# Patient Record
Sex: FEMALE | Race: WHITE | NOT HISPANIC OR LATINO | Employment: OTHER | URBAN - METROPOLITAN AREA
[De-identification: names, ages, dates, MRNs, and addresses within clinical notes are randomized per-mention and may not be internally consistent; named-entity substitution may affect disease eponyms.]

---

## 2021-01-01 ENCOUNTER — HOSPITAL ENCOUNTER (INPATIENT)
Facility: CLINIC | Age: 81
LOS: 4 days | Discharge: NURSING, REHAB OR LONG-TERM CARE | DRG: 309 | End: 2021-09-07
Attending: EMERGENCY MEDICINE | Admitting: HOSPITALIST
Payer: MEDICARE

## 2021-01-01 ENCOUNTER — APPOINTMENT (OUTPATIENT)
Dept: CT IMAGING | Facility: CLINIC | Age: 81
DRG: 309 | End: 2021-01-01
Attending: PHYSICIAN ASSISTANT
Payer: MEDICARE

## 2021-01-01 ENCOUNTER — APPOINTMENT (OUTPATIENT)
Dept: CARDIOLOGY | Facility: CLINIC | Age: 81
DRG: 309 | End: 2021-01-01
Attending: PHYSICIAN ASSISTANT
Payer: MEDICARE

## 2021-01-01 ENCOUNTER — APPOINTMENT (OUTPATIENT)
Dept: CARDIOLOGY | Facility: CLINIC | Age: 81
DRG: 309 | End: 2021-01-01
Attending: INTERNAL MEDICINE
Payer: MEDICARE

## 2021-01-01 ENCOUNTER — APPOINTMENT (OUTPATIENT)
Dept: ULTRASOUND IMAGING | Facility: CLINIC | Age: 81
DRG: 309 | End: 2021-01-01
Attending: EMERGENCY MEDICINE
Payer: MEDICARE

## 2021-01-01 ENCOUNTER — APPOINTMENT (OUTPATIENT)
Dept: PHYSICAL THERAPY | Facility: CLINIC | Age: 81
DRG: 309 | End: 2021-01-01
Payer: MEDICARE

## 2021-01-01 ENCOUNTER — APPOINTMENT (OUTPATIENT)
Dept: GENERAL RADIOLOGY | Facility: CLINIC | Age: 81
DRG: 309 | End: 2021-01-01
Attending: EMERGENCY MEDICINE
Payer: MEDICARE

## 2021-01-01 ENCOUNTER — APPOINTMENT (OUTPATIENT)
Dept: PHYSICAL THERAPY | Facility: CLINIC | Age: 81
DRG: 309 | End: 2021-01-01
Attending: HOSPITALIST
Payer: MEDICARE

## 2021-01-01 VITALS
HEIGHT: 66 IN | TEMPERATURE: 97.6 F | DIASTOLIC BLOOD PRESSURE: 69 MMHG | WEIGHT: 189.6 LBS | HEART RATE: 75 BPM | BODY MASS INDEX: 30.47 KG/M2 | OXYGEN SATURATION: 91 % | SYSTOLIC BLOOD PRESSURE: 149 MMHG | RESPIRATION RATE: 16 BRPM

## 2021-01-01 DIAGNOSIS — M71.21 BAKER'S CYST OF KNEE, RIGHT: ICD-10-CM

## 2021-01-01 DIAGNOSIS — M17.11 PRIMARY OSTEOARTHRITIS OF RIGHT KNEE: ICD-10-CM

## 2021-01-01 DIAGNOSIS — E11.9 TYPE 2 DIABETES MELLITUS WITHOUT COMPLICATION, WITH LONG-TERM CURRENT USE OF INSULIN (H): ICD-10-CM

## 2021-01-01 DIAGNOSIS — M25.561 ACUTE PAIN OF RIGHT KNEE: ICD-10-CM

## 2021-01-01 DIAGNOSIS — B37.2 YEAST INFECTION OF THE SKIN: ICD-10-CM

## 2021-01-01 DIAGNOSIS — Z79.4 TYPE 2 DIABETES MELLITUS WITHOUT COMPLICATION, WITH LONG-TERM CURRENT USE OF INSULIN (H): ICD-10-CM

## 2021-01-01 DIAGNOSIS — I48.0 PAROXYSMAL ATRIAL FIBRILLATION (H): Primary | ICD-10-CM

## 2021-01-01 LAB
ALBUMIN SERPL-MCNC: 2.9 G/DL (ref 3.4–5)
ALBUMIN UR-MCNC: NEGATIVE MG/DL
ALP SERPL-CCNC: 100 U/L (ref 40–150)
ALT SERPL W P-5'-P-CCNC: 24 U/L (ref 0–50)
ANION GAP SERPL CALCULATED.3IONS-SCNC: 1 MMOL/L (ref 3–14)
ANION GAP SERPL CALCULATED.3IONS-SCNC: 1 MMOL/L (ref 3–14)
ANION GAP SERPL CALCULATED.3IONS-SCNC: 4 MMOL/L (ref 3–14)
ANION GAP SERPL CALCULATED.3IONS-SCNC: 8 MMOL/L (ref 3–14)
APPEARANCE UR: CLEAR
AST SERPL W P-5'-P-CCNC: 16 U/L (ref 0–45)
ATRIAL RATE - MUSE: 122 BPM
ATRIAL RATE - MUSE: 74 BPM
BACTERIA #/AREA URNS HPF: ABNORMAL /HPF
BACTERIA UR CULT: ABNORMAL
BASOPHILS # BLD AUTO: 0.1 10E3/UL (ref 0–0.2)
BASOPHILS NFR BLD AUTO: 1 %
BILIRUB DIRECT SERPL-MCNC: <0.1 MG/DL (ref 0–0.2)
BILIRUB SERPL-MCNC: 0.2 MG/DL (ref 0.2–1.3)
BILIRUB UR QL STRIP: NEGATIVE
BUN SERPL-MCNC: 26 MG/DL (ref 7–30)
BUN SERPL-MCNC: 32 MG/DL (ref 7–30)
BUN SERPL-MCNC: 40 MG/DL (ref 7–30)
BUN SERPL-MCNC: 42 MG/DL (ref 7–30)
CALCIUM SERPL-MCNC: 9.5 MG/DL (ref 8.5–10.1)
CALCIUM SERPL-MCNC: 9.6 MG/DL (ref 8.5–10.1)
CALCIUM SERPL-MCNC: 9.8 MG/DL (ref 8.5–10.1)
CALCIUM SERPL-MCNC: 9.9 MG/DL (ref 8.5–10.1)
CHLORIDE BLD-SCNC: 106 MMOL/L (ref 94–109)
CHLORIDE BLD-SCNC: 107 MMOL/L (ref 94–109)
CHLORIDE BLD-SCNC: 107 MMOL/L (ref 94–109)
CHLORIDE BLD-SCNC: 109 MMOL/L (ref 94–109)
CO2 SERPL-SCNC: 25 MMOL/L (ref 20–32)
CO2 SERPL-SCNC: 28 MMOL/L (ref 20–32)
CO2 SERPL-SCNC: 29 MMOL/L (ref 20–32)
CO2 SERPL-SCNC: 31 MMOL/L (ref 20–32)
COLOR UR AUTO: ABNORMAL
CREAT SERPL-MCNC: 0.57 MG/DL (ref 0.52–1.04)
CREAT SERPL-MCNC: 0.62 MG/DL (ref 0.52–1.04)
CREAT SERPL-MCNC: 1.02 MG/DL (ref 0.52–1.04)
CREAT SERPL-MCNC: 1.17 MG/DL (ref 0.52–1.04)
DIASTOLIC BLOOD PRESSURE - MUSE: NORMAL MMHG
DIASTOLIC BLOOD PRESSURE - MUSE: NORMAL MMHG
EOSINOPHIL # BLD AUTO: 0.1 10E3/UL (ref 0–0.7)
EOSINOPHIL NFR BLD AUTO: 1 %
ERYTHROCYTE [DISTWIDTH] IN BLOOD BY AUTOMATED COUNT: 13 % (ref 10–15)
ERYTHROCYTE [DISTWIDTH] IN BLOOD BY AUTOMATED COUNT: 13 % (ref 10–15)
GFR SERPL CREATININE-BSD FRML MDRD: 45 ML/MIN/1.73M2
GFR SERPL CREATININE-BSD FRML MDRD: 53 ML/MIN/1.73M2
GFR SERPL CREATININE-BSD FRML MDRD: 87 ML/MIN/1.73M2
GFR SERPL CREATININE-BSD FRML MDRD: 89 ML/MIN/1.73M2
GLUCOSE BLD-MCNC: 200 MG/DL (ref 70–99)
GLUCOSE BLD-MCNC: 209 MG/DL (ref 70–99)
GLUCOSE BLD-MCNC: 272 MG/DL (ref 70–99)
GLUCOSE BLD-MCNC: 78 MG/DL (ref 70–99)
GLUCOSE BLDC GLUCOMTR-MCNC: 100 MG/DL (ref 70–99)
GLUCOSE BLDC GLUCOMTR-MCNC: 105 MG/DL (ref 70–99)
GLUCOSE BLDC GLUCOMTR-MCNC: 108 MG/DL (ref 70–99)
GLUCOSE BLDC GLUCOMTR-MCNC: 115 MG/DL (ref 70–99)
GLUCOSE BLDC GLUCOMTR-MCNC: 116 MG/DL (ref 70–99)
GLUCOSE BLDC GLUCOMTR-MCNC: 119 MG/DL (ref 70–99)
GLUCOSE BLDC GLUCOMTR-MCNC: 127 MG/DL (ref 70–99)
GLUCOSE BLDC GLUCOMTR-MCNC: 135 MG/DL (ref 70–99)
GLUCOSE BLDC GLUCOMTR-MCNC: 142 MG/DL (ref 70–99)
GLUCOSE BLDC GLUCOMTR-MCNC: 147 MG/DL (ref 70–99)
GLUCOSE BLDC GLUCOMTR-MCNC: 157 MG/DL (ref 70–99)
GLUCOSE BLDC GLUCOMTR-MCNC: 173 MG/DL (ref 70–99)
GLUCOSE BLDC GLUCOMTR-MCNC: 183 MG/DL (ref 70–99)
GLUCOSE BLDC GLUCOMTR-MCNC: 194 MG/DL (ref 70–99)
GLUCOSE BLDC GLUCOMTR-MCNC: 199 MG/DL (ref 70–99)
GLUCOSE BLDC GLUCOMTR-MCNC: 210 MG/DL (ref 70–99)
GLUCOSE BLDC GLUCOMTR-MCNC: 223 MG/DL (ref 70–99)
GLUCOSE BLDC GLUCOMTR-MCNC: 231 MG/DL (ref 70–99)
GLUCOSE BLDC GLUCOMTR-MCNC: 231 MG/DL (ref 70–99)
GLUCOSE BLDC GLUCOMTR-MCNC: 234 MG/DL (ref 70–99)
GLUCOSE BLDC GLUCOMTR-MCNC: 243 MG/DL (ref 70–99)
GLUCOSE BLDC GLUCOMTR-MCNC: 270 MG/DL (ref 70–99)
GLUCOSE BLDC GLUCOMTR-MCNC: 291 MG/DL (ref 70–99)
GLUCOSE BLDC GLUCOMTR-MCNC: 65 MG/DL (ref 70–99)
GLUCOSE BLDC GLUCOMTR-MCNC: 77 MG/DL (ref 70–99)
GLUCOSE BLDC GLUCOMTR-MCNC: 90 MG/DL (ref 70–99)
GLUCOSE BLDC GLUCOMTR-MCNC: 92 MG/DL (ref 70–99)
GLUCOSE UR STRIP-MCNC: >=1000 MG/DL
HBA1C MFR BLD: 7 % (ref 0–5.6)
HCT VFR BLD AUTO: 39.8 % (ref 35–47)
HCT VFR BLD AUTO: 41.3 % (ref 35–47)
HGB BLD-MCNC: 12.7 G/DL (ref 11.7–15.7)
HGB BLD-MCNC: 13.6 G/DL (ref 11.7–15.7)
HGB UR QL STRIP: NEGATIVE
IMM GRANULOCYTES # BLD: 0.1 10E3/UL
IMM GRANULOCYTES NFR BLD: 1 %
INR PPP: 0.97 (ref 0.85–1.15)
INR PPP: 1 (ref 0.85–1.15)
INR PPP: 1.18 (ref 0.85–1.15)
INR PPP: 1.97 (ref 0.85–1.15)
INR PPP: 2.92 (ref 0.85–1.15)
INTERPRETATION ECG - MUSE: NORMAL
INTERPRETATION ECG - MUSE: NORMAL
KETONES UR STRIP-MCNC: NEGATIVE MG/DL
LEUKOCYTE ESTERASE UR QL STRIP: ABNORMAL
LVEF ECHO: NORMAL
LVEF ECHO: NORMAL
LYMPHOCYTES # BLD AUTO: 2.4 10E3/UL (ref 0.8–5.3)
LYMPHOCYTES NFR BLD AUTO: 16 %
MAGNESIUM SERPL-MCNC: 2.1 MG/DL (ref 1.6–2.3)
MCH RBC QN AUTO: 28 PG (ref 26.5–33)
MCH RBC QN AUTO: 28.8 PG (ref 26.5–33)
MCHC RBC AUTO-ENTMCNC: 31.9 G/DL (ref 31.5–36.5)
MCHC RBC AUTO-ENTMCNC: 32.9 G/DL (ref 31.5–36.5)
MCV RBC AUTO: 88 FL (ref 78–100)
MCV RBC AUTO: 88 FL (ref 78–100)
MONOCYTES # BLD AUTO: 1.2 10E3/UL (ref 0–1.3)
MONOCYTES NFR BLD AUTO: 8 %
MUCOUS THREADS #/AREA URNS LPF: PRESENT /LPF
NEUTROPHILS # BLD AUTO: 11 10E3/UL (ref 1.6–8.3)
NEUTROPHILS NFR BLD AUTO: 73 %
NITRATE UR QL: NEGATIVE
NRBC # BLD AUTO: 0 10E3/UL
NRBC BLD AUTO-RTO: 0 /100
P AXIS - MUSE: 50 DEGREES
P AXIS - MUSE: 74 DEGREES
PH UR STRIP: 5 [PH] (ref 5–7)
PLATELET # BLD AUTO: 309 10E3/UL (ref 150–450)
PLATELET # BLD AUTO: 335 10E3/UL (ref 150–450)
POTASSIUM BLD-SCNC: 3.3 MMOL/L (ref 3.4–5.3)
POTASSIUM BLD-SCNC: 3.7 MMOL/L (ref 3.4–5.3)
POTASSIUM BLD-SCNC: 3.8 MMOL/L (ref 3.4–5.3)
POTASSIUM BLD-SCNC: 3.9 MMOL/L (ref 3.4–5.3)
POTASSIUM BLD-SCNC: 3.9 MMOL/L (ref 3.4–5.3)
POTASSIUM BLD-SCNC: 4.1 MMOL/L (ref 3.4–5.3)
POTASSIUM BLD-SCNC: 4.3 MMOL/L (ref 3.4–5.3)
PR INTERVAL - MUSE: 196 MS
PR INTERVAL - MUSE: 290 MS
PROT SERPL-MCNC: 6.6 G/DL (ref 6.8–8.8)
QRS DURATION - MUSE: 76 MS
QRS DURATION - MUSE: 80 MS
QT - MUSE: 322 MS
QT - MUSE: 384 MS
QTC - MUSE: 426 MS
QTC - MUSE: 458 MS
R AXIS - MUSE: -10 DEGREES
R AXIS - MUSE: 9 DEGREES
RBC # BLD AUTO: 4.54 10E6/UL (ref 3.8–5.2)
RBC # BLD AUTO: 4.72 10E6/UL (ref 3.8–5.2)
RBC URINE: 8 /HPF
SARS-COV-2 RNA RESP QL NAA+PROBE: NEGATIVE
SODIUM SERPL-SCNC: 138 MMOL/L (ref 133–144)
SODIUM SERPL-SCNC: 139 MMOL/L (ref 133–144)
SODIUM SERPL-SCNC: 139 MMOL/L (ref 133–144)
SODIUM SERPL-SCNC: 140 MMOL/L (ref 133–144)
SP GR UR STRIP: 1.02 (ref 1–1.03)
SQUAMOUS EPITHELIAL: 1 /HPF
SYSTOLIC BLOOD PRESSURE - MUSE: NORMAL MMHG
SYSTOLIC BLOOD PRESSURE - MUSE: NORMAL MMHG
T AXIS - MUSE: -62 DEGREES
T AXIS - MUSE: 63 DEGREES
TROPONIN I SERPL-MCNC: 0.02 UG/L (ref 0–0.04)
TROPONIN I SERPL-MCNC: 0.03 UG/L (ref 0–0.04)
TROPONIN I SERPL-MCNC: <0.015 UG/L (ref 0–0.04)
TSH SERPL DL<=0.005 MIU/L-ACNC: 1.2 MU/L (ref 0.4–4)
UROBILINOGEN UR STRIP-MCNC: NORMAL MG/DL
VENTRICULAR RATE- MUSE: 122 BPM
VENTRICULAR RATE- MUSE: 74 BPM
WBC # BLD AUTO: 10.1 10E3/UL (ref 4–11)
WBC # BLD AUTO: 14.9 10E3/UL (ref 4–11)
WBC URINE: 66 /HPF

## 2021-01-01 PROCEDURE — 85610 PROTHROMBIN TIME: CPT | Performed by: INTERNAL MEDICINE

## 2021-01-01 PROCEDURE — 250N000011 HC RX IP 250 OP 636: Performed by: HOSPITALIST

## 2021-01-01 PROCEDURE — 82248 BILIRUBIN DIRECT: CPT | Performed by: INTERNAL MEDICINE

## 2021-01-01 PROCEDURE — 97530 THERAPEUTIC ACTIVITIES: CPT | Mod: GP

## 2021-01-01 PROCEDURE — 93306 TTE W/DOPPLER COMPLETE: CPT | Mod: 26 | Performed by: INTERNAL MEDICINE

## 2021-01-01 PROCEDURE — 120N000004 HC R&B MS OVERFLOW

## 2021-01-01 PROCEDURE — C8929 TTE W OR WO FOL WCON,DOPPLER: HCPCS

## 2021-01-01 PROCEDURE — 36415 COLL VENOUS BLD VENIPUNCTURE: CPT | Performed by: INTERNAL MEDICINE

## 2021-01-01 PROCEDURE — 99222 1ST HOSP IP/OBS MODERATE 55: CPT | Performed by: INTERNAL MEDICINE

## 2021-01-01 PROCEDURE — 99285 EMERGENCY DEPT VISIT HI MDM: CPT | Mod: 25

## 2021-01-01 PROCEDURE — 93971 EXTREMITY STUDY: CPT | Mod: RT

## 2021-01-01 PROCEDURE — 250N000013 HC RX MED GY IP 250 OP 250 PS 637: Performed by: INTERNAL MEDICINE

## 2021-01-01 PROCEDURE — 96361 HYDRATE IV INFUSION ADD-ON: CPT

## 2021-01-01 PROCEDURE — 85610 PROTHROMBIN TIME: CPT | Performed by: HOSPITALIST

## 2021-01-01 PROCEDURE — C8924 2D TTE W OR W/O FOL W/CON,FU: HCPCS

## 2021-01-01 PROCEDURE — 36415 COLL VENOUS BLD VENIPUNCTURE: CPT | Performed by: HOSPITALIST

## 2021-01-01 PROCEDURE — 84443 ASSAY THYROID STIM HORMONE: CPT | Performed by: PHYSICIAN ASSISTANT

## 2021-01-01 PROCEDURE — G0378 HOSPITAL OBSERVATION PER HR: HCPCS

## 2021-01-01 PROCEDURE — 255N000002 HC RX 255 OP 636: Performed by: HOSPITALIST

## 2021-01-01 PROCEDURE — 250N000013 HC RX MED GY IP 250 OP 250 PS 637: Performed by: HOSPITALIST

## 2021-01-01 PROCEDURE — 97530 THERAPEUTIC ACTIVITIES: CPT | Mod: GP | Performed by: PHYSICAL THERAPIST

## 2021-01-01 PROCEDURE — 87635 SARS-COV-2 COVID-19 AMP PRB: CPT | Performed by: EMERGENCY MEDICINE

## 2021-01-01 PROCEDURE — 36415 COLL VENOUS BLD VENIPUNCTURE: CPT | Performed by: PHYSICIAN ASSISTANT

## 2021-01-01 PROCEDURE — 93325 DOPPLER ECHO COLOR FLOW MAPG: CPT | Mod: 26 | Performed by: INTERNAL MEDICINE

## 2021-01-01 PROCEDURE — 87086 URINE CULTURE/COLONY COUNT: CPT | Performed by: PHYSICIAN ASSISTANT

## 2021-01-01 PROCEDURE — 73502 X-RAY EXAM HIP UNI 2-3 VIEWS: CPT

## 2021-01-01 PROCEDURE — 93248 EXT ECG>7D<15D REV&INTERPJ: CPT | Performed by: INTERNAL MEDICINE

## 2021-01-01 PROCEDURE — 97110 THERAPEUTIC EXERCISES: CPT | Mod: GP | Performed by: PHYSICAL THERAPIST

## 2021-01-01 PROCEDURE — 250N000013 HC RX MED GY IP 250 OP 250 PS 637: Performed by: PHYSICIAN ASSISTANT

## 2021-01-01 PROCEDURE — 84484 ASSAY OF TROPONIN QUANT: CPT | Performed by: PHYSICIAN ASSISTANT

## 2021-01-01 PROCEDURE — 80048 BASIC METABOLIC PNL TOTAL CA: CPT | Performed by: EMERGENCY MEDICINE

## 2021-01-01 PROCEDURE — C9803 HOPD COVID-19 SPEC COLLECT: HCPCS

## 2021-01-01 PROCEDURE — 80048 BASIC METABOLIC PNL TOTAL CA: CPT | Performed by: PHYSICIAN ASSISTANT

## 2021-01-01 PROCEDURE — 99207 PR CDG-MDM COMPONENT: MEETS MODERATE - DOWN CODED: CPT | Performed by: HOSPITALIST

## 2021-01-01 PROCEDURE — 250N000013 HC RX MED GY IP 250 OP 250 PS 637: Performed by: EMERGENCY MEDICINE

## 2021-01-01 PROCEDURE — 93246 EXT ECG>7D<15D RECORDING: CPT

## 2021-01-01 PROCEDURE — 93308 TTE F-UP OR LMTD: CPT | Mod: 26 | Performed by: INTERNAL MEDICINE

## 2021-01-01 PROCEDURE — 83735 ASSAY OF MAGNESIUM: CPT | Performed by: PHYSICIAN ASSISTANT

## 2021-01-01 PROCEDURE — 84132 ASSAY OF SERUM POTASSIUM: CPT | Performed by: INTERNAL MEDICINE

## 2021-01-01 PROCEDURE — 85027 COMPLETE CBC AUTOMATED: CPT | Performed by: HOSPITALIST

## 2021-01-01 PROCEDURE — 258N000003 HC RX IP 258 OP 636: Performed by: HOSPITALIST

## 2021-01-01 PROCEDURE — 84484 ASSAY OF TROPONIN QUANT: CPT | Performed by: HOSPITALIST

## 2021-01-01 PROCEDURE — 80048 BASIC METABOLIC PNL TOTAL CA: CPT | Performed by: HOSPITALIST

## 2021-01-01 PROCEDURE — 99232 SBSQ HOSP IP/OBS MODERATE 35: CPT | Performed by: HOSPITALIST

## 2021-01-01 PROCEDURE — 96372 THER/PROPH/DIAG INJ SC/IM: CPT | Performed by: HOSPITALIST

## 2021-01-01 PROCEDURE — 36415 COLL VENOUS BLD VENIPUNCTURE: CPT | Performed by: EMERGENCY MEDICINE

## 2021-01-01 PROCEDURE — 99223 1ST HOSP IP/OBS HIGH 75: CPT | Mod: AI | Performed by: HOSPITALIST

## 2021-01-01 PROCEDURE — 73560 X-RAY EXAM OF KNEE 1 OR 2: CPT | Mod: RT

## 2021-01-01 PROCEDURE — 84132 ASSAY OF SERUM POTASSIUM: CPT | Performed by: HOSPITALIST

## 2021-01-01 PROCEDURE — 97161 PT EVAL LOW COMPLEX 20 MIN: CPT | Mod: GP | Performed by: PHYSICAL THERAPIST

## 2021-01-01 PROCEDURE — 99233 SBSQ HOSP IP/OBS HIGH 50: CPT | Performed by: PHYSICIAN ASSISTANT

## 2021-01-01 PROCEDURE — 93321 DOPPLER ECHO F-UP/LMTD STD: CPT | Mod: 26 | Performed by: INTERNAL MEDICINE

## 2021-01-01 PROCEDURE — 81001 URINALYSIS AUTO W/SCOPE: CPT | Performed by: PHYSICIAN ASSISTANT

## 2021-01-01 PROCEDURE — 999N000208 ECHOCARDIOGRAM COMPLETE

## 2021-01-01 PROCEDURE — 85025 COMPLETE CBC W/AUTO DIFF WBC: CPT | Performed by: EMERGENCY MEDICINE

## 2021-01-01 PROCEDURE — 99233 SBSQ HOSP IP/OBS HIGH 50: CPT | Performed by: HOSPITALIST

## 2021-01-01 PROCEDURE — 99239 HOSP IP/OBS DSCHRG MGMT >30: CPT | Performed by: PHYSICIAN ASSISTANT

## 2021-01-01 PROCEDURE — 250N000012 HC RX MED GY IP 250 OP 636 PS 637: Performed by: HOSPITALIST

## 2021-01-01 PROCEDURE — 83036 HEMOGLOBIN GLYCOSYLATED A1C: CPT | Performed by: HOSPITALIST

## 2021-01-01 PROCEDURE — 73700 CT LOWER EXTREMITY W/O DYE: CPT | Mod: RT,ME

## 2021-01-01 PROCEDURE — 96360 HYDRATION IV INFUSION INIT: CPT

## 2021-01-01 RX ORDER — METOPROLOL TARTRATE 50 MG
100 TABLET ORAL 2 TIMES DAILY
Status: DISCONTINUED | OUTPATIENT
Start: 2021-01-01 | End: 2021-01-01

## 2021-01-01 RX ORDER — AMIODARONE HYDROCHLORIDE 200 MG/1
200 TABLET ORAL 3 TIMES DAILY
Qty: 30 TABLET | Refills: 0 | DISCHARGE
Start: 2021-01-01 | End: 2022-01-01

## 2021-01-01 RX ORDER — WARFARIN SODIUM 1 MG/1
2.5 TABLET ORAL DAILY
Qty: 30 TABLET | Refills: 0 | DISCHARGE
Start: 2021-01-01

## 2021-01-01 RX ORDER — AMLODIPINE BESYLATE 10 MG/1
10 TABLET ORAL DAILY
COMMUNITY

## 2021-01-01 RX ORDER — LISINOPRIL AND HYDROCHLOROTHIAZIDE 12.5; 2 MG/1; MG/1
2 TABLET ORAL DAILY
COMMUNITY

## 2021-01-01 RX ORDER — NITROGLYCERIN 0.4 MG/1
0.4 TABLET SUBLINGUAL EVERY 5 MIN PRN
Status: DISCONTINUED | OUTPATIENT
Start: 2021-01-01 | End: 2021-01-01 | Stop reason: HOSPADM

## 2021-01-01 RX ORDER — ONDANSETRON 4 MG/1
4 TABLET, ORALLY DISINTEGRATING ORAL EVERY 6 HOURS PRN
Status: DISCONTINUED | OUTPATIENT
Start: 2021-01-01 | End: 2021-01-01 | Stop reason: HOSPADM

## 2021-01-01 RX ORDER — AMIODARONE HYDROCHLORIDE 200 MG/1
200 TABLET ORAL DAILY
Qty: 30 TABLET | Refills: 0 | DISCHARGE
Start: 2021-01-01

## 2021-01-01 RX ORDER — CALCIUM CARBONATE 500 MG/1
500 TABLET, CHEWABLE ORAL DAILY PRN
Status: DISCONTINUED | OUTPATIENT
Start: 2021-01-01 | End: 2021-01-01 | Stop reason: HOSPADM

## 2021-01-01 RX ORDER — METOPROLOL TARTRATE 100 MG
100 TABLET ORAL 2 TIMES DAILY
Status: ON HOLD | COMMUNITY
End: 2021-01-01

## 2021-01-01 RX ORDER — WARFARIN SODIUM 2.5 MG/1
2.5 TABLET ORAL
Status: COMPLETED | OUTPATIENT
Start: 2021-01-01 | End: 2021-01-01

## 2021-01-01 RX ORDER — NALOXONE HYDROCHLORIDE 0.4 MG/ML
0.4 INJECTION, SOLUTION INTRAMUSCULAR; INTRAVENOUS; SUBCUTANEOUS
Status: DISCONTINUED | OUTPATIENT
Start: 2021-01-01 | End: 2021-01-01 | Stop reason: HOSPADM

## 2021-01-01 RX ORDER — AMLODIPINE BESYLATE 10 MG/1
10 TABLET ORAL DAILY
Status: DISCONTINUED | OUTPATIENT
Start: 2021-01-01 | End: 2021-01-01 | Stop reason: HOSPADM

## 2021-01-01 RX ORDER — GLIPIZIDE 10 MG/1
10 TABLET ORAL
Status: DISCONTINUED | OUTPATIENT
Start: 2021-01-01 | End: 2021-01-01 | Stop reason: HOSPADM

## 2021-01-01 RX ORDER — POTASSIUM CHLORIDE 1500 MG/1
40 TABLET, EXTENDED RELEASE ORAL ONCE
Status: COMPLETED | OUTPATIENT
Start: 2021-01-01 | End: 2021-01-01

## 2021-01-01 RX ORDER — WARFARIN SODIUM 5 MG/1
5 TABLET ORAL
Status: COMPLETED | OUTPATIENT
Start: 2021-01-01 | End: 2021-01-01

## 2021-01-01 RX ORDER — ONDANSETRON 2 MG/ML
4 INJECTION INTRAMUSCULAR; INTRAVENOUS EVERY 6 HOURS PRN
Status: DISCONTINUED | OUTPATIENT
Start: 2021-01-01 | End: 2021-01-01 | Stop reason: HOSPADM

## 2021-01-01 RX ORDER — ACETAMINOPHEN 325 MG/1
975 TABLET ORAL 3 TIMES DAILY
Status: DISCONTINUED | OUTPATIENT
Start: 2021-01-01 | End: 2021-01-01 | Stop reason: HOSPADM

## 2021-01-01 RX ORDER — LISINOPRIL AND HYDROCHLOROTHIAZIDE 12.5; 2 MG/1; MG/1
2 TABLET ORAL DAILY
Status: DISCONTINUED | OUTPATIENT
Start: 2021-01-01 | End: 2021-01-01 | Stop reason: HOSPADM

## 2021-01-01 RX ORDER — HYDRALAZINE HYDROCHLORIDE 20 MG/ML
10 INJECTION INTRAMUSCULAR; INTRAVENOUS EVERY 4 HOURS PRN
Status: DISCONTINUED | OUTPATIENT
Start: 2021-01-01 | End: 2021-01-01 | Stop reason: HOSPADM

## 2021-01-01 RX ORDER — AMIODARONE HYDROCHLORIDE 200 MG/1
200 TABLET ORAL DAILY
Status: DISCONTINUED | OUTPATIENT
Start: 2021-01-01 | End: 2021-01-01 | Stop reason: HOSPADM

## 2021-01-01 RX ORDER — NALOXONE HYDROCHLORIDE 0.4 MG/ML
0.2 INJECTION, SOLUTION INTRAMUSCULAR; INTRAVENOUS; SUBCUTANEOUS
Status: DISCONTINUED | OUTPATIENT
Start: 2021-01-01 | End: 2021-01-01 | Stop reason: HOSPADM

## 2021-01-01 RX ORDER — METOPROLOL SUCCINATE 50 MG/1
50 TABLET, EXTENDED RELEASE ORAL DAILY
Qty: 30 TABLET | Refills: 0 | DISCHARGE
Start: 2021-01-01

## 2021-01-01 RX ORDER — ALBUTEROL SULFATE 90 UG/1
2 AEROSOL, METERED RESPIRATORY (INHALATION) 4 TIMES DAILY PRN
Status: DISCONTINUED | OUTPATIENT
Start: 2021-01-01 | End: 2021-01-01 | Stop reason: HOSPADM

## 2021-01-01 RX ORDER — AMIODARONE HYDROCHLORIDE 200 MG/1
200 TABLET ORAL 3 TIMES DAILY
Status: DISCONTINUED | OUTPATIENT
Start: 2021-01-01 | End: 2021-01-01 | Stop reason: HOSPADM

## 2021-01-01 RX ORDER — CYCLOBENZAPRINE HCL 10 MG
10 TABLET ORAL ONCE
Status: COMPLETED | OUTPATIENT
Start: 2021-01-01 | End: 2021-01-01

## 2021-01-01 RX ORDER — METOPROLOL SUCCINATE 50 MG/1
50 TABLET, EXTENDED RELEASE ORAL DAILY
Status: DISCONTINUED | OUTPATIENT
Start: 2021-01-01 | End: 2021-01-01 | Stop reason: HOSPADM

## 2021-01-01 RX ORDER — ACETAMINOPHEN 650 MG/1
650 SUPPOSITORY RECTAL EVERY 6 HOURS PRN
Status: DISCONTINUED | OUTPATIENT
Start: 2021-01-01 | End: 2021-01-01

## 2021-01-01 RX ORDER — METOPROLOL TARTRATE 1 MG/ML
5 INJECTION, SOLUTION INTRAVENOUS
Status: ACTIVE | OUTPATIENT
Start: 2021-01-01 | End: 2021-01-01

## 2021-01-01 RX ORDER — CYCLOBENZAPRINE HCL 5 MG
10 TABLET ORAL EVERY 8 HOURS PRN
Status: DISCONTINUED | OUTPATIENT
Start: 2021-01-01 | End: 2021-01-01

## 2021-01-01 RX ORDER — NICOTINE POLACRILEX 4 MG
15-30 LOZENGE BUCCAL
Status: DISCONTINUED | OUTPATIENT
Start: 2021-01-01 | End: 2021-01-01 | Stop reason: HOSPADM

## 2021-01-01 RX ORDER — ACETAMINOPHEN 325 MG/1
650 TABLET ORAL EVERY 6 HOURS PRN
Status: DISCONTINUED | OUTPATIENT
Start: 2021-01-01 | End: 2021-01-01

## 2021-01-01 RX ORDER — DEXTROSE MONOHYDRATE 25 G/50ML
25-50 INJECTION, SOLUTION INTRAVENOUS
Status: DISCONTINUED | OUTPATIENT
Start: 2021-01-01 | End: 2021-01-01 | Stop reason: HOSPADM

## 2021-01-01 RX ORDER — TRAMADOL HYDROCHLORIDE 50 MG/1
25 TABLET ORAL EVERY 6 HOURS PRN
Qty: 15 TABLET | Refills: 0 | Status: SHIPPED | OUTPATIENT
Start: 2021-01-01

## 2021-01-01 RX ORDER — GLIPIZIDE 10 MG/1
10 TABLET ORAL
COMMUNITY

## 2021-01-01 RX ORDER — CYCLOBENZAPRINE HCL 5 MG
10 TABLET ORAL 3 TIMES DAILY
Status: COMPLETED | OUTPATIENT
Start: 2021-01-01 | End: 2021-01-01

## 2021-01-01 RX ORDER — WARFARIN SODIUM 5 MG/1
5 TABLET ORAL ONCE
Status: COMPLETED | OUTPATIENT
Start: 2021-01-01 | End: 2021-01-01

## 2021-01-01 RX ORDER — ACETAMINOPHEN 325 MG/1
975 TABLET ORAL 3 TIMES DAILY
Qty: 45 TABLET | Refills: 0 | DISCHARGE
Start: 2021-01-01 | End: 2021-01-01

## 2021-01-01 RX ORDER — ATORVASTATIN CALCIUM 40 MG/1
40 TABLET, FILM COATED ORAL AT BEDTIME
Status: DISCONTINUED | OUTPATIENT
Start: 2021-01-01 | End: 2021-01-01 | Stop reason: HOSPADM

## 2021-01-01 RX ORDER — IBUPROFEN 600 MG/1
600 TABLET, FILM COATED ORAL ONCE
Status: COMPLETED | OUTPATIENT
Start: 2021-01-01 | End: 2021-01-01

## 2021-01-01 RX ORDER — ACETAMINOPHEN 500 MG
1000 TABLET ORAL ONCE
Status: COMPLETED | OUTPATIENT
Start: 2021-01-01 | End: 2021-01-01

## 2021-01-01 RX ORDER — ATORVASTATIN CALCIUM 40 MG/1
40 TABLET, FILM COATED ORAL AT BEDTIME
COMMUNITY

## 2021-01-01 RX ORDER — SODIUM CHLORIDE 9 MG/ML
INJECTION, SOLUTION INTRAVENOUS CONTINUOUS
Status: DISCONTINUED | OUTPATIENT
Start: 2021-01-01 | End: 2021-01-01

## 2021-01-01 RX ADMIN — CYCLOBENZAPRINE HYDROCHLORIDE 10 MG: 5 TABLET, FILM COATED ORAL at 10:21

## 2021-01-01 RX ADMIN — LISINOPRIL AND HYDROCHLOROTHIAZIDE 2 TABLET: 12.5; 2 TABLET ORAL at 10:25

## 2021-01-01 RX ADMIN — LISINOPRIL AND HYDROCHLOROTHIAZIDE 2 TABLET: 12.5; 2 TABLET ORAL at 07:27

## 2021-01-01 RX ADMIN — ATORVASTATIN CALCIUM 40 MG: 40 TABLET, FILM COATED ORAL at 22:07

## 2021-01-01 RX ADMIN — METFORMIN HYDROCHLORIDE 500 MG: 500 TABLET, FILM COATED ORAL at 13:00

## 2021-01-01 RX ADMIN — POTASSIUM CHLORIDE 40 MEQ: 1500 TABLET, EXTENDED RELEASE ORAL at 06:52

## 2021-01-01 RX ADMIN — METFORMIN HYDROCHLORIDE 500 MG: 500 TABLET, FILM COATED ORAL at 08:37

## 2021-01-01 RX ADMIN — CYCLOBENZAPRINE HYDROCHLORIDE 10 MG: 5 TABLET, FILM COATED ORAL at 20:29

## 2021-01-01 RX ADMIN — LISINOPRIL AND HYDROCHLOROTHIAZIDE 2 TABLET: 12.5; 2 TABLET ORAL at 08:37

## 2021-01-01 RX ADMIN — METOPROLOL TARTRATE 100 MG: 50 TABLET, FILM COATED ORAL at 10:24

## 2021-01-01 RX ADMIN — INSULIN ASPART 2 UNITS: 100 INJECTION, SOLUTION INTRAVENOUS; SUBCUTANEOUS at 01:12

## 2021-01-01 RX ADMIN — AMIODARONE HYDROCHLORIDE 200 MG: 200 TABLET ORAL at 20:29

## 2021-01-01 RX ADMIN — INSULIN GLARGINE 30 UNITS: 100 INJECTION, SOLUTION SUBCUTANEOUS at 01:12

## 2021-01-01 RX ADMIN — METOPROLOL SUCCINATE 50 MG: 50 TABLET, EXTENDED RELEASE ORAL at 08:28

## 2021-01-01 RX ADMIN — AMLODIPINE BESYLATE 10 MG: 10 TABLET ORAL at 07:27

## 2021-01-01 RX ADMIN — DICLOFENAC SODIUM 2 G: 10 GEL TOPICAL at 08:43

## 2021-01-01 RX ADMIN — CALCIUM CARBONATE (ANTACID) CHEW TAB 500 MG 500 MG: 500 CHEW TAB at 13:04

## 2021-01-01 RX ADMIN — METFORMIN HYDROCHLORIDE 500 MG: 500 TABLET, FILM COATED ORAL at 08:35

## 2021-01-01 RX ADMIN — DICLOFENAC SODIUM 2 G: 10 GEL TOPICAL at 17:16

## 2021-01-01 RX ADMIN — GLIPIZIDE 10 MG: 10 TABLET ORAL at 09:06

## 2021-01-01 RX ADMIN — AMLODIPINE BESYLATE 10 MG: 10 TABLET ORAL at 08:58

## 2021-01-01 RX ADMIN — TRAMADOL HYDROCHLORIDE 25 MG: 50 TABLET ORAL at 06:13

## 2021-01-01 RX ADMIN — AMIODARONE HYDROCHLORIDE 200 MG: 200 TABLET ORAL at 07:27

## 2021-01-01 RX ADMIN — WARFARIN SODIUM 7.5 MG: 5 TABLET ORAL at 17:02

## 2021-01-01 RX ADMIN — ACETAMINOPHEN 975 MG: 325 TABLET, FILM COATED ORAL at 14:30

## 2021-01-01 RX ADMIN — MICONAZOLE NITRATE: 20 POWDER TOPICAL at 20:11

## 2021-01-01 RX ADMIN — HUMAN ALBUMIN MICROSPHERES AND PERFLUTREN 2 ML: 10; .22 INJECTION, SOLUTION INTRAVENOUS at 14:47

## 2021-01-01 RX ADMIN — ACETAMINOPHEN 975 MG: 325 TABLET, FILM COATED ORAL at 20:22

## 2021-01-01 RX ADMIN — WARFARIN SODIUM 2.5 MG: 2.5 TABLET ORAL at 17:13

## 2021-01-01 RX ADMIN — ACETAMINOPHEN 975 MG: 325 TABLET, FILM COATED ORAL at 15:38

## 2021-01-01 RX ADMIN — AMIODARONE HYDROCHLORIDE 200 MG: 200 TABLET ORAL at 08:37

## 2021-01-01 RX ADMIN — CYCLOBENZAPRINE HYDROCHLORIDE 10 MG: 5 TABLET, FILM COATED ORAL at 08:37

## 2021-01-01 RX ADMIN — METOPROLOL SUCCINATE 50 MG: 50 TABLET, EXTENDED RELEASE ORAL at 08:58

## 2021-01-01 RX ADMIN — INSULIN DEGLUDEC INJECTION 50 UNITS: 100 INJECTION, SOLUTION SUBCUTANEOUS at 22:10

## 2021-01-01 RX ADMIN — DICLOFENAC SODIUM 2 G: 10 GEL TOPICAL at 16:02

## 2021-01-01 RX ADMIN — METOPROLOL SUCCINATE 50 MG: 50 TABLET, EXTENDED RELEASE ORAL at 19:11

## 2021-01-01 RX ADMIN — IBUPROFEN 600 MG: 600 TABLET, FILM COATED ORAL at 18:42

## 2021-01-01 RX ADMIN — MICONAZOLE NITRATE: 20 POWDER TOPICAL at 09:01

## 2021-01-01 RX ADMIN — GLIPIZIDE 10 MG: 10 TABLET ORAL at 16:56

## 2021-01-01 RX ADMIN — ACETAMINOPHEN 975 MG: 325 TABLET, FILM COATED ORAL at 19:53

## 2021-01-01 RX ADMIN — TRAMADOL HYDROCHLORIDE 25 MG: 50 TABLET ORAL at 10:04

## 2021-01-01 RX ADMIN — GLIPIZIDE 10 MG: 10 TABLET ORAL at 17:13

## 2021-01-01 RX ADMIN — MICONAZOLE NITRATE: 20 POWDER TOPICAL at 08:43

## 2021-01-01 RX ADMIN — CYCLOBENZAPRINE HYDROCHLORIDE 10 MG: 5 TABLET, FILM COATED ORAL at 14:30

## 2021-01-01 RX ADMIN — ATORVASTATIN CALCIUM 40 MG: 40 TABLET, FILM COATED ORAL at 22:10

## 2021-01-01 RX ADMIN — DICLOFENAC SODIUM 2 G: 10 GEL TOPICAL at 09:01

## 2021-01-01 RX ADMIN — CYCLOBENZAPRINE HYDROCHLORIDE 10 MG: 10 TABLET, FILM COATED ORAL at 20:09

## 2021-01-01 RX ADMIN — DICLOFENAC SODIUM 2 G: 10 GEL TOPICAL at 22:03

## 2021-01-01 RX ADMIN — GLIPIZIDE 10 MG: 10 TABLET ORAL at 17:46

## 2021-01-01 RX ADMIN — GLIPIZIDE 10 MG: 10 TABLET ORAL at 13:00

## 2021-01-01 RX ADMIN — DICLOFENAC SODIUM 2 G: 10 GEL TOPICAL at 12:42

## 2021-01-01 RX ADMIN — MICONAZOLE NITRATE: 20 POWDER TOPICAL at 20:21

## 2021-01-01 RX ADMIN — ACETAMINOPHEN 975 MG: 325 TABLET, FILM COATED ORAL at 20:29

## 2021-01-01 RX ADMIN — AMLODIPINE BESYLATE 10 MG: 10 TABLET ORAL at 08:37

## 2021-01-01 RX ADMIN — WARFARIN SODIUM 5 MG: 5 TABLET ORAL at 17:46

## 2021-01-01 RX ADMIN — GLIPIZIDE 10 MG: 10 TABLET ORAL at 08:37

## 2021-01-01 RX ADMIN — DICLOFENAC SODIUM 2 G: 10 GEL TOPICAL at 08:35

## 2021-01-01 RX ADMIN — DICLOFENAC SODIUM 2 G: 10 GEL TOPICAL at 07:30

## 2021-01-01 RX ADMIN — ACETAMINOPHEN 975 MG: 325 TABLET, FILM COATED ORAL at 08:58

## 2021-01-01 RX ADMIN — METOPROLOL SUCCINATE 50 MG: 50 TABLET, EXTENDED RELEASE ORAL at 08:37

## 2021-01-01 RX ADMIN — ACETAMINOPHEN 975 MG: 325 TABLET, FILM COATED ORAL at 07:27

## 2021-01-01 RX ADMIN — DICLOFENAC SODIUM 2 G: 10 GEL TOPICAL at 20:11

## 2021-01-01 RX ADMIN — DICLOFENAC SODIUM 2 G: 10 GEL TOPICAL at 20:21

## 2021-01-01 RX ADMIN — METFORMIN HYDROCHLORIDE 500 MG: 500 TABLET, FILM COATED ORAL at 17:46

## 2021-01-01 RX ADMIN — AMIODARONE HYDROCHLORIDE 200 MG: 200 TABLET ORAL at 14:30

## 2021-01-01 RX ADMIN — AMLODIPINE BESYLATE 10 MG: 10 TABLET ORAL at 10:24

## 2021-01-01 RX ADMIN — GLIPIZIDE 10 MG: 10 TABLET ORAL at 08:35

## 2021-01-01 RX ADMIN — LISINOPRIL AND HYDROCHLOROTHIAZIDE 2 TABLET: 12.5; 2 TABLET ORAL at 08:58

## 2021-01-01 RX ADMIN — SODIUM CHLORIDE: 9 INJECTION, SOLUTION INTRAVENOUS at 00:47

## 2021-01-01 RX ADMIN — DICLOFENAC SODIUM 2 G: 10 GEL TOPICAL at 20:31

## 2021-01-01 RX ADMIN — AMIODARONE HYDROCHLORIDE 200 MG: 200 TABLET ORAL at 15:12

## 2021-01-01 RX ADMIN — ACETAMINOPHEN 975 MG: 325 TABLET, FILM COATED ORAL at 15:12

## 2021-01-01 RX ADMIN — AMIODARONE HYDROCHLORIDE 200 MG: 200 TABLET ORAL at 08:28

## 2021-01-01 RX ADMIN — ACETAMINOPHEN 975 MG: 325 TABLET, FILM COATED ORAL at 13:53

## 2021-01-01 RX ADMIN — NITROGLYCERIN 0.4 MG: 0.4 TABLET SUBLINGUAL at 14:48

## 2021-01-01 RX ADMIN — AMLODIPINE BESYLATE 10 MG: 10 TABLET ORAL at 08:28

## 2021-01-01 RX ADMIN — AMIODARONE HYDROCHLORIDE 200 MG: 200 TABLET ORAL at 19:52

## 2021-01-01 RX ADMIN — TRAMADOL HYDROCHLORIDE 25 MG: 50 TABLET ORAL at 12:50

## 2021-01-01 RX ADMIN — METFORMIN HYDROCHLORIDE 500 MG: 500 TABLET, FILM COATED ORAL at 09:06

## 2021-01-01 RX ADMIN — METFORMIN HYDROCHLORIDE 500 MG: 500 TABLET, FILM COATED ORAL at 17:02

## 2021-01-01 RX ADMIN — CYCLOBENZAPRINE HYDROCHLORIDE 10 MG: 5 TABLET, FILM COATED ORAL at 07:27

## 2021-01-01 RX ADMIN — ACETAMINOPHEN 975 MG: 325 TABLET, FILM COATED ORAL at 08:37

## 2021-01-01 RX ADMIN — GLIPIZIDE 10 MG: 10 TABLET ORAL at 09:37

## 2021-01-01 RX ADMIN — ACETAMINOPHEN 975 MG: 325 TABLET, FILM COATED ORAL at 20:11

## 2021-01-01 RX ADMIN — METFORMIN HYDROCHLORIDE 500 MG: 500 TABLET, FILM COATED ORAL at 17:36

## 2021-01-01 RX ADMIN — LISINOPRIL AND HYDROCHLOROTHIAZIDE 2 TABLET: 12.5; 2 TABLET ORAL at 08:28

## 2021-01-01 RX ADMIN — INSULIN DEGLUDEC INJECTION 50 UNITS: 100 INJECTION, SOLUTION SUBCUTANEOUS at 22:07

## 2021-01-01 RX ADMIN — INSULIN DEGLUDEC INJECTION 50 UNITS: 100 INJECTION, SOLUTION SUBCUTANEOUS at 22:31

## 2021-01-01 RX ADMIN — METOPROLOL SUCCINATE 50 MG: 50 TABLET, EXTENDED RELEASE ORAL at 07:27

## 2021-01-01 RX ADMIN — ACETAMINOPHEN 975 MG: 325 TABLET, FILM COATED ORAL at 08:28

## 2021-01-01 RX ADMIN — INSULIN ASPART 1 UNITS: 100 INJECTION, SOLUTION INTRAVENOUS; SUBCUTANEOUS at 22:07

## 2021-01-01 RX ADMIN — HUMAN ALBUMIN MICROSPHERES AND PERFLUTREN 2 ML: 10; .22 INJECTION, SOLUTION INTRAVENOUS at 11:57

## 2021-01-01 RX ADMIN — ATORVASTATIN CALCIUM 40 MG: 40 TABLET, FILM COATED ORAL at 22:31

## 2021-01-01 RX ADMIN — AMIODARONE HYDROCHLORIDE 200 MG: 200 TABLET ORAL at 20:22

## 2021-01-01 RX ADMIN — METFORMIN HYDROCHLORIDE 500 MG: 500 TABLET, FILM COATED ORAL at 09:37

## 2021-01-01 RX ADMIN — TRAMADOL HYDROCHLORIDE 25 MG: 50 TABLET ORAL at 15:38

## 2021-01-01 RX ADMIN — DICLOFENAC SODIUM 2 G: 10 GEL TOPICAL at 13:12

## 2021-01-01 RX ADMIN — CYCLOBENZAPRINE HYDROCHLORIDE 10 MG: 5 TABLET, FILM COATED ORAL at 19:53

## 2021-01-01 RX ADMIN — ATORVASTATIN CALCIUM 40 MG: 40 TABLET, FILM COATED ORAL at 22:03

## 2021-01-01 RX ADMIN — METFORMIN HYDROCHLORIDE 500 MG: 500 TABLET, FILM COATED ORAL at 17:13

## 2021-01-01 RX ADMIN — MICONAZOLE NITRATE: 20 POWDER TOPICAL at 20:30

## 2021-01-01 RX ADMIN — WARFARIN SODIUM 5 MG: 5 TABLET ORAL at 19:52

## 2021-01-01 RX ADMIN — AMIODARONE HYDROCHLORIDE 200 MG: 200 TABLET ORAL at 20:11

## 2021-01-01 RX ADMIN — ACETAMINOPHEN 650 MG: 325 TABLET, FILM COATED ORAL at 10:21

## 2021-01-01 RX ADMIN — DICLOFENAC SODIUM 2 G: 10 GEL TOPICAL at 12:27

## 2021-01-01 RX ADMIN — AMIODARONE HYDROCHLORIDE 200 MG: 200 TABLET ORAL at 13:53

## 2021-01-01 RX ADMIN — ONDANSETRON 4 MG: 2 INJECTION INTRAMUSCULAR; INTRAVENOUS at 00:38

## 2021-01-01 RX ADMIN — ACETAMINOPHEN 1000 MG: 500 TABLET, FILM COATED ORAL at 18:42

## 2021-01-01 RX ADMIN — INSULIN DEGLUDEC INJECTION 50 UNITS: 100 INJECTION, SOLUTION SUBCUTANEOUS at 22:28

## 2021-01-01 RX ADMIN — SODIUM CHLORIDE: 9 INJECTION, SOLUTION INTRAVENOUS at 11:03

## 2021-01-01 RX ADMIN — DICLOFENAC SODIUM 2 G: 10 GEL TOPICAL at 13:53

## 2021-01-01 RX ADMIN — AMIODARONE HYDROCHLORIDE 200 MG: 200 TABLET ORAL at 08:58

## 2021-01-01 RX ADMIN — DEXTROSE 15 G: 15 GEL ORAL at 08:59

## 2021-01-01 RX ADMIN — GLIPIZIDE 10 MG: 10 TABLET ORAL at 17:36

## 2021-01-01 ASSESSMENT — MIFFLIN-ST. JEOR: SCORE: 1356.77

## 2021-01-01 ASSESSMENT — ENCOUNTER SYMPTOMS
SHORTNESS OF BREATH: 0
ARTHRALGIAS: 1
BACK PAIN: 0

## 2021-09-02 PROBLEM — M71.21 BAKER'S CYST OF KNEE, RIGHT: Status: ACTIVE | Noted: 2021-01-01

## 2021-09-02 PROBLEM — M25.561 ACUTE PAIN OF RIGHT KNEE: Status: ACTIVE | Noted: 2021-01-01

## 2021-09-02 NOTE — ED TRIAGE NOTES
Pt arrives via EMS, EMS reports that pt comes from home where she lives on the upper level oft a home with stairs. PT reports that she has been having right hip pain since Sunday, no trauma to right hip but had a hip replacement on the right side before. PT VSS and ABC's intact

## 2021-09-02 NOTE — ED PROVIDER NOTES
History   Chief Complaint:  Hip Pain     The history is provided by the patient.      Ruthy Becerril is a 80 year old female with history of hypertension, hyperlipidemia, and Diabetes Mellitus Type 2 who presents with right hip pain. Four days ago, while she was going down the stairs she states her right hip gave out. She did not collapse. Notes associated right knee pain. Denies back pain, chest pain, and shortness of breath. She took ibuprofen which temporarily relieved her pain, but she has not taken any today. Mentions past right hip surgery.     Review of Systems   Respiratory: Negative for shortness of breath.    Cardiovascular: Negative for chest pain.   Musculoskeletal: Positive for arthralgias. Negative for back pain.   All other systems reviewed and are negative.    Allergies:  Oxycodone-Acetaminophen    Medications:  Albuterol   Amlodipine  Glipizide   Lopressor   Atorvastatin  Metformin   Lisinopril-hydrochlorothiazide     Past Medical History:    COPD   Morbid obesity   Tobacco abuse   Tinea pedis   Diabetes Mellitus Type 2   Hyperlipidemia   Hypertension   Disorder of uterus     Past Surgical History:    Vaginal Hysterectomy   Hernia repair   Cholecystectomy   Total right hip replacement     Family History:    Father: heart disease, dementia   Mother: hypertension, stroke  Sister: leukemia     Social History:  Presents alone.   PCP: Gary Liz  Arrival via EMS.     Physical Exam     Patient Vitals for the past 24 hrs:   BP Temp Temp src Pulse Resp SpO2 Weight   09/02/21 1805 107/83 -- -- -- -- -- 86.2 kg (190 lb)   09/02/21 1804 -- 98.4  F (36.9  C) Oral 88 16 96 % --     Physical Exam  General: Alert, appears elderly, otherwise well-developed and well-nourished. Cooperative.     In mild distress  HEENT:  Head:  Atraumatic  Ears:  External ears are normal  Mouth/Throat:  Oropharynx is without erythema or exudate and mucous membranes are moist.   Eyes:   Conjunctivae normal and EOM are  normal. No scleral icterus.    Pupils are equal, round, and reactive to light.   CV:  Normal rate, regular rhythm, normal heart sounds and radial pulses are 2+ and symmetric.  No murmur.  Resp:  Breath sounds are coarse bilaterally with subtle expiratory wheezing bilaterally.     Non-labored, no retractions or accessory muscle use  GI:  Abdomen is soft, no distension, no tenderness. No rebound or guarding.  No CVA tenderness bilaterally  MS:  Normal range of motion. No edema.    Back atraumatic.    No midline cervical, thoracic, or lumbar tenderness    Right Hip:    There is decreased ROM of the hip    Flexion and Extension of the hip is normal    There is no evidence of clinical dislocation    There is no hematoma or obvious bursitis    The femur appears normal and without pain    There is no hematoma to the thigh    Sensory and Motor exam to the thigh and distal leg are normal    The knee has some mild swelling to the joint and no overlying redness.  She has limited flexion and extension at the right knee due to effusion and mild discomfort.      Distal shin, ankle, and foot are without pain    Normal distal pulses are detected  Skin:  Warm and dry.  No rash or lesions noted.  Neuro: Alert. Normal strength.  Sensation intact in all 4 extremities. GCS: 15  Psych:  Normal mood and affect.    Emergency Department Course     Imaging:  XR Pelvis w Hip Right G/E 2 Views  IMPRESSION: Postoperative changes of right total hip arthroplasty. Components appear well-seated. 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 There is trabecular lucency traversing the left hip and dedicated views recommended to exclude   fracture. Pelvis negative for fracture. Vascular calcifications.  Reading per radiology    XR Knee Right 1/2 Views  IMPRESSION: Degenerative change within all 3 compartments of the right knee. No evidence for acute fracture. No significant effusion. Osteophytic spurring about the patella and lateral  joint line. Chronic enthesitis at the extensor tendon attachments.   Vascular calcifications.  Reading per radiology    US Lower Extremity Venous Duplex Right   Impression:  1.  No deep venous thrombosis in the right lower extremity.   2.  4.8 cm Baker's cyst.    Laboratory:  CBC: WBC 14.9 (H) , HGB 13.6,    BMP: urea nitrogen 40 (H), Glucose 200 (H), GFR 45 (L), Creatinine 1.17 (H) o/w WNL  Asymptomatic COVID19 Virus PCR by nasopharyngeal swab pending     Emergency Department Course:    Reviewed:  I reviewed nursing notes, vitals, past medical history and care everywhere    Assessments:  1810 I obtained history and examined the patient as noted above.   1930 I rechecked the patient and explained findings.   2006  I spoke to her daughter, Caity.     Consults:   2221 : I spoke with Dr. Frenando of the Hospitalist service from [facility] regarding patient's presentation, findings, and plan of care.    Interventions:  1842 Tylenol 1,000mg Oral   1842 Advil 600mg Oral   2009 Flexeril 10mg Oral     Disposition:  The patient was admitted to the hospital under the care of Dr. Fernando.     Impression & Plan     CMS Diagnoses: None    Medical Decision Making:  Patient is a 80-year-old female who presents with ongoing right lower extremity pain since Monday.  In discussion with the patient's daughter-in-law, she has been bedbound since Monday after coming down the stairs and having sudden onset pain in the right thigh and back of the right knee.  Patient has been unable to care for self and family is having difficulties caring for the patient as well at home due to immobility and continued pain.  She had minimal improvement with Tylenol and ibuprofen here.  We tried a muscle relaxant with cyclobenzaprine with minimal improvement as well.  Xray of right hip and knee unremarkable for acute fracture and/or dislocation.  Ultimately, I was able to pinpoint the patient's pain in the posterior aspect of the right knee and  ultrasound confirms a Baker's cyst.  It does not appear ruptured at this time and reassuringly no evidence of DVT.  I do suspect the Baker's cyst to cause the patient's discomfort at this time.  Unfortunately she remains unable to ambulate safely with a walker and so will be admitted for likely rehab placement in the next 24 hours and physical therapy assessment.  I spoke with Dr. Fernando of the hospital service who agreed observation admission.    Diagnosis:    ICD-10-CM    1. Baker's cyst of knee, right  M71.21    2. Acute pain of right knee  M25.561      Scribe Disclosure:  I, Jesús Curran, am serving as a scribe at 6:08 PM on 9/2/2021 to document services personally performed by Robert Nagy MD based on my observations and the provider's statements to me.        Robert Nagy MD  09/02/21 7629

## 2021-09-03 NOTE — PROVIDER NOTIFICATION
12:46 AM  Provider notified: x cover  Reason: pt has redness under breasts that looks and smell like yeast. Can we get order for nystatin powder?  Order:

## 2021-09-03 NOTE — CONSULTS
Discharge Pharmacy Test Claim    Patient did not elect Medicare D thus has NO PRESCRIPTION COVERAGE.      Discharge Pharmacy has one-time use 30-day free trial voucher for xarelto, eliquis, or pradaxa. Subsequent fills would be $632/mo (Xarelto), $640/mo (Eliquis), or $612/mo (Pradaxa).     Both Xarelto and Eliquis have income-based patient assistance programs that ships free drug directly to pt's home if eligible. [employer] information and applications can be found at:   Xarelto: https://www.dooub.org, ph: 5-484-527-9605  Eliquis: http://www.[facility].org, ph: 5-035-014-8401    For comparison, 14 syringes of enoxaparin without insurance is $293 and 30 tablets of jantoven/warfarin is $16.     Medicare D open enrollment runs October 15-December 7th to sign up for 2022 drug coverage. Patient can call [place]'s [employer] Linkage Line for Medicare Part D enrollment options at [phone].        Larissa Mcneill  Covering Shyla Velasco 9/3  [facility] Liaison  Ph: [phone] Pager: [phone]

## 2021-09-03 NOTE — PROGRESS NOTES
PRIMARY DIAGNOSIS: CHEST PAIN  OUTPATIENT/OBSERVATION GOALS TO BE MET BEFORE DISCHARGE:  1. Ruled out acute coronary syndrome (negative or stable Troponin):  stable, trending labs  2. Pain Status: Improved-controlled with oral pain medications.  3. Appropriate provocative testing performed: Yes  - Stress Test Procedure: Echo  - Interpretation of cardiac rhythm per telemetry tech: SR    4. Cleared by Consultants (if applicable):No  5. Return to near baseline physical activity: No  Discharge Planner Nurse   Safe discharge environment identified: No  Barriers to discharge: Yes       Entered by: Saba De La Cruz 09/03/2021 3:04 PM     Please review provider order for any additional goals.   Nurse to notify provider when observation goals have been met and patient is ready for discharge.    Pain still in right knee and spreading to left leg- mid thigh

## 2021-09-03 NOTE — ED NOTES
[facility]  ED Nurse Handoff Report    Ruthy Becerril is a 80 year old female   ED Chief complaint: Hip Pain  . ED Diagnosis:   Final diagnoses:   Baker's cyst of knee, right   Acute pain of right knee     Allergies:   Allergies   Allergen Reactions     Oxycodone-Acetaminophen Rash       Code Status: Full Code  Activity level - Baseline/Home:  Stand by Assist. Activity Level - Current:   Assist X 1. Lift room needed: No. Bariatric: No   Needed: No   Isolation: No. Infection: Not Applicable.     Vital Signs:   Vitals:    09/02/21 1804 09/02/21 1805   BP:  107/83   Pulse: 88    Resp: 16    Temp: 98.4  F (36.9  C)    TempSrc: Oral    SpO2: 96%    Weight:  86.2 kg (190 lb)       Cardiac Rhythm:  ,      Pain level:    Patient confused: No. Patient Falls Risk: Yes.   Elimination Status: Unable to void   Patient Report - Initial Complaint: pt complains of right hip and knee pain and unable to walk.  Can pivot   Tests Performed:   Abnormal Labs Reviewed   BASIC METABOLIC PANEL - Abnormal; Notable for the following components:       Result Value    Urea Nitrogen 40 (*)     Creatinine 1.17 (*)     Glucose 200 (*)     GFR Estimate 45 (*)     All other components within normal limits   CBC WITH PLATELETS AND DIFFERENTIAL - Abnormal; Notable for the following components:    WBC Count 14.9 (*)     Absolute Neutrophils 11.0 (*)     Absolute Immature Granulocytes 0.1 (*)     All other components within normal limits   . Abnormal Results: see above.   Treatments provided: meds  OBS brochure/video discussed/provided to patient:  Yes  ED Medications:   Medications   acetaminophen (TYLENOL) tablet 1,000 mg (1,000 mg Oral Given 9/2/21 1842)   ibuprofen (ADVIL/MOTRIN) tablet 600 mg (600 mg Oral Given 9/2/21 1842)   cyclobenzaprine (FLEXERIL) tablet 10 mg (10 mg Oral Given 9/2/21 2009)     Drips infusing:  No  For the majority of the shift, the patient's behavior Green..    Sepsis treatment initiated: No      Patient tested for COVID 19 prior to admission: YES    ED Nurse Name/Phone Number: Clare Gonzalez RN,   11:42 PM  RECEIVING UNIT ED HANDOFF REVIEW    Above ED Nurse Handoff Report was reviewed: Yes  Reviewed by: Marah Paz RN on September 2, 2021 at 11:51 PM

## 2021-09-03 NOTE — PROVIDER NOTIFICATION
Writer reviewed tele monitor  SR was confirmed by tele tech  MD made aware- EKG ordered and SR found

## 2021-09-03 NOTE — CONSULTS
Cardiology Consultation:    Ruthy Becerril MRN#: [id number]   YOB: 1940 Age: 80 year old     Date of Admission: 9/2/2021  Consult indication: atrial fibrillation         Assessment and Plan / Recommendations:      # Paroxysmal atrial fibrillation, symptomatic, [id number].  Converted spontaneously to normal sinus rhythm.  # HTN  # HL  # DM2  # DJD  # COPD    -Discussed options for further evaluation and management.  Patient is already on metoprolol tartrate 100 mg twice daily, with a resting heart rate of approximately 60 bpm.  As such, adding additional AV shannon blocking agents will likely cause bradycardia which would be problematic in this elderly female with limited mobility, high risk for falls.  Discussed starting amiodarone, including possible side effects that may include but are not limited to toxicity of the eyes, liver, lungs, thyroid, skin.  Discussed risk/benefits at length.  Patient is agreeable to starting amiodarone, will start amiodarone 200 mg 3 times daily for 2 weeks, then 200 mg daily thereafter.  Will decrease metoprolol tartrate 100 mg twice daily to metoprolol succinate 50 mg daily.  TSH normal.  We will add on hepatic panel.  Cardiac event monitor on discharge.  Follow-up with cardiology ordered in navigator.  -Discussed anticoagulation, risk/benefits.  Patient denies any abnormal bleeding/bruising.  Due to financial constraints, patient would like to start the least expensive option, is amenable to starting warfarin, Pharmacy assistance appreciated.  -Cardiology will sign off, however please not hesitate to page or call with any questions or concerns    Thank you for allowing our team to participate in the care of Ruthy Becerril. Please do not hesitate to page me with any questions or concerns.    Isrrael Willams MD, [facility]  Cardiology  Text Page   September 3, 2021    Voice recognition software utilized. Although reviewed after completion, some word and  grammatical errors may be present.           History of Present Illness:     I had the opportunity to see patient Ruthy Becerril at [facility] for a cardiology consultation.     As you know, patient is a pleasant 80-year-old female with a past medical history significant for diabetes, hypertension, hyperlipidemia, obesity, COPD, admitted on 9/2/2021 with significant right knee pain and inability to ambulate.    Patient denies any recent episodes of chest pain, chest pressure, abnormal shortness of breath.  She does note occasional palpitations, however these are infrequent, and last for several seconds to several minutes at a time.  No associated dizziness/lightheadedness.    As part of her evaluation, an ultrasound was done which demonstrated a 4.8 cm Baker's cyst.  Orthopedic surgery was consulted and conservative management was recommended for severe DJD.    Earlier this morning, patient reported acute onset palpitations and chest discomfort.  An ECG was done which demonstrated atrial fibrillation with RVR.  She converted back to normal sinus rhythm shortly thereafter.  A TTE was done while she was in atrial fibrillation with RVR, and this demonstrated hyperdynamic LV function with an intracavitary gradient, as well as apical hypokinesis.  Repeat TTE while in sinus rhythm demonstrated resolution of these findings, there was normal biventricular function, no significant intracavitary gradient, no abnormal wall motion. Serial troponins normal.    Patient has no prior history of paroxysmal atrial fibrillation.  No prior history of cardiac disease.  She does not drink alcohol.  No family history of arrhythmia.         Past Medical History:   I have reviewed this patient's past medical history  DM  HTN  HL  COPD         Past Surgical History:   I have reviewed this patient's past surgical history   No prior cardiac surgical history         Social History:   I have reviewed this patient's social history  Social History      Tobacco Use     Smoking status: Not on file   Substance Use Topics     Alcohol use: Not on file             Family History:   I have reviewed this patient's family history  No family history on file.          Allergies:   I have reviewed this patient's allergy history  Allergies   Allergen Reactions     Oxycodone-Acetaminophen Rash             Medications reviewed:   Prior to admission medications:  Prior to Admission medications    Medication Sig Start Date End Date Taking? Authorizing Provider   amLODIPine (NORVASC) 10 MG tablet Take 10 mg by mouth daily   Yes Unknown, Entered By History   atorvastatin (LIPITOR) 40 MG tablet Take 40 mg by mouth At Bedtime    Yes Unknown, Entered By History   glipiZIDE (GLUCOTROL) 10 MG tablet Take 10 mg by mouth 2 times daily (before meals)   Yes Unknown, Entered By History   insulin degludec (TRESIBA) 100 UNIT/ML pen Inject 50 Units Subcutaneous At Bedtime   Yes Unknown, Entered By History   lisinopril-hydrochlorothiazide (ZESTORETIC) 20-12.5 MG tablet Take 2 tablets by mouth daily   Yes Unknown, Entered By History   metFORMIN (GLUCOPHAGE) 500 MG tablet Take 500 mg by mouth 2 times daily (with meals)   Yes Unknown, Entered By History   metoprolol tartrate (LOPRESSOR) 100 MG tablet Take 100 mg by mouth 2 times daily   Yes Unknown, Entered By History      Current medications:  Current Facility-Administered Medications Ordered in Epic   Medication Dose Route Frequency Last Rate Last Admin     acetaminophen (TYLENOL) tablet 975 mg  975 mg Oral TID   975 mg at 09/03/21 1538     albuterol (PROAIR HFA/PROVENTIL HFA/VENTOLIN HFA) 108 (90 Base) MCG/ACT inhaler 2 puff  2 puff Inhalation 4x Daily PRN         amiodarone (PACERONE) tablet 200 mg  200 mg Oral TID         [START ON 9/18/2021] amiodarone (PACERONE) tablet 200 mg  200 mg Oral Daily         amLODIPine (NORVASC) tablet 10 mg  10 mg Oral Daily   10 mg at 09/03/21 1024     atorvastatin (LIPITOR) tablet 40 mg  40 mg Oral At  Bedtime         calcium carbonate (TUMS) chewable tablet 500 mg  500 mg Oral Daily PRN   500 mg at 09/03/21 1304     cyclobenzaprine (FLEXERIL) tablet 10 mg  10 mg Oral TID         glucose gel 15-30 g  15-30 g Oral Q15 Min PRN        Or     dextrose 50 % injection 25-50 mL  25-50 mL Intravenous Q15 Min PRN        Or     glucagon injection 1 mg  1 mg Subcutaneous Q15 Min PRN         diclofenac (VOLTAREN) 1 % topical gel 2 g  2 g Topical 4x Daily         glipiZIDE (GLUCOTROL) tablet 10 mg  10 mg Oral BID AC   10 mg at 09/03/21 1736     hydrALAZINE (APRESOLINE) injection 10 mg  10 mg Intravenous Q4H PRN         insulin aspart (NovoLOG) injection (RAPID ACTING)  1-7 Units Subcutaneous TID AC   2 Units at 09/03/21 1732     insulin aspart (NovoLOG) injection (RAPID ACTING)  1-5 Units Subcutaneous At Bedtime   2 Units at 09/03/21 0112     insulin degludec (TRESIBA) 100 UNIT/ML injection 50 Units  50 Units Subcutaneous At Bedtime         lidocaine (XYLOCAINE) 2 % 15 mL, alum & mag hydroxide-simethicone (MAALOX) 15 mL GI Cocktail  30 mL Oral Once PRN         lisinopril-hydrochlorothiazide (ZESTORETIC) 20-12.5 MG per tablet 2 tablet  2 tablet Oral Daily   2 tablet at 09/03/21 1025     melatonin tablet 1 mg  1 mg Oral At Bedtime PRN         metFORMIN (GLUCOPHAGE) tablet 500 mg  500 mg Oral BID w/meals   500 mg at 09/03/21 1736     metoprolol succinate ER (TOPROL-XL) 24 hr tablet 50 mg  50 mg Oral Daily         naloxone (NARCAN) injection 0.2 mg  0.2 mg Intravenous Q2 Min PRN        Or     naloxone (NARCAN) injection 0.4 mg  0.4 mg Intravenous Q2 Min PRN        Or     naloxone (NARCAN) injection 0.2 mg  0.2 mg Intramuscular Q2 Min PRN        Or     naloxone (NARCAN) injection 0.4 mg  0.4 mg Intramuscular Q2 Min PRN         No anticoagulants IF patient has had acute trauma/surgery or recent intracranial, GI or urinary tract bleeding.    Other DOES NOT GO TO MAR         ondansetron (ZOFRAN-ODT) ODT tab 4 mg  4 mg Oral Q6H PRN         Or     ondansetron (ZOFRAN) injection 4 mg  4 mg Intravenous Q6H PRN         traMADol (ULTRAM) half-tab 25-50 mg  25-50 mg Oral Q6H PRN   25 mg at 21 1538     Warfarin Therapy Reminder (Check START DATE - warfarin may be starting in the FUTURE)  1 each Does not apply Continuous PRN         No current [facility]-ordered outpatient medications on file.             Review of Systems:   A complete review of systems was performed and was negative except as mentioned in the HPI.          Physical Exam:   Vital signs were personally reviewed:  Temperatures:  Current - Temp: 98.2  F (36.8  C); Max - Temp  Av  F (36.7  C)  Min: 97.5  F (36.4  C)  Max: 98.4  F (36.9  C)  Respiration range: Resp  Av.7  Min: 16  Max: 18  Pulse range: Pulse  Av  Min: 62  Max: 125  Blood pressure range: Systolic (24hrs), Av , Min:107 , Max:144   ; Diastolic (24hrs), Av, Min:43, Max:83    Pulse oximetry range: SpO2  Av.3 %  Min: 92 %  Max: 98 %    Intake/Output Summary (Last 24 hours) at 9/3/2021 1740  Last data filed at 9/3/2021 0705  Gross per 24 hour   Intake --   Output 400 ml   Net -400 ml     189 lbs 9.6 oz  Body mass index is 30.6 kg/m .   Body surface area is 2 meters squared.    Constitutional: appears stated age, in no apparent distress, appears to be well nourished  Eyes: sclera anicteric, conjunctiva normal, no lesions on eyelids or lashes  ENT: normocephalic, without obvious abnormality, atraumatic, external ears without lesions,   Pulmonary: clear to auscultation bilaterally, no wheezes, no rales, no increased work of breathing  Cardiovascular: JVP normal, regular rate, regular rhythm, 1/6 AUGUSTINE at the RUSB, no lower extremity edema  Gastrointestinal: abdominal exam benign, non-tender, no rigidity, no guarding  Neurologic: awake, alert, face symmetrical, moves all extremities  Skin: no abnormal rashes or lesions on limited exam, nails normal without discoloration or clubbing, no jaundice  Psychiatric:  affect is normal, answers questions appropriately, oriented to self and place         Laboratory tests:   Laboratory tests personally reviewed:   CMP  Recent Labs   Lab 21  1157 21  1100 21  0737 21  0532 21  0420 21   NA  --   --   --  139  --  140   POTASSIUM  --  3.8  --  3.3*  --  3.7   CHLORIDE  --   --   --  107  --  107   CO2  --   --   --  28  --  25   ANIONGAP  --   --   --  4  --  8   *  --  194* 272* 270* 200*   BUN  --   --   --  42*  --  40*   CR  --   --   --  1.02  --  1.17*   GFRESTIMATED  --   --   --  53*  --  45*   NATACHA  --   --   --  9.5  --  9.8   MAG  --   --   --  2.1  --   --      CBC  Recent Labs   Lab 21   WBC 10.1 14.9*   RBC 4.54 4.72   HGB 12.7 13.6   HCT 39.8 41.3   MCV 88 88   MCH 28.0 28.8   MCHC 31.9 32.9   RDW 13.0 13.0    335     INRNo lab results found in last 7 days.  Lab Results   Component Value Date    TROPONIN 0.016 2021    TROPONIN 0.028 2021     No results for input(s): CHOL, HDL, LDL, TRIG, CHOLHDLRATIO in the last 51786 hours.  Lab Results   Component Value Date    A1C 7.0 2021     TSH   Date Value Ref Range Status   2021 1.20 0.40 - 4.00 mU/L Final            Imaging and Additional Data:   Additional data personally reviewed:  Recent Results (from the past 4320 hour(s))   Echocardiogram Limited   Result Value    LVEF  65-70%    Narrative    [id number]  [id number]  [id number]  [id number]^RAYNE^JONATHON^BUCKY     [facility]  Echocardiography Laboratory  [address] [place] [address]     Name: DAKOTA BANKS  MRN: [id number]  : 1940  Study Date: 2021 03:02 PM  Age: 80 yrs  Gender: Female  Patient Location: [facility]  Reason For Study: Atrial Fibrillation  Ordering Physician: JONATHON MCLAIN  Referring Physician: Gary Miller  Performed By: Adela Wright RDCS     BSA: 2.0 m2  Height: 66 in  Weight: 189 lb  HR: 69  BP: 144/72  mmHg  ______________________________________________________________________________  Procedure  Limited Portable Echo Adult. Optison (NDC #[id number]) given intravenously.  ______________________________________________________________________________  Interpretation Summary     Left ventricular systolic function is normal. The visual ejection fraction is  65-70%. No regional wall motion abnormalities noted.  Echo findings are not consistent with left ventricular outflow obstruction.  Right ventricle is not well visualized, however global systolic function is  probably normal.  Sinus rhythm was noted.     Compared to a prior TTE from earlier today, rhythm is now NSR, LV function is  less hyperdynamic, prior LVOT gradient and apical hypokinesis has resolved.  ______________________________________________________________________________  Left Ventricle  Echo findings are not consistent with left ventricular outflow obstruction.  Left ventricular systolic function is normal. The visual ejection fraction is  65-70%. No regional wall motion abnormalities noted.     Right Ventricle  The right ventricle is not well visualized. Right ventricle is not well  visualized, however global systolic function is probably normal.     Rhythm  Sinus rhythm was noted.     ______________________________________________________________________________  Report approved by: Yumiko Dykes 2021 03:37 PM     ______________________________________________________________________________      Echocardiogram Complete   Result Value    LVEF  75%    Narrative    [id number]  [facility] hospital  [id number]  [id number]^LEATHA^ARNAUD^IRENE     [facility]  Echocardiography Laboratory  [address] [place] [address]     Name: DAKOTA BANKS  MRN: [id number]  : 1940  Study Date: 2021 11:25 AM  Age: 80 yrs  Gender: Female  Patient Location: [facility]  Reason For Study: Atrial Fibrillation  Ordering Physician: ARNAUD ZHANG  RAY  Performed By: Adela Wright RDCS     BSA: 2.0 m2  Height: 66 in  Weight: 189 lb  BP: 133/57 mmHg  ______________________________________________________________________________  Procedure  Complete Portable Echo Adult. Optison (NDC #[id number]) given intravenously.  ______________________________________________________________________________  Interpretation Summary     The visual ejection fraction is estimated at 75%.  At rest the peak intracavitatory gradient is 78.4 mmHg.  Mildly decreased right ventricular systolic function  Valvular aortic stenosis can not be excluded in this study as the dynamic LVOT  gradient masks the aortic valve gradient and the aortic valve is not well seen  on this study.  The ascending aorta is Borderline dilated.  The study was technically difficult.  ______________________________________________________________________________  Left Ventricle  The left ventricle is normal in size. There is borderline eccentric left  ventricular hypertrophy. The visual ejection fraction is estimated at 75%.  Diastolic Doppler findings (E/E' ratio and/or other parameters) suggest left  ventricular filling pressures are indeterminate. A mid-cavitary gradient is  present. At rest the peak intracavitatory gradient is 78.4 mmHg. There is  severe apical wall hypokinesis.     Right Ventricle  The right ventricle is normal size. Mildly decreased right ventricular  systolic function.     Atria  Normal left atrial size. Right atrial size is normal. There is no color  Doppler evidence of an atrial shunt.     Mitral Valve  There is mild mitral annular calcification. No systolic anterior motion of the  mitral valve. There is trace mitral regurgitation.     Tricuspid Valve  There is trace tricuspid regurgitation. The right ventricular systolic  pressure is approximated at 25.6 mmHg plus the right atrial pressure.     Aortic Valve  The aortic valve is not well visualized. Thickened aortic valve leaflets.  No  aortic regurgitation is present. Valvular aortic stenosis can not be excluded  in this study as the dynamic LVOT gradient masks the aortic valve gradient and  the aortic valve is not well seen on this study.     Pulmonic Valve  The pulmonic valve is not well visualized. There is no pulmonic valvular  regurgitation.     Vessels  The aortic root is normal size. The ascending aorta is Borderline dilated. IVC  diameter <2.1 cm collapsing >50% with sniff suggests a normal RA pressure of 3  mmHg.     Pericardium  There is no pericardial effusion.     Rhythm  The rhythm was atrial flutter.  ______________________________________________________________________________  MMode/2D Measurements & Calculations  IVSd: 1.2 cm  LVIDd: 3.3 cm  LVIDs: 1.6 cm  LVPWd: 0.98 cm  FS: 50.7 %  LV mass(C)d: 105.9 grams  LV mass(C)dI: 54.2 grams/m2  Ao root diam: 3.5 cm  LA dimension: 2.8 cm  asc Aorta Diam: 3.4 cm  LA/Ao: 0.79  LVOT diam: 2.2 cm  LVOT area: 3.6 cm2  LA Volume (BP): 36.7 ml  LA Volume Index (BP): 18.8 ml/m2     RWT: 0.60     Doppler Measurements & Calculations  MV E max negrita: 114.5 cm/sec  MV max P.9 mmHg  MV mean PG: 3.9 mmHg  MV V2 VTI: 16.8 cm  MV dec time: 0.16 sec  TR max negrita: 251.6 cm/sec  TR max P.6 mmHg  E/E' av.6  Lateral E/e': 11.1  Medial E/e': 12.1     ______________________________________________________________________________  Report approved by: Yumiko Dumont 2021 12:57 PM            Clinically Significant Risk Factors Present on Admission                  Nephrology: Acute kidney failure, unspecified    Systemic: Chronic Fatigue and Other Debilities: Age-related physical debility  Other reduced mobility

## 2021-09-03 NOTE — PLAN OF CARE
PRIMARY DIAGNOSIS: ACUTE PAIN  OUTPATIENT/OBSERVATION GOALS TO BE MET BEFORE DISCHARGE:  1. Pain Status: Pain free.    2. Return to near baseline physical activity: No    3. Cleared for discharge by consultants (if involved): No    Discharge Planner Nurse   Safe discharge environment identified: Yes  Barriers to discharge: Yes       Entered by: Marah Paz 09/03/2021 6:35 AM   A & O x 4. VSS. Patient states only in pain when Moving. Has not been out of bed. Gave 2 units of rapid insulin per sliding scale. Been sleeping since. Will continue to monitor.   Please review provider order for any additional goals.   Nurse to notify provider when observation goals have been met and patient is ready for discharge.

## 2021-09-03 NOTE — PLAN OF CARE
ROOM # 202/1    Living Situation : at home with son and daughter in law  Facility name:  : son    Activity level at baseline: SBA  Activity level on admit: have not been out of bed      Patient registered to observation; given Patient Bill of Rights; given the opportunity to ask questions about observation status and their plan of care.  Patient has been oriented to the observation room, bathroom and call light is in place.    Discussed discharge goals and expectations with patient/family.

## 2021-09-03 NOTE — H&P
[facility]    History and Physical  Hospitalist       Date of Admission:  9/2/2021    Assessment & Plan   Ruthy Becerril is a 80 year old female with a past medical history of COPD, diabetes mellitus, hypertension, hyperlipidemia and obesity who presents with right-sided knee pain.    #Right knee pain secondary to baker's cyst and degenerative changes: Ms. Becerril was going down the stairs about 4 days ago when she reached the final step, put weight on her right leg and felt like it gave out. She did not fall or lose her balance. There is no loss of consciousness. No preceding chest pain or shortness of breath. No palpitations.  Since then she has had right knee pain. It comes on when she moves her knee or tries to ambulate. She lives at home with her son and grandchildren who have helped her anytime she needs to move by supporting her weight.  She is taken ibuprofen which does help with the pain but only temporarily.  She has not taken any today.  She has had prior surgery on the right hip several years ago.  -X-ray of the knee showed degenerative changes with no evidence of fracture.  X-ray of the hip and pelvis showed severe end-stage degenerative changes involving the left hip, postoperative changes of the right total hip arthroplasty with components well seated, but no evidence of fracture.  Ultrasound of the lower extremity was negative for DVT but showed 4.8 cm Baker's cyst.  -Prn tylenol and cyclobenzaprine  -Consult ortho for consideration of arthrocentesis/injection  -PT consulted.     #SALEEM: Baseline Cr around 0.8.  Creatinine on presentation in 1.17.  BUN elevated at 40. Suspect secondary to dehydration and possible ibuprofen use. Will give IVF overnight.     #Leukocytosis: Suspect stress reaction.  No fever.  No evidence of infection.  Monitor.     Chronic Medical Conditions  #DM2: Pt is chronically on 52 units Lantus qPM, glipizide 10 mg BID, metformin 500 mg BID.  We will start  lantus 30 units QPM with sliding scale insulin for now.  #HTN: Currently takes Metoprolol, Norvasc, HCTZ- Lisinopril.  We will resume in AM once doses verified.  Hydralazine prn. BP's well controlled in ED  #HL: Continue statin once dose verified  #COPD: Resume home inhalers once verified by pharm. Albuterol prn. Not in exacerbation.  #obesity: Complicates cares    DVT Prophylaxis: Pneumatic Compression Devices  Code Status: DNR/DNI.  Pt is adament she would not want to be on ventilator and would not want heroic measures.   Dispo: [place] to observation    Robert Fernando MD    Primary Care Physician   [facility]    Chief Complaint   Knee Pain    History is obtained from the patient, patient's chart discussed with ER physician    History of Present Illness   Ruthy Becerril is a 80 year old female with a past medical history of COPD, diabetes mellitus, hypertension, hyperlipidemia and obesity who presents with right-sided knee pain.    Mrs. Becerril was going down the stairs about 4 days ago when she reached the final step, put weight on her right leg and felt like it gave out. She did not fall or lose her balance. There is no loss of consciousness. No preceding chest pain or shortness of breath. No palpitations.  Since then she has had right knee pain. It comes on when she moves her knee or tries to ambulate. She lives at home with her son and grandchildren who have helped her anytime she needs to move by supporting her weight.   She is taken ibuprofen which does help with the pain but only temporarily.  She has not taken any today.  She has had prior surgery on the right hip several years ago.    In the ED, patient afebrile and hemodynamically stable.  Labs notable for BMP showing creatinine of 1.17 (nl baseline in June 2021).  CBC showed a mild leukocytosis to 14 but otherwise unremarkable.  X-ray of the knee showed degenerative changes with no evidence of fracture.  X-ray of the hip and pelvis showed  severe end-stage degenerative changes involving the left hip, postoperative changes of the right total hip arthroplasty with components well seated, but no evidence of fracture.  Ultrasound of the lower extremity was negative for DVT but showed 4.8 cm Baker's cyst.  Pt was given tylenol, cyclobenzaprine and ibuprofen.      Past Medical History    I have reviewed this patient's medical history and updated it with pertinent information if needed.   COPD   Morbid obesity   Tobacco abuse   Tinea pedis   Diabetes Mellitus Type 2   Hyperlipidemia   Hypertension     Past Surgical History   Vaginal Hysterectomy   Hernia repair   Cholecystectomy   Total right hip replacement     Prior to Admission Medications   None   Albuterol   Amlodipine  Glipizide   Lopressor   Atorvastatin  Metformin   Lisinopril-hydrochlorothiazide     Allergies   Allergies   Allergen Reactions     Oxycodone-Acetaminophen Rash       Social History   Lives in [facility] with son and grandchildren. Nondrinker. +tobacco use    Family History   Father: heart disease, dementia   Mother: hypertension, stroke  Sister: leukemia     Review of Systems   The 10 point Review of Systems is negative other than noted in the HPI or here.     Physical Exam   Temp: 98.4  F (36.9  C) Temp src: Oral BP: 107/83 Pulse: 88   Resp: 16 SpO2: 96 % O2 Device: None (Room air)    Vital Signs with Ranges  Temp:  [98.4  F (36.9  C)] 98.4  F (36.9  C)  Pulse:  [88] 88  Resp:  [16] 16  BP: (107)/(83) 107/83  SpO2:  [96 %] 96 %  190 lbs 0 oz    Constitutional: Lying in bed. No acute distress. Answers appropriately.  HEENT: Normocephalic, MMM, no elevation of JVD noted  Respiratory: Nl WOB, Clear bilaterally, No wheezes, no crackles  Cardiovascular: Regular, no murmur  GI: BS+, NT, ND, no rebound or guarding  Lymph/Hematologic: No bruising. No cervical LAD  Skin: No rash  Musculoskeletal: + Swelling of the right knee with some tenderness to palpation noted. Tender with flexion and  extension of the knee joint. No warmth noted. Minimal edema.  Neurologic: A&Ox3, Answers appropriately. CNII-XII intact. Moves all extremities but has difficulty flexing and extending at the right knee and raising right leg secondary to pain. Station all intact. Negative pronator drift.  Psychiatric: Calm    Data   Data reviewed today:  I personally reviewed   Recent Labs   Lab 09/02/21 2055   WBC 14.9*   HGB 13.6   MCV 88         POTASSIUM 3.7   CHLORIDE 107   CO2 25   BUN 40*   CR 1.17*   ANIONGAP 8   NATACHA 9.8   *       Recent Results (from the past 24 hour(s))   XR Knee Right 1/2 Views    Narrative    EXAM: XR KNEE RT 1 /2 VW  LOCATION: [facility]  DATE/TIME: 9/2/2021 6:59 PM    INDICATION: Right knee pain.  COMPARISON: None.      Impression    IMPRESSION: Degenerative change within all 3 compartments of the right knee. No evidence for acute fracture. No significant effusion. Osteophytic spurring about the patella and lateral joint line. Chronic enthesitis at the extensor tendon attachments.   Vascular calcifications.   XR Pelvis w Hip Right G/E 2 Views    Narrative    EXAM: XR PELVIS AND HIP RIGHT 2 VIEWS  LOCATION: [facility]  DATE/TIME: 9/2/2021 6:59 PM    INDICATION: Right hip pain.  COMPARISON: None.      Impression    IMPRESSION: Postoperative changes of right total hip arthroplasty. Components appear well-seated. 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 There is trabecular lucency traversing the left hip and dedicated views recommended to exclude   fracture. Pelvis negative for fracture. Vascular calcifications.   US Lower Extremity Venous Duplex Right    Narrative    EXAM: US LOWER EXTREMITY VENOUS DUPLEX RIGHT  LOCATION: [facility]  DATE/TIME: 9/2/2021 9:46 PM    INDICATION: Right posterior knee pain, concern for Baker's Cyst vs DVT  COMPARISON: None.  TECHNIQUE: Venous Duplex ultrasound of the  right lower extremity with and without compression, augmentation and duplex. Color flow and spectral Doppler with waveform analysis performed.    FINDINGS: Exam includes the common femoral, femoral, popliteal, and contralateral common femoral veins as well as segmentally visualized deep calf veins and greater saphenous vein.     RIGHT: No deep vein thrombosis. No superficial thrombophlebitis. There is a right popliteal fossa Baker's cyst measuring 4.8 x 2.3 x 1.1 cm.      Impression    IMPRESSION:  1.  No deep venous thrombosis in the right lower extremity.    2.  4.8 cm Baker's cyst.       Clinically Significant Risk Factors Present on Admission

## 2021-09-03 NOTE — CONSULTS
[facility]    Orthopedic Consultation    Ruthy Becerril MRN# [id number]   Age: 80 year old YOB: 1940     Date of Admission:  9/2/2021    Reason for consult: Right knee pain       Requesting physician: Dr. 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 Cooper if pain persists (If cortisone given 2-3 months must pass prior to TKA)           Chief Complaint:   Right knee and hip pain         History of Present Illness:   This patient is a 80 year old female who presents with the following condition requiring a hospital admission:    Four days ago, while she was going down the stairs she states her right hip gave out. She did not collapse. Notes associated right knee pain. Denies back pain, chest pain, and shortness of breath. She took ibuprofen which temporarily relieved her pain, but she has not taken any today. Mentions past right hip surgery.  It comes on when she moves her knee or tries to ambulate. She lives at home with her son and grandchildren who have helped her anytime she needs to move by supporting her weight.  She is taken ibuprofen which does help with the pain but only temporarily.  She has not taken any today.          Past Medical History:   No past medical history on file.          Past Surgical History:   No past surgical history on file.          Social History:     Social History     Tobacco Use     Smoking status: Not on file   Substance Use Topics     Alcohol use: Not on file              Family History:   No family history on file.          Immunizations:     VACCINE/DOSE   Diptheria   DPT   DTAP   HBIG   Hepatitis A   Hepatitis B   HIB   Influenza   Measles   Meningococcal   MMR   Mumps   Pneumococcal   Polio   Rubella   Small Pox   TDAP   Varicella   Zoster             Allergies:     Allergies   Allergen Reactions     Oxycodone-Acetaminophen Rash             Medications:     Current Facility-Administered Medications   Medication     acetaminophen (TYLENOL) tablet 975 mg     albuterol (PROAIR HFA/PROVENTIL HFA/VENTOLIN HFA) 108 (90 Base) MCG/ACT inhaler 2 puff     amLODIPine (NORVASC) tablet 10 mg     atorvastatin (LIPITOR) tablet 40 mg     calcium carbonate (TUMS) chewable tablet 500 mg     cyclobenzaprine (FLEXERIL) tablet 10 mg     glucose gel 15-30 g    Or     dextrose 50 % injection 25-50 mL    Or     glucagon injection 1 mg     diclofenac (VOLTAREN) 1 % topical gel 2 g     glipiZIDE (GLUCOTROL) tablet 10 mg     hydrALAZINE (APRESOLINE) injection 10 mg     insulin aspart (NovoLOG) injection (RAPID ACTING)     insulin aspart (NovoLOG) injection (RAPID ACTING)     insulin degludec (TRESIBA) 100 UNIT/ML injection 50 Units     lidocaine (XYLOCAINE) 2 % 15 mL, alum & mag hydroxide-simethicone (MAALOX) 15 mL GI Cocktail     lisinopril-hydrochlorothiazide (ZESTORETIC) 20-12.5 MG per tablet 2 tablet     melatonin tablet 1 mg     metFORMIN (GLUCOPHAGE) tablet 500 mg     metoprolol tartrate (LOPRESSOR) tablet 100 mg     naloxone (NARCAN) injection 0.2 mg    Or     naloxone (NARCAN) injection 0.4 mg    Or     naloxone (NARCAN) injection 0.2 mg    Or     naloxone (NARCAN) injection 0.4 mg     No anticoagulants IF patient has had acute trauma/surgery or recent intracranial, GI or urinary tract bleeding.      ondansetron (ZOFRAN-ODT) ODT tab 4 mg    Or     ondansetron (ZOFRAN) injection 4 mg     traMADol (ULTRAM) half-tab 25-50 mg             Review of Systems:   CV: NEGATIVE for chest pain,  "palpitations or peripheral edema  C: NEGATIVE for fever, chills, change in weight  E/M: NEGATIVE for ear, mouth and throat problems  R: NEGATIVE for significant cough or SOB          Physical Exam:   All vitals have been reviewed  Patient Vitals for the past 24 hrs:   BP Temp Temp src Pulse Resp SpO2 Height Weight   09/03/21 1534 (!) 143/61 98.2  F (36.8  C) Oral 67 18 92 % -- --   09/03/21 1110 (!) 144/72 97.5  F (36.4  C) Oral (!) 125 18 93 % -- --   09/03/21 1024 133/57 -- -- 109 -- -- -- --   09/03/21 0732 137/60 97.7  F (36.5  C) Oral 69 18 93 % -- --   09/03/21 0426 132/46 97.7  F (36.5  C) Oral 68 18 98 % -- --   09/03/21 0018 124/43 98.4  F (36.9  C) Oral 69 18 95 % 1.676 m (5' 6\") 86 kg (189 lb 9.6 oz)   09/02/21 2300 121/49 -- -- 63 -- -- -- --   09/02/21 2200 130/61 -- -- 62 -- 93 % -- --   09/02/21 1805 107/83 -- -- -- -- -- -- 86.2 kg (190 lb)   09/02/21 1804 -- 98.4  F (36.9  C) Oral 88 16 96 % -- --       Intake/Output Summary (Last 24 hours) at 9/3/2021 1602  Last data filed at 9/3/2021 0705  Gross per 24 hour   Intake --   Output 400 ml   Net -400 ml     Patient laying comfortably in bed   No ecchymosis or erythema over entirety of the right leg  No notably swelling or edema  Mild suprapatellar effusion  Full ROM of right knee - 0 to 90 degrees, limited by position in bed  Stable to varus and valgus stress, mild opening with valgus  Negative anterior drawer  No TTP over medial or lateral joint line  Hip flexes to 100 degrees  Internal rotation 30 degrees, External rotation 15 degrees  No pain with internal/external rotation while in flexion  No TTP over great trochanter  Able to SLR with pain in medial aspect of superior knee  Bilateral calves are soft, non-tender.  Bilateral lower extremity is NVI.  Sensation intact bilateral lower extremities  5/5 motor with resisted dorsi and plantar flexion bilaterally  5/5 EHL  +Dp pulse          Data:   All laboratory data reviewed  Results for orders placed " or performed during the hospital encounter of 09/02/21   XR Pelvis w Hip Right G/E 2 Views     Status: None    Narrative    EXAM: XR PELVIS AND HIP RIGHT 2 VIEWS  LOCATION: [facility]  DATE/TIME: 9/2/2021 6:59 PM    INDICATION: Right hip pain.  COMPARISON: None.      Impression    IMPRESSION: Postoperative changes of right total hip arthroplasty. Components appear well-seated. 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 There is trabecular lucency traversing the left hip and dedicated views recommended to exclude   fracture. Pelvis negative for fracture. Vascular calcifications.   XR Knee Right 1/2 Views     Status: None    Narrative    EXAM: XR KNEE RT 1 /2 VW  LOCATION: [facility]  DATE/TIME: 9/2/2021 6:59 PM    INDICATION: Right knee pain.  COMPARISON: None.      Impression    IMPRESSION: Degenerative change within all 3 compartments of the right knee. No evidence for acute fracture. No significant effusion. Osteophytic spurring about the patella and lateral joint line. Chronic enthesitis at the extensor tendon attachments.   Vascular calcifications.   US Lower Extremity Venous Duplex Right     Status: None    Narrative    EXAM: US LOWER EXTREMITY VENOUS DUPLEX RIGHT  LOCATION: [facility]  DATE/TIME: 9/2/2021 9:46 PM    INDICATION: Right posterior knee pain, concern for Baker's Cyst vs DVT  COMPARISON: None.  TECHNIQUE: Venous Duplex ultrasound of the right lower extremity with and without compression, augmentation and duplex. Color flow and spectral Doppler with waveform analysis performed.    FINDINGS: Exam includes the common femoral, femoral, popliteal, and contralateral common femoral veins as well as segmentally visualized deep calf veins and greater saphenous vein.     RIGHT: No deep vein thrombosis. No superficial thrombophlebitis. There is a right popliteal fossa Baker's cyst measuring 4.8 x 2.3 x 1.1 cm.       Impression    IMPRESSION:  1.  No deep venous thrombosis in the right lower extremity.    2.  4.8 cm Baker's cyst.   CT Knee Right w/o Contrast     Status: None    Narrative    CT KNEE RIGHT WITHOUT CONTRAST 9/3/2021 2:20 PM     HISTORY: Knee trauma, meniscal/ligament injury suspected, x-ray done  (Age >= 1y).    COMPARISON: 9/2/2021 radiographs    Radiation dose for this scan was reduced using automated exposure  control, adjustment of the mA and/or kV according to patient size, or  iterative reconstruction technique.    FINDINGS:  Degenerative arthrosis is noted. This is associated with  moderate to severe patellofemoral lateral facet joint space narrowing  and lateral compartment joint space narrowing. Lateral femoral condyle  medial spurring and hypertrophic change of the tibial spine results in  bone contacting bone. There is a large fragmented spur or articular  body measuring approximately 1.8 x 0.8 cm lateral to the patella  within the lateral joint recess. There are probable small articular  bodies within the popliteal tendon sheath. Articular body is also  noted posterior to the cruciate ligaments. Suprapatellar recess joint  effusion and mild to moderate popliteal cyst are noted. No evidence of  acute fracture. Meniscal chondrocalcinosis is noted.      Impression    IMPRESSION:  1. Advanced osteoarthropathy, greatest in the lateral and  patellofemoral compartments. Multiple articular bodies are noted.  Joint effusion/popliteal cyst is noted.  2. No evidence of acute fracture.    LYDIA WILDER MD         SYSTEM ID:  [id number]   CBC with platelets differential     Status: Abnormal    Narrative    The following orders were created for panel order CBC with platelets differential.  Procedure                               Abnormality         Status                     ---------                               -----------         ------                     CBC with platelets and d...[[id number]]  Abnormal             Final result                 Please view results for these tests on the individual orders.   Basic metabolic panel     Status: Abnormal   Result Value Ref Range    Sodium 140 133 - 144 mmol/L    Potassium 3.7 3.4 - 5.3 mmol/L    Chloride 107 94 - 109 mmol/L    Carbon Dioxide (CO2) 25 20 - 32 mmol/L    Anion Gap 8 3 - 14 mmol/L    Urea Nitrogen 40 (H) 7 - 30 mg/dL    Creatinine 1.17 (H) 0.52 - 1.04 mg/dL    Calcium 9.8 8.5 - 10.1 mg/dL    Glucose 200 (H) 70 - 99 mg/dL    GFR Estimate 45 (L) >60 mL/min/1.73m2   Asymptomatic COVID-19 Virus (Coronavirus) by PCR Nasopharyngeal     Status: Normal    Specimen: Nasopharyngeal; Swab   Result Value Ref Range    SARS CoV2 PCR Negative Negative    Narrative    Testing was performed using the chris  SARS-CoV-2 & Influenza A/B Assay on the chris  Radha  System.  This test should be ordered for the detection of SARS-COV-2 in individuals who meet SARS-CoV-2 clinical and/or epidemiological criteria. Test performance is unknown in asymptomatic patients.  This test is for in vitro diagnostic use under the FDA EUA for laboratories certified under CLIA to perform moderate and/or high complexity testing. This test has not been FDA cleared or approved.  A negative test does not rule out the presence of PCR inhibitors in the specimen or target RNA in concentration below the limit of detection for the assay. The possibility of a false negative should be considered if the patient's recent exposure or clinical presentation suggests COVID-19.  [facility] are certified under the Clinical Laboratory Improvement Amendments of 1988 (CLIA-88) as qualified to perform moderate and/or high complexity laboratory testing.   CBC with platelets and differential     Status: Abnormal   Result Value Ref Range    WBC Count 14.9 (H) 4.0 - 11.0 10e3/uL    RBC Count 4.72 3.80 - 5.20 10e6/uL    Hemoglobin 13.6 11.7 - 15.7 g/dL    Hematocrit 41.3 35.0 - 47.0 %    MCV 88 78 - 100 fL    MCH 28.8  26.5 - 33.0 pg    MCHC 32.9 31.5 - 36.5 g/dL    RDW 13.0 10.0 - 15.0 %    Platelet Count 335 150 - 450 10e3/uL    % Neutrophils 73 %    % Lymphocytes 16 %    % Monocytes 8 %    % Eosinophils 1 %    % Basophils 1 %    % Immature Granulocytes 1 %    NRBCs per 100 WBC 0 <1 /100    Absolute Neutrophils 11.0 (H) 1.6 - 8.3 10e3/uL    Absolute Lymphocytes 2.4 0.8 - 5.3 10e3/uL    Absolute Monocytes 1.2 0.0 - 1.3 10e3/uL    Absolute Eosinophils 0.1 0.0 - 0.7 10e3/uL    Absolute Basophils 0.1 0.0 - 0.2 10e3/uL    Absolute Immature Granulocytes 0.1 (H) <=0.0 10e3/uL    Absolute NRBCs 0.0 10e3/uL   Basic metabolic panel     Status: Abnormal   Result Value Ref Range    Sodium 139 133 - 144 mmol/L    Potassium 3.3 (L) 3.4 - 5.3 mmol/L    Chloride 107 94 - 109 mmol/L    Carbon Dioxide (CO2) 28 20 - 32 mmol/L    Anion Gap 4 3 - 14 mmol/L    Urea Nitrogen 42 (H) 7 - 30 mg/dL    Creatinine 1.02 0.52 - 1.04 mg/dL    Calcium 9.5 8.5 - 10.1 mg/dL    Glucose 272 (H) 70 - 99 mg/dL    GFR Estimate 53 (L) >60 mL/min/1.73m2   CBC with platelets     Status: Normal   Result Value Ref Range    WBC Count 10.1 4.0 - 11.0 10e3/uL    RBC Count 4.54 3.80 - 5.20 10e6/uL    Hemoglobin 12.7 11.7 - 15.7 g/dL    Hematocrit 39.8 35.0 - 47.0 %    MCV 88 78 - 100 fL    MCH 28.0 26.5 - 33.0 pg    MCHC 31.9 31.5 - 36.5 g/dL    RDW 13.0 10.0 - 15.0 %    Platelet Count 309 150 - 450 10e3/uL   Hemoglobin A1c     Status: Abnormal   Result Value Ref Range    Hemoglobin A1C 7.0 (H) 0.0 - 5.6 %   Glucose by meter     Status: Abnormal   Result Value Ref Range    GLUCOSE BY METER POCT 291 (H) 70 - 99 mg/dL   Glucose by meter     Status: Abnormal   Result Value Ref Range    GLUCOSE BY METER POCT 270 (H) 70 - 99 mg/dL   Potassium     Status: Normal   Result Value Ref Range    Potassium 3.8 3.4 - 5.3 mmol/L   Glucose by meter     Status: Abnormal   Result Value Ref Range    GLUCOSE BY METER POCT 194 (H) 70 - 99 mg/dL   TSH with free T4 reflex     Status: Normal   Result  Value Ref Range    TSH 1.20 0.40 - 4.00 mU/L   Magnesium     Status: Normal   Result Value Ref Range    Magnesium 2.1 1.6 - 2.3 mg/dL   Troponin I     Status: Normal   Result Value Ref Range    Troponin I 0.028 0.000 - 0.045 ug/L   Glucose by meter     Status: Abnormal   Result Value Ref Range    GLUCOSE BY METER POCT 243 (H) 70 - 99 mg/dL   Troponin I     Status: Normal   Result Value Ref Range    Troponin I 0.016 0.000 - 0.045 ug/L   EKG 12-lead, tracing only     Status: None (Preliminary result)   Result Value Ref Range    Systolic Blood Pressure  mmHg    Diastolic Blood Pressure  mmHg    Ventricular Rate 74 BPM    Atrial Rate 74 BPM    CA Interval 196 ms    QRS Duration 80 ms     ms    QTc 426 ms    P Axis 74 degrees    R AXIS 9 degrees    T Axis 63 degrees    Interpretation ECG       Sinus rhythm  Possible Inferior infarct , age undetermined  Cannot rule out Anterior infarct , age undetermined  Abnormal ECG  No previous ECGs available     Pharmacy Liaison for Medication Coverage     Status: None ()    Larissa Powell     9/3/2021  3:15 PM  Discharge Pharmacy Test Claim    Patient did not elect Medicare D thus has NO PRESCRIPTION   COVERAGE.      Discharge Pharmacy has one-time use 30-day free trial voucher for   xarelto, eliquis, or pradaxa. Subsequent fills would be $632/mo   (Xarelto), $640/mo (Eliquis), or $612/mo (Pradaxa).     Both Xarelto and Eliquis have income-based patient assistance   programs that ships free drug directly to pt's home if eligible.   Eligibilty information and applications can be found at:   Xarelto: https://www.jjpaf.org, ph: 0-117-507-2665  Eliquis: http://[email or web address], ph: 9-286-688-3340    For comparison, 14 syringes of enoxaparin without insurance is   $293 and 30 tablets of jantoven/warfarin is $16.     Medicare D open enrollment runs October 15-December 7th to sign   up for 2022 drug coverage. Patient can call [place]'s Senior   Linkage Line for Medicare  Part D enrollment options at   [phone].        Larissa Velasco 9/3  [facility] Pharmacy Liaison  Ph: [phone] Pager: [phone]        Echocardiogram Complete     Status: None   Result Value Ref Range    LVEF  75%     Narrative    [id number]  [id number]  [id number]  [id number]^LEATHA^ARNAUD^IRENE     [facility]  Echocardiography Laboratory  [address] [place] [address]     Name: DAKOTA BANKS  MRN: [id number]  : 1940  Study Date: 2021 11:25 AM  Age: 80 yrs  Gender: Female  Patient Location: [facility]  Reason For Study: Atrial Fibrillation  Ordering Physician: ARNAUD ZHANG  Performed By: Adela Wright RDCS     BSA: 2.0 m2  Height: 66 in  Weight: 189 lb  BP: 133/57 mmHg  ______________________________________________________________________________  Procedure  Complete Portable Echo Adult. Optison ([facility] #[id number]) given intravenously.  ______________________________________________________________________________  Interpretation Summary     The visual ejection fraction is estimated at 75%.  At rest the peak intracavitatory gradient is 78.4 mmHg.  Mildly decreased right ventricular systolic function  Valvular aortic stenosis can not be excluded in this study as the dynamic LVOT  gradient masks the aortic valve gradient and the aortic valve is not well seen  on this study.  The ascending aorta is Borderline dilated.  The study was technically difficult.  ______________________________________________________________________________  Left Ventricle  The left ventricle is normal in size. There is borderline eccentric left  ventricular hypertrophy. The visual ejection fraction is estimated at 75%.  Diastolic Doppler findings (E/E' ratio and/or other parameters) suggest left  ventricular filling pressures are indeterminate. A mid-cavitary gradient is  present. At rest the peak intracavitatory gradient is 78.4 mmHg. There is  severe apical wall hypokinesis.      Right Ventricle  The right ventricle is normal size. Mildly decreased right ventricular  systolic function.     Atria  Normal left atrial size. Right atrial size is normal. There is no color  Doppler evidence of an atrial shunt.     Mitral Valve  There is mild mitral annular calcification. No systolic anterior motion of the  mitral valve. There is trace mitral regurgitation.     Tricuspid Valve  There is trace tricuspid regurgitation. The right ventricular systolic  pressure is approximated at 25.6 mmHg plus the right atrial pressure.     Aortic Valve  The aortic valve is not well visualized. Thickened aortic valve leaflets. No  aortic regurgitation is present. Valvular aortic stenosis can not be excluded  in this study as the dynamic LVOT gradient masks the aortic valve gradient and  the aortic valve is not well seen on this study.     Pulmonic Valve  The pulmonic valve is not well visualized. There is no pulmonic valvular  regurgitation.     Vessels  The aortic root is normal size. The ascending aorta is Borderline dilated. IVC  diameter <2.1 cm collapsing >50% with sniff suggests a normal RA pressure of 3  mmHg.     Pericardium  There is no pericardial effusion.     Rhythm  The rhythm was atrial flutter.  ______________________________________________________________________________  MMode/2D Measurements & Calculations  IVSd: 1.2 cm  LVIDd: 3.3 cm  LVIDs: 1.6 cm  LVPWd: 0.98 cm  FS: 50.7 %  LV mass(C)d: 105.9 grams  LV mass(C)dI: 54.2 grams/m2  Ao root diam: 3.5 cm  LA dimension: 2.8 cm  asc Aorta Diam: 3.4 cm  LA/Ao: 0.79  LVOT diam: 2.2 cm  LVOT area: 3.6 cm2  LA Volume (BP): 36.7 ml  LA Volume Index (BP): 18.8 ml/m2     RWT: 0.60     Doppler Measurements & Calculations  MV E max negrita: 114.5 cm/sec  MV max P.9 mmHg  MV mean PG: 3.9 mmHg  MV V2 VTI: 16.8 cm  MV dec time: 0.16 sec  TR max negrita: 251.6 cm/sec  TR max P.6 mmHg  E/E' av.6  Lateral E/e': 11.1  Medial E/e': 12.1      ______________________________________________________________________________  Report approved by: Yumiko Dumont 2021 12:57 PM         Echocardiogram Limited     Status: None   Result Value Ref Range    LVEF  65-70%     Narrative    [id number]  [id number]  [id number]  [id number]^RAYNE^JONATHON^BUCKY     [facility]  Echocardiography Laboratory  [address] [place] [address]     Name: DAKOTA BANKS  MRN: [id number]  : 1940  Study Date: 2021 03:02 PM  Age: 80 yrs  Gender: Female  Patient Location: [facility]  Reason For Study: Atrial Fibrillation  Ordering Physician: JONATHON MCLAIN  Referring Physician: Gary Liz Venice  Performed By: Adela Wright RDCS     BSA: 2.0 m2  Height: 66 in  Weight: 189 lb  HR: 69  BP: 144/72 mmHg  ______________________________________________________________________________  Procedure  Limited Portable Echo Adult. Optison ([facility]C #[id number]) given intravenously.  ______________________________________________________________________________  Interpretation Summary     Left ventricular systolic function is normal. The visual ejection fraction is  65-70%. No regional wall motion abnormalities noted.  Echo findings are not consistent with left ventricular outflow obstruction.  Right ventricle is not well visualized, however global systolic function is  probably normal.  Sinus rhythm was noted.     Compared to a prior TTE from earlier today, rhythm is now NSR, LV function is  less hyperdynamic, prior LVOT gradient and apical hypokinesis has resolved.  ______________________________________________________________________________  Left Ventricle  Echo findings are not consistent with left ventricular outflow obstruction.  Left ventricular systolic function is normal. The visual ejection fraction is  65-70%. No regional wall motion abnormalities noted.     Right Ventricle  The right ventricle is not well visualized. Right ventricle is not  well  visualized, however global systolic function is probably normal.     Rhythm  Sinus rhythm was noted.     ______________________________________________________________________________  Report approved by: Yumiko Dykes 09/03/2021 03:37 PM     ______________________________________________________________________________             Attestation:  I have reviewed today's vital signs, notes, medications, labs and imaging with Dr. Cooper.  Amount of time performed on this consult: 30 minutes.    Tish Sanchez PA-C

## 2021-09-03 NOTE — PHARMACY-ADMISSION MEDICATION HISTORY
Admission medication history interview status for this patient is complete. See EPIC admission navigator for allergy information, prior to admission medications and immunization status.     Medication history interview done, indicate source(s): Patient  Medication history resources (including written lists, pill bottles, clinic record): Sure Scripts  Pharmacy: [facility], [place] ([facility] ok for discharge needs)    Changes made to PTA medication list:  Added: all  Changed: n/a  Reported as Not Taking: n/a  Removed: n/a    Actions taken by pharmacist (provider contacted, etc):None     Additional medication history information:   - Pt reports having been on Lantus insulin for a longer time, then only last week this was changed to a similar, other long-acting insulin in pen-form. Assuming this is degludec, could not find data via [employer] or [employer] to confirm what exact brand this was.    Medication reconciliation/reorder completed by provider prior to medication history?  N   (Y/N)     For patients on insulin therapy:   Do you use sliding scale insulin based on blood sugars? No  What is your pre-meal insulin coverage?  n/a  Do you typically eat three meals a day? 2 - 3  How many times do you check your blood glucose per day? X 2 per day  How many episodes of hypoglycemia do you typically have per month? Did not assess   Do you have a Continuous Glucose Monitor (CGM)?  No    Prior to Admission medications    Medication Sig Last Dose Taking? Auth Provider   amLODIPine (NORVASC) 10 MG tablet Take 10 mg by mouth daily 9/2/2021 at am Yes Unknown, Entered By History   atorvastatin (LIPITOR) 40 MG tablet Take 40 mg by mouth At Bedtime  9/1/2021 at pm Yes Unknown, Entered By History   glipiZIDE (GLUCOTROL) 10 MG tablet Take 10 mg by mouth 2 times daily (before meals) 9/2/2021 at am Yes Unknown, Entered By History   insulin degludec (TRESIBA) 100 UNIT/ML pen Inject 50 Units Subcutaneous At Bedtime 9/1/2021 at pm Yes  Unknown, Entered By History   lisinopril-hydrochlorothiazide (ZESTORETIC) 20-12.5 MG tablet Take 2 tablets by mouth daily 9/2/2021 at am Yes Unknown, Entered By History   metFORMIN (GLUCOPHAGE) 500 MG tablet Take 500 mg by mouth 2 times daily (with meals) 9/2/2021 at am Yes Unknown, Entered By History   metoprolol tartrate (LOPRESSOR) 100 MG tablet Take 100 mg by mouth 2 times daily 9/2/2021 at am Yes Unknown, Entered By History

## 2021-09-03 NOTE — PROGRESS NOTES
PRIMARY DIAGNOSIS: CHEST PAIN  OUTPATIENT/OBSERVATION GOALS TO BE MET BEFORE DISCHARGE:  1. Ruled out acute coronary syndrome (negative or stable Troponin):  Yes  2. Pain Status: Improved-controlled with oral pain medications.  3. Appropriate provocative testing performed: Yes  - Stress Test Procedure: Echo  - Interpretation of cardiac rhythm per telemetry tech: SR    4. Cleared by Consultants (if applicable):Yes  5. Return to near baseline physical activity: No  Discharge Planner Nurse   Safe discharge environment identified: No  Barriers to discharge: Yes       Entered by: Saba De La Cruz 09/03/2021 6:43 PM     Please review provider order for any additional goals.   Nurse to notify provider when observation goals have been met and patient is ready for discharge.    Assist x2 with monique martinez  Potassium replaced, recheck ordered for AM  BG checks, elevated  Needs UA- new purewick set up for collection  New metoprolol dose started  Knee wrapped with ace wrap per Ortho  KI at bedside for when OOB  Continue with plan of care

## 2021-09-03 NOTE — PROGRESS NOTES
PRIMARY DIAGNOSIS: CHEST PAIN  OUTPATIENT/OBSERVATION GOALS TO BE MET BEFORE DISCHARGE:  1. Ruled out acute coronary syndrome (negative or stable Troponin):  stable,still trending lab  2. Pain Status: Improved-controlled with oral pain medications- has not required since this AM, given tylenol, flexeril, tums  3. Appropriate provocative testing performed: Yes  - Stress Test Procedure: Echo  - Interpretation of cardiac rhythm per telemetry tech: SR    4. Cleared by Consultants (if applicable):No  5. Return to near baseline physical activity: No  Discharge Planner Nurse   Safe discharge environment identified: No  Barriers to discharge: Yes       Entered by: Saba De La Cruz 09/03/2021 3:06 PM     Please review provider order for any additional goals.   Nurse to notify provider when observation goals have been met and patient is ready for discharge.  Pt moving assist x2 with monique martinez, up in chair, PT consulted  Pain in right knee- waiting for ortho input, CT scan completed  Tolerating diet, denies nausea  Chest pain improving, on tele

## 2021-09-03 NOTE — PROGRESS NOTES
[facility]  Hospitalist Progress Note  Rosetta Noble PA-C 09/03/2021    Reason for Stay (Diagnosis): right knee pain          Assessment and Plan:      Ruthy Becerril is a 80 year old female with a past medical history of COPD, diabetes mellitus, hypertension, hyperlipidemia and obesity who presents with right-sided knee pain.   Pt had interval complaints of substernal heaviness morning of 9/3.  GI cocktail was given that improved her symptoms.  However EKG did show new onset A. fib RVR with heart rates in the 140s.    #New onset A. fib RVR-she just self converted this afternoon  --Speaking with patient, she states that she has had previous episodes of intermittent palpitations so I suspect she might have paroxysmal atrial fibrillation  --Mildly hypokalemic but this has been corrected.  Mag looks okay as well as TSH no obvious source of infection troponins negative  --Echocardiogram performed today shows evidence of hyperdynamic left ventricle with apical hypokinesis  --Plan for cardiology evaluation  --Continue monitor on telemetry for now  --Continue PTA beta-blocker with 100 mg twice daily  --May need to discuss oral anticoagulation ([id number] score of 5, 7.2% of lifetime stroke potential)   --Hold anticoagulation for now in case entire procedure might be indicated for right knee pain  --We will preemptively have pharmacy check Xarelto Eliquis for anticoagulation    Hosp addendum  Discussed with Dr. Willams from cardiology.  He will be starting her on amiodarone.  In terms of anticoagulation she has opted for warfarin.  Will start pharmacy consult for dosing.  Likely will need Zio patch to monitor for bouts of intermittent A. fib.  Unfortunately unable to get over the weekend.  She will need to have this arranged as an outpatient.     #Right knee pain secondary to baker's cyst and degenerative changes:  going down the stairs about 4 days ago when she reached the final step, put weight on her  right leg and felt like it gave out. She did not fall or lose her balance.  She lives at home with her son and grandchildren who have helped her anytime she needs to move by supporting her weight.   --X-ray of the knee showed degenerative changes with no evidence of fracture.  X-ray of the hip and pelvis showed severe end-stage degenerative changes involving the left hip, postoperative changes of the right total hip arthroplasty with components well seated, but no evidence of fracture. -- Ultrasound of the lower extremity was negative for DVT but showed 4.8 cm Baker's cyst.  --Not really sure why she is having such significant pain on her right knee, she did not have this much pain with a Baker's cyst  --Does have suprapatellar effusion, but not convincing for intra-articular infection   --she denies any history of gout  --Await Ortho consult, they did order CT scan of the right knee  --Continue supportive care with scheduled tylenol, flexeril and PRN tramadol  -PT consulted.     Hosp addendum:  Discussed with orthopedic, appreciate consult.  Will add on to pain control in addition Ace wrap and knee immobilizer for support.  Will likely need outpatient orthopedic follow-up.  PT eval tomorrow, suspect will need TCU especially in light that patient has multiple stairs to get to at home.    #SALEEM: Baseline Cr around 0.8.  Creatinine on presentation in 1.17.    --Improved after IVF.      #Leukocytosis: Suspect stress reaction.  No fever.  No evidence of infection.   --resolved today      Chronic Medical Conditions  #DM2: Pt is chronically on 50 units Lantus qPM, glipizide 10 mg BID, metformin 500 mg BID.   --Resume PTA regiment and accu-checks with sliding scale insulin for now.    #HTN: Currently takes Metoprolol, Norvasc, HCTZ- Lisinopril.    --All resumed with stable BP    #HL: resume lipitor    #COPD: Not in acute exacerbation.  Continue albuterol as needed.  Recommend continue aggressive I-S while patient is still  "here    #Obesity: Complicates cares     DVT Prophylaxis: Pneumatic Compression Devices  Code Status: DNR/DNI.  Pt is adament she would not want to be on ventilator and would not want heroic measures.   Dispo: possible home in the next day or 2 vs TCU    Hospitalist addendum  1) Was able to reach patient's daughter-in-law, she states the patient has had multiple issues in the past and she hides it from her doctors. Apparently patient has had episodes of chest pain that she did not want to tell the doctor.    2) Pt recently had a colonoscopy in [place] (due to concerns for hematochezia) (report not available)  Apparently she was told that she needed to come back for a follow-up because there was concern that she might have colon cancer.  Patient had refused and did not want to follow-up.  Daughter-in-law wanted to know if we can do anything about this however at this current time I do not have her colonoscopy report.  She not having any episodes of hematochezia.  I encouraged her to follow-up with her primary care provider and gastroenterology in regards with her colonoscopy findings especially in light of warfarin initiation  3) daughter-in-law states that patient has had increased urinary incontinence.  Will check UA to rule out infection  -Symptoms might be related to urinary urgency/ incontinence that will need outpatient follow-up.         Interval History (Subjective):      Tearful, fearful abt putting weight on the right knee.   C/o substernal chest discomfort this am. Found to be in afib rvr. No complaints of chest pain no SOB no n/v. Denies any hx of arrhthymias.                   Physical Exam:      Last Vital Signs:  /60 (BP Location: Right arm)   Pulse 69   Temp 97.7  F (36.5  C) (Oral)   Resp 18   Ht 1.676 m (5' 6\")   Wt 86 kg (189 lb 9.6 oz)   SpO2 93%   BMI 30.60 kg/m        Constitutional: Awake, alert, cooperative, mild distress   Respiratory: Clear to auscultation bilaterally, no " crackles or wheezing   Cardiovascular: irreg irreg, normal S1 and S2, and no murmur noted   Abdomen: Normal bowel sounds, soft, non-distended, non-tender   Skin: No rashes, no cyanosis, dry to touch   Neuro: Alert and oriented x3, no weakness, numbness, memory loss   Extremities: Mild prepatellar effusion on the right, pain over the lateral posterior knee, able to raise leg but with some pain. Bending knee is ok to around 90   Other(s):        All other systems: Negative          Medications:      All current medications were reviewed with changes reflected in problem list.         Data:      All new lab and imaging data was reviewed.   Labs:       Lab Results   Component Value Date     09/03/2021     09/02/2021    Lab Results   Component Value Date    CHLORIDE 107 09/03/2021    CHLORIDE 107 09/02/2021    Lab Results   Component Value Date    BUN 42 09/03/2021    BUN 40 09/02/2021      Lab Results   Component Value Date    POTASSIUM 3.8 09/03/2021    POTASSIUM 3.3 09/03/2021    POTASSIUM 3.7 09/02/2021    Lab Results   Component Value Date    CO2 28 09/03/2021    CO2 25 09/02/2021    Lab Results   Component Value Date    CR 1.02 09/03/2021    CR 1.17 09/02/2021        Recent Labs   Lab 09/03/21  0532 09/02/21  2055   WBC 10.1 14.9*   HGB 12.7 13.6   HCT 39.8 41.3   MCV 88 88    335     No results for input(s): SED, CRP in the last 168 hours.  Recent Labs   Lab 09/03/21  1157 09/03/21  0737 09/03/21  0532 09/03/21  0420 09/03/21  0042   * 194* 272* 270* 291*     Recent Labs   Lab 09/03/21  1100   TROPONIN 0.028     Recent Labs   Lab 09/03/21  0532   TSH 1.20     Imaging:   Results for orders placed or performed during the hospital encounter of 09/02/21   XR Pelvis w Hip Right G/E 2 Views    Narrative    EXAM: XR PELVIS AND HIP RIGHT 2 VIEWS  LOCATION: [facility]  DATE/TIME: 9/2/2021 6:59 PM    INDICATION: Right hip pain.  COMPARISON: None.      Impression     IMPRESSION: Postoperative changes of right total hip arthroplasty. Components appear well-seated. 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 There is trabecular lucency traversing the left hip and dedicated views recommended to exclude   fracture. Pelvis negative for fracture. Vascular calcifications.   XR Knee Right 1/2 Views    Narrative    EXAM: XR KNEE RT 1 /2 VW  LOCATION: [facility]  DATE/TIME: 9/2/2021 6:59 PM    INDICATION: Right knee pain.  COMPARISON: None.      Impression    IMPRESSION: Degenerative change within all 3 compartments of the right knee. No evidence for acute fracture. No significant effusion. Osteophytic spurring about the patella and lateral joint line. Chronic enthesitis at the extensor tendon attachments.   Vascular calcifications.   US Lower Extremity Venous Duplex Right    Narrative    EXAM: US LOWER EXTREMITY VENOUS DUPLEX RIGHT  LOCATION: [facility]  DATE/TIME: 9/2/2021 9:46 PM    INDICATION: Right posterior knee pain, concern for Baker's Cyst vs DVT  COMPARISON: None.  TECHNIQUE: Venous Duplex ultrasound of the right lower extremity with and without compression, augmentation and duplex. Color flow and spectral Doppler with waveform analysis performed.    FINDINGS: Exam includes the common femoral, femoral, popliteal, and contralateral common femoral veins as well as segmentally visualized deep calf veins and greater saphenous vein.     RIGHT: No deep vein thrombosis. No superficial thrombophlebitis. There is a right popliteal fossa Baker's cyst measuring 4.8 x 2.3 x 1.1 cm.      Impression    IMPRESSION:  1.  No deep venous thrombosis in the right lower extremity.    2.  4.8 cm Baker's cyst.   CT Knee Right w/o Contrast    Narrative    CT KNEE RIGHT WITHOUT CONTRAST 9/3/2021 2:20 PM     HISTORY: Knee trauma, meniscal/ligament injury suspected, x-ray done  (Age >= 1y).    COMPARISON: 9/2/2021 radiographs    Radiation dose  for this scan was reduced using automated exposure  control, adjustment of the mA and/or kV according to patient size, or  iterative reconstruction technique.    FINDINGS:  Degenerative arthrosis is noted. This is associated with  moderate to severe patellofemoral lateral facet joint space narrowing  and lateral compartment joint space narrowing. Lateral femoral condyle  medial spurring and hypertrophic change of the tibial spine results in  bone contacting bone. There is a large fragmented spur or articular  body measuring approximately 1.8 x 0.8 cm lateral to the patella  within the lateral joint recess. There are probable small articular  bodies within the popliteal tendon sheath. Articular body is also  noted posterior to the cruciate ligaments. Suprapatellar recess joint  effusion and mild to moderate popliteal cyst are noted. No evidence of  acute fracture. Meniscal chondrocalcinosis is noted.      Impression    IMPRESSION:  1. Advanced osteoarthropathy, greatest in the lateral and  patellofemoral compartments. Multiple articular bodies are noted.  Joint effusion/popliteal cyst is noted.  2. No evidence of acute fracture.    LYDIA WILDER MD         SYSTEM ID:  [id number]   Echocardiogram Complete     Value    LVEF  75%    Narrative    [id number]  [facility]  [id number]  [id number]^LEATHA^ARNAUD^IRENE     [facility]  Echocardiography Laboratory  [address] [place] [address]     Name: DAKOTA BANKS  MRN: [id number]  : 1940  Study Date: 2021 11:25 AM  Age: 80 yrs  Gender: Female  Patient Location: [facility]  Reason For Study: Atrial Fibrillation  Ordering Physician: ARNAUD ZHANG  Performed By: Adela Wright RDCS     BSA: 2.0 m2  Height: 66 in  Weight: 189 lb  BP: 133/57 mmHg  ______________________________________________________________________________  Procedure  Complete Portable Echo Adult. [employer]ison (NDC #[id number]) given  intravenously.  ______________________________________________________________________________  Interpretation Summary     The visual ejection fraction is estimated at 75%.  At rest the peak intracavitatory gradient is 78.4 mmHg.  Mildly decreased right ventricular systolic function  Valvular aortic stenosis can not be excluded in this study as the dynamic LVOT  gradient masks the aortic valve gradient and the aortic valve is not well seen  on this study.  The ascending aorta is Borderline dilated.  The study was technically difficult.  ______________________________________________________________________________  Left Ventricle  The left ventricle is normal in size. There is borderline eccentric left  ventricular hypertrophy. The visual ejection fraction is estimated at 75%.  Diastolic Doppler findings (E/E' ratio and/or other parameters) suggest left  ventricular filling pressures are indeterminate. A mid-cavitary gradient is  present. At rest the peak intracavitatory gradient is 78.4 mmHg. There is  severe apical wall hypokinesis.     Right Ventricle  The right ventricle is normal size. Mildly decreased right ventricular  systolic function.     Atria  Normal left atrial size. Right atrial size is normal. There is no color  Doppler evidence of an atrial shunt.     Mitral Valve  There is mild mitral annular calcification. No systolic anterior motion of the  mitral valve. There is trace mitral regurgitation.     Tricuspid Valve  There is trace tricuspid regurgitation. The right ventricular systolic  pressure is approximated at 25.6 mmHg plus the right atrial pressure.     Aortic Valve  The aortic valve is not well visualized. Thickened aortic valve leaflets. No  aortic regurgitation is present. Valvular aortic stenosis can not be excluded  in this study as the dynamic LVOT gradient masks the aortic valve gradient and  the aortic valve is not well seen on this study.     Pulmonic Valve  The pulmonic valve is not  well visualized. There is no pulmonic valvular  regurgitation.     Vessels  The aortic root is normal size. The ascending aorta is Borderline dilated. IVC  diameter <2.1 cm collapsing >50% with sniff suggests a normal RA pressure of 3  mmHg.     Pericardium  There is no pericardial effusion.     Rhythm  The rhythm was atrial flutter.  ______________________________________________________________________________  MMode/2D Measurements & Calculations  IVSd: 1.2 cm  LVIDd: 3.3 cm  LVIDs: 1.6 cm  LVPWd: 0.98 cm  FS: 50.7 %  LV mass(C)d: 105.9 grams  LV mass(C)dI: 54.2 grams/m2  Ao root diam: 3.5 cm  LA dimension: 2.8 cm  asc Aorta Diam: 3.4 cm  LA/Ao: 0.79  LVOT diam: 2.2 cm  LVOT area: 3.6 cm2  LA Volume (BP): 36.7 ml  LA Volume Index (BP): 18.8 ml/m2     RWT: 0.60     Doppler Measurements & Calculations  MV E max negrita: 114.5 cm/sec  MV max P.9 mmHg  MV mean PG: 3.9 mmHg  MV V2 VTI: 16.8 cm  MV dec time: 0.16 sec  TR max negrita: 251.6 cm/sec  TR max P.6 mmHg  E/E' av.6  Lateral E/e': 11.1  Medial E/e': 12.1     ______________________________________________________________________________  Report approved by: Yumiko Dumont 2021 12:57 PM

## 2021-09-03 NOTE — ED NOTES
Pt unable to stand for ambulation test with walker due to pain behind their R knee. Dr Nagy informed.

## 2021-09-04 NOTE — CONSULTS
Care Management Initial Consult    General Information  Assessment completed with: Patient,    Type of CM/SW Visit: Initial Assessment    Primary Care Provider verified and updated as needed:     Readmission within the last 30 days:        Reason for Consult: discharge planning  Advance Care Planning:            Communication Assessment  Patient's communication style: spoken language (English or Bilingual)    Hearing Difficulty or Deaf: no   Wear Glasses or Blind: no    Cognitive  Cognitive/Neuro/Behavioral: WDL                      Living Environment:   People in home: alone     Current living Arrangements: house      Able to return to prior arrangements: no       Family/Social Support:  Care provided by: self  Provides care for: no one     Children          Description of Support System: Supportive    Support Assessment: Adequate family and caregiver support, Adequate social supports    Current Resources:   Patient receiving home care services: No     Community Resources: None  Equipment currently used at home: walker, rolling  Supplies currently used at home:      Employment/Financial:  Employment Status:          Financial Concerns: insurance, none           Lifestyle & Psychosocial Needs:  Social Determinants of Health     Tobacco Use:      Smoking Tobacco Use:      Smokeless Tobacco Use:    Alcohol Use:      Frequency of Alcohol Consumption:      Average Number of Drinks:      Frequency of Binge Drinking:    Financial Resource Strain:      Difficulty of Paying Living Expenses:    Food Insecurity:      Worried About Running Out of Food in the Last Year:      Ran Out of Food in the Last Year:    Transportation Needs:      Lack of Transportation (Medical):      Lack of Transportation (Non-Medical):    Physical Activity:      Days of Exercise per Week:      Minutes of Exercise per Session:    Stress:      Feeling of Stress :    Social Connections:      Frequency of Communication with Friends and Family:       Frequency of Social Gatherings with Friends and Family:      Attends Congregation Services:      Active Member of Clubs or Organizations:      Attends Club or Organization Meetings:      Marital Status:    Intimate Partner Violence:      Fear of Current or Ex-Partner:      Emotionally Abused:      Physically Abused:      Sexually Abused:    Depression:      PHQ-2 Score:    Housing Stability:      Unable to Pay for Housing in the Last Year:      Number of Places Lived in the Last Year:      Unstable Housing in the Last Year:        Functional Status:  Prior to admission patient needed assistance: Patient was indpendent prior to admission.         Mental Health Status:  No issues          Chemical Dependency Status:  No issues             Values/Beliefs:  Spiritual, Cultural Beliefs, Congregation Practices, Values that affect care: unknown                Additional Information:  SW met with patient at bedside for assessment of discharge planning needs. Patient is alert and oriented X3, conversant, and able to participate in discharge planning. Met with patient to discuss need for skilled nursing facility at discharge. The patient was given a list of skilled nursing facilities which includes the medicare.gov website for a comprehensive list of skilled nursing facilities.     Pt/family was given the Medicare Compare list for SNF, with associated star ratings to assist with choice for referrals/discharge planning Yes    Education was given to pt/family that star ratings are updated/maintained by Medicare and can be reviewed by visiting www.medicare.gov Yes    Patient is not COVID vaccinated.  At this point it will be a challenge to find a facility that can take her. SW will send referrals.       SHINE Lobo         md did not obtain cord for RN

## 2021-09-04 NOTE — UTILIZATION REVIEW
"[facility]   Admission Status; Secondary Review Determination     Admission Date: 9/2/2021  5:58 PM      Under the authority of the Utilization Management Committee, the utilization review process indicated a secondary review on the above patient.  The review outcome is based on review of the medical records, discussions with staff, and applying clinical experience noted on the date of the review.        (X)      Inpatient Status Appropriate - This patient's medical care is consistent with medical management for inpatient care and reasonable inpatient medical practice.      () Observation Status Appropriate - This patient does not meet hospital inpatient criteria and is placed in observation status. If this patient's primary payer is Medicare and was admitted as an inpatient, Condition Code 44 should be used and patient status changed to \"observation\".   () Admission Status NOT Appropriate - This patient's medical care is not consistent with medical management for Inpatient or Observation Status.          RATIONALE FOR DETERMINATION   79 yo female with HTN, COPD, DM II  and obesity who presented with right knee pain x4 days. Labs with elevated Cr in setting of NSAID use and some dehydration. She had substernal heaviness on 9/3 and ECG with new onset a-fib with RVR with HR 140s. She self converted to NSR. Cardiology consulted and has decreased PTA metoprolol and started amlodipine and warfarin. Orthopedic surgery consulted for the knee pain and recommends conservative management with topical analgesia and PT/OT. Today, she continues to have sharp 5/10 knee pain and is an assist of two for ambulation. On admission she was registered to observation, but yesterday was appropriately advanced to inpatient give above. Additionally, the patient has required overnight hospital based care and is anticipated to require one or more additional midnights of hospital care, based on CMS guidelines, the patient should " be admitted as an inpatient.     The severity of illness, intensity of service provided, expected LOS and risk for adverse outcome make the care complex, high risk and appropriate for hospital admission.    The information on this document is developed by the utilization review team in order for the business office to ensure compliance.  This only denotes the appropriateness of proper admission status and does not reflect the quality of care rendered.         The definitions of Inpatient Status and Observation Status used in making the determination above are those provided in the CMS Coverage Manual, Chapter 1 and Chapter 6, section 70.4.      Sincerely,     Angie Reynaga MD MPH  Utilization Review  [facility].

## 2021-09-04 NOTE — PROVIDER NOTIFICATION
Tele tech called writer that patient's HR was in 120s and seemed to be A fib. Writer released conditional EKG order. Tele tech called back to say it looked like V Tach instead, showed intermittent P waves. EKG was completed, showed sinus tachycardia with 1st degree AVB. MD made aware, shown EKG strip and reviewed tele monitor as it still looked like A Fib on that monitor. Patient then developed chest pain, PRN nitroglycerin ordered. Given one dose with good relief. SOB also reported during event, supplemental O2 applied for comfort.

## 2021-09-04 NOTE — PLAN OF CARE
Pt is AxOx4 and able to make needs known. Currently ambulating Ax2 w/ monique steady (WBAT) due to weakness. Tolerating a diabetic diet with stable blood glucose. Rating right knee pain a 5/10 constant sharp pain, scheduled pain meds and Voltaren gel administered with moderate relief. Clarisse cares, PW change, and skin barrier applied. Ace wrap applied to right knee, PCDs in place while in bed. Pt is calm and cooperative with cares, and currently resting peacefully. Plan to monitor pain and mobility along with tele and follow up with ortho and cardio outpatient. Pt agreeable to plan of care. Will continue to provide supportive cares.

## 2021-09-04 NOTE — PLAN OF CARE
Patient alert and oriented  Peripheral IV saline locked  VSS, on room air- 2L this afternoon for SOB during chest pain and rhythm change episode  Tolerating diet, denies nausea  BGs improved from yesterday  Diminished lung sounds, insp, wheezes  Redness to butt, groin, abdomen, breasts, cleansed with perineal spray and powder applied  Up assist x1 with monique martinez  Potassium recheck ordered for AM per protocol  Trending trops, nitroglycerine tabs at bedside  Tele: SR last reported  Continue with plan of care

## 2021-09-04 NOTE — PROGRESS NOTES
[facility]    Hospitalist Progress Note    Date of Service (when I saw the patient): 09/04/2021  Provider:  Esau Scanlon MD   Text Page  7am - 6PM       Assessment & Plan   Ruthy Becerril is a 80 year old female with a past medical history of COPD, diabetes mellitus, hypertension, hyperlipidemia and obesity who presents with right-sided knee pain.   Pt had interval complaints of substernal heaviness morning of 9/3.  GI cocktail was given that improved her symptoms.  However EKG did show new onset A. fib RVR with heart rates in the 140s.     #New onset A. fib RVR- self converted   --History of previous episodes of intermittent palpitations, suspect she might have paroxysmal atrial fibrillation  --Echocardiogram performed today shows evidence of hyperdynamic left ventricle with apical hypokinesis  --Cardiology consulted, input appreciated  --Continue monitor on telemetry  --Continue PTA beta-blocker with 100 mg twice daily  --Oral anticoagulation ([id number] score of 5, 7.2% of lifetime stroke potential) with warfarin  --No procedure for right knee pain in the next few weeks     Hosp addendum  Discussed with Dr. Willams from cardiology.  He started her on amiodarone.  In terms of anticoagulation she has opted for warfarin.    Likely will need Zio patch to monitor for bouts of intermittent A. fib.       #Right knee pain secondary to baker's cyst and degenerative changes:  going down the stairs about 4 days ago when she reached the final step, put weight on her right leg and felt like it gave out. She did not fall or lose her balance.  She lives at home with her son and grandchildren who have helped her anytime she needs to move by supporting her weight.   --X-ray of the knee showed degenerative changes with no evidence of fracture.  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   -- Ultrasound of the lower extremity was negative for DVT but showed 4.8 cm Baker's cyst.  -- Ortho consulted,  CT scan of the right knee shows severe osteoarthritis and floating bodies.  --Continue supportive/conservative care with scheduled tylenol, flexeril and PRN tramadol. Ace wrap and knee immobilizer for support.  Will likely need outpatient orthopedic  --PT consulted.      #SALEEM: Baseline Cr around 0.8.  Creatinine on presentation in 1.17.    --Improved after IVF.      #Leukocytosis: Suspect stress reaction.  No fever.  No evidence of infection.   --resolved today      Chronic Medical Conditions  #DM2: Pt is chronically on 50 units Lantus qPM, glipizide 10 mg BID, metformin 500 mg BID.   --Resume PTA regiment and accu-checks with sliding scale insulin for now.     #HTN: Currently takes Metoprolol, Norvasc, HCTZ- Lisinopril.    --All resumed with stable BP     #HL: resume lipitor     #COPD: Not in acute exacerbation.  Continue albuterol as needed.  Recommend continue aggressive I-S while patient is still here     #Obesity: Complicates cares     DVT Prophylaxis: Pneumatic Compression Devices.  Warfarin  Code Status: DNR/DNI.  Pt is adament she would not want to be on ventilator and would not want heroic measures.   Dispo: TCU but patient does not have Covid immunization which will need 3 days or more before she is accepted according to     Interval History   She has been better today until the afternoon when she had an episode of atrial fibrillation with RVR, at some point she complained about chest pain and it subsided with 1 dose of nitroglycerin sublingual.  She converted spontaneously to normal sinus rhythm.  Serial troponin has been ordered.    -Data reviewed today: I reviewed all new labs and imaging results over the last 24 hours. I personally reviewed the EKG tracing showing Atrial fibrillation with RVR noticed in the  monitor.    Physical Exam   Temp: 97.5  F (36.4  C) Temp src: Oral BP: 122/68 Pulse: 74   Resp: 24 SpO2: 94 % O2 Device: Nasal cannula Oxygen Delivery: 2 LPM  Vitals:    09/02/21 1805 09/03/21 0018   Weight: 86.2 kg (190 lb) 86 kg (189 lb 9.6 oz)     Vital Signs with Ranges  Temp:  [97.5  F (36.4  C)-97.9  F (36.6  C)] 97.5  F (36.4  C)  Pulse:  [] 74  Resp:  [16-24] 24  BP: ()/(54-77) 122/68  SpO2:  [91 %-94 %] 94 %  I/O last 3 completed shifts:  In: -   Out: 1350 [Urine:1350]    GEN:  Alert, oriented x 3, appears comfortable, NAD.  HEENT:  Normocephalic/atraumatic, no scleral icterus, no nasal discharge, mouth moist.  CV:  Regular rate and rhythm, no murmur or JVD.  S1 + S2 noted, no S3 or S4.  LUNGS:  Clear to auscultation bilaterally without rales/rhonchi/wheezing/retractions.  Symmetric chest rise on inhalation noted.  ABD:  Active bowel sounds, soft, non-tender/non-distended.  No rebound/guarding/rigidity.  EXT:  No edema or cyanosis.  No joint synovitis noted.  SKIN:  Dry to touch, no exanthems noted in the visualized areas.       Medications     Reason anticoagulant not prescribed for atrial fibrillation       Warfarin Therapy Reminder         acetaminophen  975 mg Oral TID     amiodarone  200 mg Oral TID     [START ON 9/18/2021] amiodarone  200 mg Oral Daily     amLODIPine  10 mg Oral Daily     atorvastatin  40 mg Oral At Bedtime     cyclobenzaprine  10 mg Oral TID     diclofenac  2 g Topical 4x Daily     glipiZIDE  10 mg Oral BID AC     insulin aspart  1-7 Units Subcutaneous TID AC     insulin aspart  1-5 Units Subcutaneous At Bedtime     insulin degludec  50 Units Subcutaneous At Bedtime     lisinopril-hydrochlorothiazide  2 tablet Oral Daily     metFORMIN  500 mg Oral BID w/meals     metoprolol succinate ER  50 mg Oral Daily     warfarin ANTICOAGULANT  5 mg Oral ONCE at 18:00       Data   Recent Labs   Lab 09/04/21  1525 09/04/21  1246 09/04/21  0904 09/04/21  0523 09/03/21  1800  09/03/21  1755 09/03/21  1514 09/03/21  1100 09/03/21  0737 09/03/21  0532 09/02/21 2055   WBC  --   --   --   --   --   --   --   --   --  10.1 14.9*   HGB  --   --   --   --   --   --   --   --   --  12.7 13.6   MCV  --   --   --   --   --   --   --   --   --  88 88   PLT  --   --   --   --   --   --   --   --   --  309 335   INR  --   --   --  0.97  --  1.00  --   --   --   --   --    NA  --   --   --  139  --   --   --   --   --  139 140   POTASSIUM  --   --   --  3.9  --   --   --  3.8  --  3.3* 3.7   CHLORIDE  --   --   --  109  --   --   --   --   --  107 107   CO2  --   --   --  29  --   --   --   --   --  28 25   BUN  --   --   --  32*  --   --   --   --   --  42* 40*   CR  --   --   --  0.57  --   --   --   --   --  1.02 1.17*   ANIONGAP  --   --   --  1*  --   --   --   --   --  4 8   NATACHA  --   --   --  9.6  --   --   --   --   --  9.5 9.8   GLC  --  183* 142* 209*  --   --   --   --   --  272* 200*   ALBUMIN  --   --   --   --   --   --  2.9*  --   --   --   --    PROTTOTAL  --   --   --   --   --   --  6.6*  --   --   --   --    BILITOTAL  --   --   --   --   --   --  0.2  --   --   --   --    ALKPHOS  --   --   --   --   --   --  100  --   --   --   --    ALT  --   --   --   --   --   --  24  --   --   --   --    AST  --   --   --   --   --   --  16  --   --   --   --    TROPONIN <0.015  --   --   --  <0.015  --  0.016 0.028   < >  --   --     < > = values in this interval not displayed.       No results found for this or any previous visit (from the past 24 hour(s)).      Disclaimer: This note consists of symbols derived from keyboarding, dictation and/or voice recognition software. As a result, there may be errors in the script that have gone undetected. Please consider this when interpreting information found in this chart.

## 2021-09-04 NOTE — PROGRESS NOTES
Orthopedic Surgery  Ruthy Becerril  2021  Admit Date:  2021  POD * No surgery found *  S/P     Patient states right knee pain is slightly improved but still there.  Discussed follow up as outpatient for continued knee cares, patient expressed agreement. Tolerating oral intake.  No events overnight.     Alert and orient to person, place, and time.  Vital Sign Ranges  Temperature Temp  Av.8  F (36.6  C)  Min: 97.5  F (36.4  C)  Max: 98.2  F (36.8  C)   Blood pressure Systolic (24hrs), Av , Min:143 , Max:148        Diastolic (24hrs), Av, Min:61, Max:72      Pulse Pulse  Av.8  Min: 67  Max: 125   Respirations Resp  Av.3  Min: 16  Max: 20   Pulse oximetry SpO2  Av.8 %  Min: 91 %  Max: 93 %       Right knee wrapped in an ACE  No notable swelling or edema  Mild suprapatellar effusion  Full ROM of right knee - 0 to 90 degrees, limited by position in bed  Stable to varus and valgus stress, mild opening with valgus  Negative anterior drawer  No TTP over medial or lateral joint line  Able to SLR with pain in medial aspect of superior knee  Bilateral calves are soft, non-tender.  Bilateral lower extremity is NVI.  Sensation intact bilateral lower extremities  5/5 motor with resisted dorsi and plantar flexion bilaterally  5/5 EHL  +Dp pulse    Labs:  Recent Labs   Lab Test 21  0523 21  1100 21  0532   POTASSIUM 3.9 3.8 3.3*     Recent Labs   Lab Test 21  0532 21   HGB 12.7 13.6     Recent Labs   Lab Test 21  0523 21  1755   INR 0.97 1.00     Recent Labs   Lab Test 21  0532 21    335       A/P  1. Right knee pain   Conservative treatment at this time   Voltaren gel   Ice   KI when mobilizing for short time   PT/OT   WBAT   Follow-up with [facility] Dr. Cooper if pain persists (If cortisone given 2-3 months must pass prior to TKA)    2. Disposition   Anticipate d/c pending therapy progress    Kjerstin L Foss, PA-C

## 2021-09-04 NOTE — PHARMACY-ANTICOAGULATION SERVICE
Clinical Pharmacy - Warfarin Dosing Consult     Pharmacy has been consulted to manage this patient s warfarin therapy.  Indication: Atrial Fibrillation  Therapy Goal: INR 2-3  Warfarin Prior to Admission: No  Significant drug interactions: Amiodarone  Dose Comments: 5mg tonight    INR   Date Value Ref Range Status   09/03/2021 1.00 0.85 - 1.15 Final     Comment:     Effective 7/11/2021, the reference range for this assay has changed.      Pharmacy will monitor Ruthy Becerril daily and order warfarin doses to achieve specified goal.      Please contact pharmacy as soon as possible if the warfarin needs to be held for a procedure or if the warfarin goals change.

## 2021-09-05 NOTE — PLAN OF CARE
"BP (!) 149/69 (BP Location: Right arm)   Pulse 71   Temp 97.7  F (36.5  C) (Oral)   Resp 18   Ht 1.676 m (5' 6\")   Wt 86 kg (189 lb 9.6 oz)   SpO2 91%   BMI 30.60 kg/m    VSS. Pt is AxOx4 and able to make needs known. Ambulating Ax1 w/ monique steady. Tolerating a 60g Ro diet. PW in place and voiding w/o difficulty, no stools this shift. Rating left knee pain 4/10, scheduled flexeril and voltaran gel administered with moderate relief. On tele running SR 60s. Miconazole powder applied to reddened areas under breasts, under panus, and groin. Ace wrap applied to left knee, PCDs in place. Plan to monitor tele, manage pain, and work with PT to improve mobility. Awaiting TCU placement. Will continue to provide supportive cares.  "

## 2021-09-05 NOTE — PROGRESS NOTES
"Telemetry/Cardiac Event Update from [id number]    Pt was getting up with Angie-Steady in room (2 NSTs present) when she stated she felt \"a little dizzy\" but still wanted to get up to commode - bedpan was offered. HR and BP elevated (see flowsheet) - telemetry tech called to notify HR in 140s - tele strip sent for review and placed in chart. Pt was straining on commode for BM - HR elevated to an SVT event in 190s - pt dizzy but no cognitive dysfunction. Pt placed back in bed with RN in room for monitoring. Morning cardiac medications (amiodarone and metoprolol given - see MAR) - SVT resolved to -134 with visible P waves.     Dr. Scanlon made aware both during event and rounds, continued monitoring. No further tele events to note at this time.    Benoit Rob RN on 9/5/2021 at 10:55 AM    "

## 2021-09-05 NOTE — PLAN OF CARE
"INPATIENT NOTE [id number]    Admit Date: 9/2/21  Admitting Diagnosis: Rt. KNEE BAKER'S CYST; NEW ONSET A.FIB  Pertinent History: HTN, HLD, DM2, COPD, SALEEM    Pt alert and oriented, assist x2 with walker or monique steady - pt prefers walker, encouraging up to commode. Pt has redness/rash at breasts, pannus, groin - using Micatin powder. BG checks 92 and 116 - no insulin given. Pt has rt knee ace wrapped and immobilizer in the room to use as needed and w/ ambulation. Continuous cardiac monitoring, pain management, and supportive cares.    Isolation Precautions:   n/a.    Neuro: Alert and Oriented x4  Activity: are 2 assist with Walker and Monique Steady (either one, pt prefers walker)  Telemetry Monitoring: Yes - tachycardic, irregular and see progress note - resolved to SR 60s-100s variable  Pain: complaining of 4/10 pain in their right knee. Tylenol, Ultram and topical Voltaren gel given for pain.  Labs / Tests: trops negative, INR 1.18 (therapeutic range)  GI: pt has not had a bowel movement in 3 days, states minimal abdominal discomfort but may need to request senna or miralax  : encouraging pt to ambulate to commode for the day, will ask handoff RN to reasses need for purewick placement this evening  Medications: see MAR  LDA's: Peripheral  Fluids: is Saline locked.  Diet: Diabetic  Living Situation:   Consults: PT and Cardiology was consulted - see note  Discharge Disposition: Transitional Care Unit    BP (!) 157/75 (BP Location: Right arm)   Pulse 114   Temp 97.7  F (36.5  C) (Oral)   Resp 18   Ht 1.676 m (5' 6\")   Wt 86 kg (189 lb 9.6 oz)   SpO2 96%   BMI 30.60 kg/m      Benoit Rob RN on 9/5/2021  "

## 2021-09-05 NOTE — PROGRESS NOTES
[facility]    Hospitalist Progress Note    Date of Service (when I saw the patient): 09/05/2021  Provider:  Esau Scanlon MD   Text Page  7am - 6PM       Assessment & Plan   Rtuhy Becerril is a 80 year old female with a past medical history of COPD, diabetes mellitus, hypertension, hyperlipidemia and obesity who presents with right-sided knee pain.   Pt had interval complaints of substernal heaviness morning of 9/3.  GI cocktail was given that improved her symptoms.  However EKG did show new onset A. fib RVR with heart rates in the 140s.     #New onset A. fib RVR- self converted   --History of previous episodes of intermittent palpitations, suspect she might have paroxysmal atrial fibrillation  --Echocardiogram performed today shows evidence of hyperdynamic left ventricle with apical hypokinesis  --Cardiology consulted, input appreciated  --Continue monitor on telemetry  --Continue PTA beta-blocker with 100 mg twice daily.Amiodarone 200 mg 1 tab daily.   --Oral anticoagulation ([id number] score of 5, 7.2% of lifetime stroke potential) with warfarin  INR   Date Value Ref Range Status   09/05/2021 1.18 (H) 0.85 - 1.15 Final     Comment:     Effective 7/11/2021, the reference range for this assay has changed.      --No procedure for right knee pain in the next few weeks     Hosp addendum  Discussed with Dr. Willams from cardiology.  He started her on amiodarone.  In terms of anticoagulation she has opted for warfarin.    Likely will need Zio patch to monitor for bouts of intermittent A. fib.       #Right knee pain secondary to baker's cyst and degenerative changes:  going down the stairs about 4 days ago when she reached the final step, put weight on her right leg and felt like it gave out. She did not fall or lose her balance.  She lives at home with her son and grandchildren who have helped her anytime she needs to move by supporting her weight.   --X-ray of the knee showed degenerative  changes with no evidence of fracture.  X-ray of the hip and pelvis showed severe end-stage degenerative changes involving the left hip, postoperative changes of the right total hip arthroplasty with components well seated, but no evidence of fracture.   -- Ultrasound of the lower extremity was negative for DVT but showed 4.8 cm Baker's cyst.  -- Ortho consulted,  CT scan of the right knee shows severe osteoarthritis and floating bodies.  --Continue supportive/conservative care with scheduled tylenol, flexeril and PRN tramadol. Ace wrap and knee immobilizer for support.  Will likely need outpatient orthopedic  --PT consulted.      #SALEEM: Baseline Cr around 0.8.  Creatinine on presentation in 1.17.    --resolved after IVF. Normal  Cr and GFR.    #Leukocytosis: Suspect stress reaction.  No fever.  No evidence of infection.   --resolved today      Chronic Medical Conditions  #DM2: Pt is chronically on 50 units Lantus qPM, glipizide 10 mg BID, metformin 500 mg BID.   --Resume PTA regiment and accu-checks with sliding scale insulin for now.     #HTN: Currently takes Metoprolol, Norvasc, HCTZ- Lisinopril.    --All resumed with stable BP     #HL: resume lipitor     #COPD: Not in acute exacerbation.  Continue albuterol as needed.  Recommend continue aggressive I-S while patient is still here     #Obesity: Complicates cares     DVT Prophylaxis: Pneumatic Compression Devices.  Warfarin  Code Status: DNR/DNI.  Pt is adament she would not want to be on ventilator and would not want heroic measures.   Dispo: TCU but patient does not have Covid immunization which will need 3 days or more before she is accepted according to     Interval History    She is feeling better this morning, slept well, completed her breakfast with good appetite.  No chest pain or shortness of breath.  She had an episode of lightheadedness in the toilet seat straining, in the monitor it was noted that she had as SVT.  She continues on atrial  fibrillation intermittently, in and out alternating with sinus rhythm.  Serial troponin completed after brief episode of chest pain yesterday, undetectable.  L less pain in right knee.  No other concerns.    -Data reviewed today: I reviewed all new labs and imaging results over the last 24 hours. I personally reviewed the EKG tracing showing Atrial fibrillation with RVR noticed in the monitor.    Physical Exam   Temp: 97.7  F (36.5  C) Temp src: Oral BP: (Abnormal) 143/63 Pulse: 64   Resp: 18 SpO2: 93 % O2 Device: None (Room air) Oxygen Delivery: 2 LPM  Vitals:    09/02/21 1805 09/03/21 0018   Weight: 86.2 kg (190 lb) 86 kg (189 lb 9.6 oz)     Vital Signs with Ranges  Temp:  [97.5  F (36.4  C)-98.3  F (36.8  C)] 97.7  F (36.5  C)  Pulse:  [] 64  Resp:  [18-24] 18  BP: ()/(54-77) 143/63  SpO2:  [90 %-94 %] 93 %  I/O last 3 completed shifts:  In: -   Out: 1450 [Urine:1450]    GEN:  Alert, oriented x 3, appears comfortable, NAD.  HEENT:  Normocephalic/atraumatic, no scleral icterus, no nasal discharge, mouth moist.  CV: IR IR no murmur heard or JVD seen.  LUNGS:  Clear to auscultation bilaterally without rales/rhonchi/wheezing/retractions.  Symmetric chest rise on inhalation noted.  ABD:  Active bowel sounds, soft, non-tender/non-distended.  No rebound/guarding/rigidity.  EXT:  No edema or cyanosis.  No joint synovitis noted.  SKIN:  Dry to touch, no exanthems noted in the visualized areas.       Medications     Reason anticoagulant not prescribed for atrial fibrillation       Warfarin Therapy Reminder         acetaminophen  975 mg Oral TID     amiodarone  200 mg Oral TID     [START ON 9/18/2021] amiodarone  200 mg Oral Daily     amLODIPine  10 mg Oral Daily     atorvastatin  40 mg Oral At Bedtime     cyclobenzaprine  10 mg Oral TID     diclofenac  2 g Topical 4x Daily     glipiZIDE  10 mg Oral BID AC     insulin aspart  1-7 Units Subcutaneous TID AC     insulin aspart  1-5 Units Subcutaneous At Bedtime      insulin degludec  50 Units Subcutaneous At Bedtime     lisinopril-hydrochlorothiazide  2 tablet Oral Daily     metFORMIN  500 mg Oral BID w/meals     metoprolol succinate ER  50 mg Oral Daily     miconazole   Topical BID       Data   Recent Labs   Lab 09/05/21  0555 09/05/21  0213 09/04/21 2210 09/04/21 2202 09/04/21  1802 09/04/21  1525 09/04/21  1246 09/04/21  0523 09/03/21  1801 09/03/21  1755 09/03/21  1728 09/03/21  1514 09/03/21  1100 09/03/21  0737 09/03/21  0532 09/02/21 2055   WBC  --   --   --   --   --   --   --   --   --   --   --   --   --   --  10.1 14.9*   HGB  --   --   --   --   --   --   --   --   --   --   --   --   --   --  12.7 13.6   MCV  --   --   --   --   --   --   --   --   --   --   --   --   --   --  88 88   PLT  --   --   --   --   --   --   --   --   --   --   --   --   --   --  309 335   INR 1.18*  --   --   --   --   --   --  0.97  --  1.00  --   --   --   --   --   --    NA  --   --   --   --   --   --   --  139  --   --   --   --   --   --  139 140   POTASSIUM 4.1  --   --   --   --   --   --  3.9  --   --   --   --  3.8  --  3.3* 3.7   CHLORIDE  --   --   --   --   --   --   --  109  --   --   --   --   --   --  107 107   CO2  --   --   --   --   --   --   --  29  --   --   --   --   --   --  28 25   BUN  --   --   --   --   --   --   --  32*  --   --   --   --   --   --  42* 40*   CR  --   --   --   --   --   --   --  0.57  --   --   --   --   --   --  1.02 1.17*   ANIONGAP  --   --   --   --   --   --   --  1*  --   --   --   --   --   --  4 8   NATACHA  --   --   --   --   --   --   --  9.6  --   --   --   --   --   --  9.5 9.8   GLC  --  90  --  223*  --   --  183* 209*   < >  --   --   --   --   --  272* 200*   ALBUMIN  --   --   --   --   --   --   --   --   --   --   --  2.9*  --   --   --   --    PROTTOTAL  --   --   --   --   --   --   --   --   --   --   --  6.6*  --   --   --   --    BILITOTAL  --   --   --   --   --   --   --   --   --   --   --  0.2  --   --   --   --     ALKPHOS  --   --   --   --   --   --   --   --   --   --   --  100  --   --   --   --    ALT  --   --   --   --   --   --   --   --   --   --   --  24  --   --   --   --    AST  --   --   --   --   --   --   --   --   --   --   --  16  --   --   --   --    TROPONIN  --   --  <0.015  --  <0.015 <0.015  --   --   --   --    < > 0.016 0.028   < >  --   --     < > = values in this interval not displayed.       No results found for this or any previous visit (from the past 24 hour(s)).      Disclaimer: This note consists of symbols derived from keyboarding, dictation and/or voice recognition software. As a result, there may be errors in the script that have gone undetected. Please consider this when interpreting information found in this chart.

## 2021-09-06 NOTE — PLAN OF CARE
Temp: 97.5  F (36.4  C) Temp src: Oral BP: (!) 146/65 Pulse: 72   Resp: 17 SpO2: 92 % O2 Device: None (Room air)       A&O. Up Ax2 for safety with walker and gait belt for short distance, monique steady for long distance. Denies pain at rest, pain increases with movement pt refused prn pain meds. Right knee ace wrapped, swollen. Purewick in place for overnight. Plan- pain management, cardiology following, SW following for TCU placement, monitor on tele, will need zio patch at discharge, ortho following.

## 2021-09-06 NOTE — PLAN OF CARE
"/72 (BP Location: Right arm)   Pulse 66   Temp 97.7  F (36.5  C) (Oral)   Resp 16   Ht 1.676 m (5' 6\")   Wt 86 kg (189 lb 9.6 oz)   SpO2 93%   BMI 30.60 kg/m    Neuro: Pt. A&Ox4, arousing to voice throughout the night.   Cardiac: Tele in place SR throughout night HR 60's. Denies chest pain or discomfort.  Lungs: LS clear to ascultation, infrequent productive cough reported.   GI: Bowel sounds present x4, last BM 9/2   : Continent at baseline, purewick in place, adequate output throughout the night.   Pain: Pain denied at rest, reports significant pain with movement or ambulation. Declines PRN's throughout night, scheduled tylenol, and Voltaren gel ordered throughout the day.   IV: PIV SL.   Meds: No medications administered throughout the night.    Labs/tests:  at bedtime, 108 at 0253. K+ 4.1 RN replacement orders in place, BMP and INR recheck in AM.   Diet: Carb Consistent diet 60 grams of carb.   Activity: A2 with walker and gait belt to commode, A2 with monique steady for longer distances to restroom.   Misc: ACE wrap in place to R knee, +2 edema, tender to touch. Immobilizer at bedside when oob.   Plan: PT consulted, Cards, Ortho, and SW following. Continuing to monitor on tele, plan for zio patch at time of discharge. Therapy recommending TCU placement, awaiting confirmation from SW on discharge plans.     Nursing will continue to monitor and provide cares.   *Care for Pt. 2300 until 0730.       "

## 2021-09-06 NOTE — PROGRESS NOTES
09/03/21 1505   Quick Adds   Type of Visit Initial PT Evaluation   Living Environment   People in home child(ilir), adult;grandchild(ilir)   Current Living Arrangements house   Home Accessibility stairs to enter home;stairs within home   Number of Stairs, Main Entrance 2   Number of Stairs, Within Home, Primary 8   Stair Railings, Within Home, Primary railings safe and in good condition   Self-Care   Usual Activity Tolerance good   Current Activity Tolerance poor   Regular Exercise No   Equipment Currently Used at Home walker, rolling   General Information   Onset of Illness/Injury or Date of Surgery 09/02/21   Referring Physician Robert Fernando MD   Pertinent History of Current Problem (include personal factors and/or comorbidities that impact the POC) 80 year old female with a past medical history of COPD, diabetes mellitus, hypertension, hyperlipidemia and obesity who presents with right-sided knee pain.   Existing Precautions/Restrictions fall   Weight-Bearing Status - RLE weight-bearing as tolerated   Cognition   Orientation Status (Cognition) oriented x 3   Affect/Mental Status (Cognition) WNL   Follows Commands (Cognition) WNL   Pain Assessment   Patient Currently in Pain Yes, see Vital Sign flowsheet   Posture    Posture Forward head position;Protracted shoulders   Range of Motion (ROM)   ROM Comment R knee ROM limited by pain, remaining LE ROM WFL but limited with tissue approximation   Strength   Strength Comments dec R LE pain inhibited by R knee pain   Transfers   Transfer Safety Comments mod A   Gait/Stairs (Locomotion)   Comment (Gait/Stairs) unable to ambulate   Balance   Balance Comments good in sitting, unable to stand w/o device and A  x 1   Clinical Impression   Criteria for Skilled Therapeutic Intervention yes, treatment indicated   PT Diagnosis (PT) Weakness   Influenced by the following impairments impaired gait, dec indep with transfers, pain   Functional limitations due to impairments impaired  mobility   Clinical Presentation Evolving/Changing   Clinical Presentation Rationale PMH and clinical assessment   Clinical Decision Making (Complexity) low complexity   Therapy Frequency (PT) 5x/week   Predicted Duration of Therapy Intervention (days/wks) 7 days   Planned Therapy Interventions (PT) bed mobility training;gait training;strengthening;transfer training   Risk & Benefits of therapy have been explained evaluation/treatment results reviewed;care plan/treatment goals reviewed;risks/benefits reviewed;patient   PT Discharge Planning    PT Discharge Recommendation (DC Rec) Transitional Care Facility   PT Rationale for DC Rec Pt below baseline for functional mobility and ADLS will benefit from PT during stay to be continued in rehab setting.

## 2021-09-06 NOTE — PLAN OF CARE
A&Ox4, Ax2- monique steady to bedside commode, tolerating mod-cho diet, tele- SR, lungs- clear, bowels- active, 2 small BMs, incontinent- pure wick in place, IV SL, no insulin given. Plan- to discharge to TCU, awaiting placement

## 2021-09-06 NOTE — PROGRESS NOTES
[facility]    Hospitalist Progress Note    Date of Service (when I saw the patient): 09/06/2021  Provider:  Esau Scanlon MD   Text Page  7am - 6PM       Assessment & Plan   Ruthy Becerril is a 80 year old female with a past medical history of COPD, diabetes mellitus, hypertension, hyperlipidemia and obesity who presents with right-sided knee pain.   Pt had interval complaints of substernal heaviness morning of 9/3.  GI cocktail was given that improved her symptoms.  However EKG did show new onset A. fib RVR with heart rates in the 140s.     #New onset A. fib RVR- self converted   --History of previous episodes of intermittent palpitations, suspect she might have paroxysmal atrial fibrillation  --Echocardiogram performed today shows evidence of hyperdynamic left ventricle with apical hypokinesis  --Cardiology consulted, input appreciated  --Continue monitor on telemetry  --Continue PTA beta-blocker with 100 mg twice daily.Amiodarone 200 mg 1 tab daily.   --Oral anticoagulation ([id number] score of 5, 7.2% of lifetime stroke potential) with warfarin  INR   Date Value Ref Range Status   09/06/2021 1.97 (H) 0.85 - 1.15 Final     Comment:     Effective 7/11/2021, the reference range for this assay has changed.      --No procedure for right knee pain in the next few weeks     Hosp addendum  Discussed with Dr. Willams from cardiology.  He started her on amiodarone.  In terms of anticoagulation she has opted for warfarin.    Likely will need Zio patch to monitor for bouts of intermittent A. fib.       #Right knee pain secondary to baker's cyst and degenerative changes:  going down the stairs about 4 days ago when she reached the final step, put weight on her right leg and felt like it gave out. She did not fall or lose her balance.  She lives at home with her son and grandchildren who have helped her anytime she needs to move by supporting her weight.   --X-ray of the knee showed degenerative  changes with no evidence of fracture.  X-ray of the hip and pelvis showed severe end-stage degenerative changes involving the left hip, postoperative changes of the right total hip arthroplasty with components well seated, but no evidence of fracture.   -- Ultrasound of the lower extremity was negative for DVT but showed 4.8 cm Baker's cyst.  -- Ortho consulted,  CT scan of the right knee shows severe osteoarthritis and floating bodies.  --Continue supportive/conservative care with scheduled tylenol, flexeril and PRN tramadol. Ace wrap and knee immobilizer for support.  Will likely need outpatient orthopedic  --PT consulted.      #SALEEM: Baseline Cr around 0.8.  Creatinine on presentation in 1.17.    --resolved after IVF. Normal  Cr and GFR.    #Leukocytosis: Suspect stress reaction.  No fever.  No evidence of infection.   --resolved today      Chronic Medical Conditions  #DM2: Pt is chronically on 50 units Lantus qPM, glipizide 10 mg BID, metformin 500 mg BID.   --Resume PTA regiment and accu-checks with sliding scale insulin for now.     #HTN: Currently takes Metoprolol, Norvasc, HCTZ- Lisinopril.    --All resumed with stable BP     #HL: resume lipitor     #COPD: Not in acute exacerbation.  Continue albuterol as needed.  Recommend continue aggressive I-S while patient is still here     #Obesity: Complicates cares     DVT Prophylaxis: Pneumatic Compression Devices.  Warfarin  Code Status: DNR/DNI.  Pt is adament she would not want to be on ventilator and would not want heroic measures.   Dispo: TCU but patient does not have Covid immunization which will need 3 days or more before she is accepted according to     Interval History    She is feeling better, no chest pain or shortness of breath.  She has intermittent episode of lightheadedness with palpitations. In the monitor she continues on atrial fibrillation intermittently. Sinus rhythm now. Less pain in right knee.  No other concerns.    -Data reviewed  today: I reviewed all new labs and imaging results over the last 24 hours. I personally reviewed the EKG tracing showing Atrial fibrillation with RVR noticed in the monitor.    Physical Exam   Temp: 97.6  F (36.4  C) Temp src: Oral BP: (Abnormal) 153/69 Pulse: 73   Resp: 16 SpO2: 94 % O2 Device: None (Room air)    Vitals:    09/02/21 1805 09/03/21 0018   Weight: 86.2 kg (190 lb) 86 kg (189 lb 9.6 oz)     Vital Signs with Ranges  Temp:  [97.5  F (36.4  C)-97.7  F (36.5  C)] 97.6  F (36.4  C)  Pulse:  [] 73  Resp:  [16-18] 16  BP: (131-157)/(56-75) 153/69  SpO2:  [90 %-96 %] 94 %  No intake/output data recorded.    GEN:  Alert, oriented x 3, appears comfortable, NAD.  HEENT:  Normocephalic/atraumatic, no scleral icterus, no nasal discharge, mouth moist.  CV: IR IR no murmur heard or JVD seen.  LUNGS:  Clear to auscultation bilaterally without rales/rhonchi/wheezing/retractions.  Symmetric chest rise on inhalation noted.  ABD:  Active bowel sounds, soft, non-tender/non-distended.  No rebound/guarding/rigidity.  EXT:  No edema or cyanosis.  No joint synovitis noted.  SKIN:  Dry to touch, no exanthems noted in the visualized areas.       Medications     Reason anticoagulant not prescribed for atrial fibrillation       Warfarin Therapy Reminder         acetaminophen  975 mg Oral TID     amiodarone  200 mg Oral TID     [START ON 9/18/2021] amiodarone  200 mg Oral Daily     amLODIPine  10 mg Oral Daily     atorvastatin  40 mg Oral At Bedtime     diclofenac  2 g Topical 4x Daily     glipiZIDE  10 mg Oral BID AC     insulin aspart  1-7 Units Subcutaneous TID AC     insulin aspart  1-5 Units Subcutaneous At Bedtime     insulin degludec  50 Units Subcutaneous At Bedtime     lisinopril-hydrochlorothiazide  2 tablet Oral Daily     metFORMIN  500 mg Oral BID w/meals     metoprolol succinate ER  50 mg Oral Daily     miconazole   Topical BID       Data   Recent Labs   Lab 09/06/21  0744 09/06/21  0627 09/06/21  0253  09/05/21  0555 09/04/21  2210 09/04/21  1802 09/04/21  1525 09/04/21  0523 09/03/21  1728 09/03/21  1514 09/03/21  0737 09/03/21  0532 09/02/21 2055   WBC  --   --   --   --   --   --   --   --   --   --   --  10.1 14.9*   HGB  --   --   --   --   --   --   --   --   --   --   --  12.7 13.6   MCV  --   --   --   --   --   --   --   --   --   --   --  88 88   PLT  --   --   --   --   --   --   --   --   --   --   --  309 335   INR  --  1.97*  --  1.18*  --   --   --  0.97   < >  --   --   --   --    NA  --  138  --   --   --   --   --  139  --   --   --  139 140   POTASSIUM  --  3.9  --  4.1  --   --   --  3.9  --   --    < > 3.3* 3.7   CHLORIDE  --  106  --   --   --   --   --  109  --   --   --  107 107   CO2  --  31  --   --   --   --   --  29  --   --   --  28 25   BUN  --  26  --   --   --   --   --  32*  --   --   --  42* 40*   CR  --  0.62  --   --   --   --   --  0.57  --   --   --  1.02 1.17*   ANIONGAP  --  1*  --   --   --   --   --  1*  --   --   --  4 8   NATACHA  --  9.9  --   --   --   --   --  9.6  --   --   --  9.5 9.8   * 78 108*  --   --   --   --  209*  --   --   --  272* 200*   ALBUMIN  --   --   --   --   --   --   --   --   --  2.9*  --   --   --    PROTTOTAL  --   --   --   --   --   --   --   --   --  6.6*  --   --   --    BILITOTAL  --   --   --   --   --   --   --   --   --  0.2  --   --   --    ALKPHOS  --   --   --   --   --   --   --   --   --  100  --   --   --    ALT  --   --   --   --   --   --   --   --   --  24  --   --   --    AST  --   --   --   --   --   --   --   --   --  16  --   --   --    TROPONIN  --   --   --   --  <0.015 <0.015 <0.015  --    < > 0.016   < >  --   --     < > = values in this interval not displayed.       No results found for this or any previous visit (from the past 24 hour(s)).      Disclaimer: This note consists of symbols derived from keyboarding, dictation and/or voice recognition software. As a result, there may be errors in the script that have  gone undetected. Please consider this when interpreting information found in this chart.

## 2021-09-06 NOTE — CONSULTS
"CLINICAL NUTRITION SERVICES  -  ASSESSMENT NOTE      MALNUTRITION:  % Weight Loss:  None noted --> denied by patient  % Intake:  No decreased intake noted  Subcutaneous Fat Loss:  Upper arm region mild to moderate depletion --> not using as indicator with only 1 region present   Muscle Loss:  Temporal region mild depletion, Clavicle bone region mild to moderate depletion and Acromion bone region mild to moderate depletion  Fluid Retention:  None noted or documented    Malnutrition Diagnosis: Patient does not meet two of the above criteria necessary for diagnosing malnutrition        REASON FOR ASSESSMENT  Ruthy Becerril is a 80 year old female seen by Registered Dietitian for Admission Nutrition Risk Screen for positive.    PMH of: COPD, DM, obesity.    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    NUTRITION HISTORY  - Information obtained from patient and chart.  - Diet at home: Regular, not strictly diabetic diet per report.  - Usual intakes: Meals BID-TID.  Daughter in law makes all meals.  - Barriers to PO intakes: None PTA, feels she was eating at baseline.  - Use of oral supplements: None.  - Chewing/swallowing issues: Denied despite being ?edentulous.  - Allergies: NKFA.      CURRENT NUTRITION ORDERS  Diet Order:     Regular    Current Intake/Tolerance:  No flowsheet recordings since admit.  Is consistently ordering meals.      NUTRITION FOCUSED PHYSICAL ASSESSMENT FOR DIAGNOSING MALNUTRITION)  Yes     Obtained from Chart/Interdisciplinary Team:  - No documentation of PI  - Stooling patterns reviewed  - Ortho consulted, conservative management with outpatient f/u recommended    ANTHROPOMETRICS  Height: 5' 6\"  Weight: 189 lbs 9.6 oz  Body mass index is 30.6 kg/m .  Weight Status:  Obesity Grade I BMI 30-34.9  Weight History:  Wt Readings from Last 10 Encounters:   09/03/21 86 kg (189 lb 9.6 oz)     - No previous wt trending available (care everywhere reviewed).  - Patient herself denies " wt loss though also does not know UBW.    LABS  Labs reviewed:  Electrolytes  Potassium (mmol/L)   Date Value   09/06/2021 3.9   09/05/2021 4.1   09/04/2021 3.9    Blood Glucose  Glucose (mg/dL)   Date Value   09/06/2021 78   09/04/2021 209 (H)   09/03/2021 272 (H)   09/02/2021 200 (H)     GLUCOSE BY METER POCT (mg/dL)   Date Value   09/06/2021 127 (H)   09/06/2021 135 (H)   09/06/2021 108 (H)   09/05/2021 173 (H)   09/05/2021 147 (H)     Hemoglobin A1C (%)   Date Value   09/02/2021 7.0 (H)    Inflammatory Markers  WBC Count (10e3/uL)   Date Value   09/03/2021 10.1   09/02/2021 14.9 (H)     Albumin (g/dL)   Date Value   09/03/2021 2.9 (L)      Magnesium (mg/dL)   Date Value   09/03/2021 2.1     Sodium (mmol/L)   Date Value   09/06/2021 138   09/04/2021 139   09/03/2021 139    Renal  Urea Nitrogen (mg/dL)   Date Value   09/06/2021 26   09/04/2021 32 (H)   09/03/2021 42 (H)     Creatinine (mg/dL)   Date Value   09/06/2021 0.62   09/04/2021 0.57   09/03/2021 1.02     Additional  Ketones Urine (mg/dL)   Date Value   09/03/2021 Negative        B/P: 179/92, T: 97.7, P: 79, R: 16    Lab Results   Component Value Date    A1C 7.0 09/02/2021         MEDICATIONS  Medications reviewed:    acetaminophen  975 mg Oral TID     amiodarone  200 mg Oral TID     [START ON 9/18/2021] amiodarone  200 mg Oral Daily     amLODIPine  10 mg Oral Daily     atorvastatin  40 mg Oral At Bedtime     diclofenac  2 g Topical 4x Daily     glipiZIDE  10 mg Oral BID AC     insulin aspart  1-7 Units Subcutaneous TID AC     insulin aspart  1-5 Units Subcutaneous At Bedtime     insulin degludec  50 Units Subcutaneous At Bedtime     lisinopril-hydrochlorothiazide  2 tablet Oral Daily     metFORMIN  500 mg Oral BID w/meals     metoprolol succinate ER  50 mg Oral Daily     miconazole   Topical BID     warfarin ANTICOAGULANT  2.5 mg Oral ONCE at 18:00        Reason anticoagulant not prescribed for atrial fibrillation       Warfarin Therapy Reminder             ASSESSED NUTRITION NEEDS PER APPROVED PRACTICE GUIDELINES:    Dosing Weight 86 kg   Estimated Energy Needs: 20-25 Kcal/Kg  Justification: maintenance  Estimated Protein Needs: 1-1.2 g pro/Kg  Justification: preservation of lean body mass  Estimated Fluid Needs: per MD      NUTRITION DIAGNOSIS:  Predicted inadequate nutrient intake (energy/protein) related to potential for decline in PO intakes during admit.    NUTRITION INTERVENTIONS  Recommendations / Nutrition Prescription  Diet as ordered by MD.      Implementation  Nutrition education: Provided education on current diet.    Nutrition Goals  Patient to consume at least 75% of meals TID.      MONITORING AND EVALUATION:  Progress towards goals will be monitored and evaluated per protocol and Practice Guidelines          Radha Qureshi RDN, LD  Clinical Dietitian  3rd floor/ICU: [phone]  All other floors: [phone]  Weekend/holiday: [phone]

## 2021-09-06 NOTE — PROGRESS NOTES
Care Management Follow Up    Length of Stay (days): 3    Expected Discharge Date: 9/7/21     Concerns to be Addressed: TCU placement     Patient plan of care discussed at interdisciplinary rounds: Yes    Anticipated Discharge Disposition:  TCU      Anticipated Discharge Services:  PT/OT  Anticipated Discharge DME:  renay    Education Provided on the Discharge Plan:  Pt  Patient/Family in Agreement with the Plan: Pt     Private pay costs discussed: transportation costs    Additional Information:  Pt needs TCU.  Awaiting to hear from TCU's if they have bed availability.  No answer due to Holiday.  CTS team will f/u with Admissions team and Pt tomorrow.  Pt aware of barrier.  [facility] WC will need to be set up .  Pt is aware of private cost.  Following.     Amanda Black RN BSN   Inpatient Care Coordination  [facility]  [phone]        Saba Black, RN

## 2021-09-07 NOTE — PROGRESS NOTES
"Care Management Discharge Note    Discharge Date: 09/07/2021     Discharge Disposition:  [facility] TCU, today, private room    Discharge Services:  PT/OT    Discharge Transportation:  Reviewed out of pocket cost for  [facility] WC transport, $78.65 for base rate and $5.06 per mile to the destination. Pt/family expressed understanding and are agreeable to this. Discussed with patient    Private pay costs discussed: transportation costs    PAS Confirmation Code:  Being completed  Patient/family educated on Medicare website which has current facility and service quality ratings:  Yes    Education Provided on the Discharge Plan:  Yes  Persons Notified of Discharge Plans: Patient, [facility] ([facility] [phone])  Patient/Family in Agreement with the Plan:  Yes    Handoff Referral Completed: No    Additional Information:  [facility] clinically accepted patient, private room available today. PAS being completed. Orders to be faxed. Patient aware that she will be \"quarantined\" for 14 days with no visitors allowed. [facility] WC transport arranged for 1400 today. Charge RN and PA updated. SW will continue to follow.     Your information has been submitted on September 07th, 2021 at 11:06:57 AM CDT. The confirmation number is [id number]    RAY Kent        "

## 2021-09-07 NOTE — PLAN OF CARE
INPATIENT NOTE [id number]    Admit Date: 9/2/21  Admitting Diagnosis: Rt. KNEE BAKER'S CYST; NEW ONSET A.FIB  Pertinent History: HTN, HLD, DM2, COPD, SALEEM    Pt alert and oriented, assist x2 with walker or monique steady, encouraging up to commode but has purewick in place. Discussed extensively the benefit of getting up and moving. Consistent Carb diet. Pt has redness/rash at breasts, pannus, groin - using Micatin powder. BG checks 77 and 100 - no insulin given. Rt knee ace wrapped, using voltaren. Continuous cardiac monitoring, pain management, and supportive cares.    Isolation Precautions:   n/a.    Neuro: Alert and Oriented x4  Activity: are 2 assist with Walker and Monique Steady (either one, pt prefers walker)  Telemetry Monitoring: SR 60s-80s variable  Pain: complaining of 4/10 pain in their right knee. Tylenol, topical Voltaren gel given for pain.  Labs / Tests: trops negative  GI: had BM today, good appetite  : encouraging pt to ambulate to commode for the day, will ask handoff RN to reasses need for purewick placement this evening  Medications: see MAR  LDA's: Peripheral  Fluids: is Saline locked.  Diet: Diabetic  Living Situation:   Consults: PT and Cardiology was consulted - see note  Discharge Disposition: Transitional Care Unit    Benoit Rob RN on 9/6/2021

## 2021-09-07 NOTE — PROVIDER NOTIFICATION
Patients BG 65 this am- pt reported slight dizziness with getting up to bedside commode o/w no other sx- 15g glucose gel given per orders, breakfast tray was already ordered and on the way.  Provider notified during rounds.  Re-check - pt eating breakfast.

## 2021-09-07 NOTE — PLAN OF CARE
"AOx4. Up A1 gaitbelt and walker to bedside commode. Needs encouragement with mobility. Pain in R knee \"tolerable\" with current pain regimen of voltaren gel, tylenol, and ace wrap. BG 65, 119, and 105 today (see sep. Progress note in regards to low BG). Miconazole powder applied to abd folds and apple area. Plan for pt to discharge today at 1400 via [facility] to TCU. BP (!) 149/69 (BP Location: Right arm)   Pulse 75   Temp 97.6  F (36.4  C) (Oral)   Resp 16   Ht 1.676 m (5' 6\")   Wt 86 kg (189 lb 9.6 oz)   SpO2 91%   BMI 30.60 kg/m     "

## 2021-09-07 NOTE — PLAN OF CARE
Patient's After Visit Summary was reviewed with patient and/or packet sent with [facility] tech.   Patient verbalized understanding of After Visit Summary, recommended follow up and was given an opportunity to ask questions.   Discharge medications sent home with patient/family: No- hard script for tramadol placed in packet for TCU  Discharged with transport tech    OBSERVATION patient END time: 1400

## 2021-09-07 NOTE — PLAN OF CARE
Physical Therapy Discharge Summary    Reason for therapy discharge:    Discharged to transitional care facility.    Progress towards therapy goal(s). See goals on Care Plan in [facility] electronic health record for goal details.  Goals not met.  Barriers to achieving goals:   discharge from facility.    Therapy recommendation(s):    Continued therapy is recommended.  Rationale/Recommendations:  TCU per prior PT recommendation.

## 2021-09-07 NOTE — DISCHARGE SUMMARY
[facility]  Discharge Summary        Ruthy Becerril MRN# [id number]   YOB: 1940 Age: 80 year old     Date of Admission:  9/2/2021  Date of Discharge:  9/7/2021  2:02 PM  Admitting Physician:  Cony Reynaga MD  Discharge Physician: Rosetta Noble PA-C  Discharging Service: Hospitalist     Primary Provider: Shalonda, Gary Miller  Primary Care Physician Phone Number: [phone]         Discharge Diagnoses/Problem Oriented Hospital Course (Providers):    Ruthy Becerril was admitted on 9/2/2021 by Cony Reynaga MD and I would refer you to their history and physical.  The following problems were addressed during her hospitalization:    1. New onset pAfib--self converted. Seen by cardiology, initiated on Amiodarone and warfarin  2. Right knee pain due to severe DJD/floating bodies. Incidental baker cyst.   3. SALEEM on admission, resolved with IVF  4. DM2 on insulin. Few episodes of am hypoglycemia (60-70) have decreased Tresiba  5. HTN  6. COPD  7. HLP           Code Status:      DNR / DNI        Brief Hospital Stay Summary Sent Home With Patient in AVS:        Reason for your hospital stay      Admitted for concerns of right knee pain, he was seen by orthopedic   surgery, CT scan of the knee was performed there was no fracture however   there is severe arthritis of the knee joint along with what appears to be   loose debris that could be causing the pain.  They recommended multimodal   pain control, Ace wrap and knee immobilizer for support while you regain   your strength at TCU.  You need to follow-up with orthopedic surgery in a   couple weeks for evaluation.  In addition you were found to be in new   onset atrial fibrillation with rapid ventricular response.  You were seen   by cardiology your medication has been changed to address your rate   control.  You have been placed on a heart monitor for 14 days to assess A.   fib burden.  You are a new medication called amiodarone  and your previous   metoprolol have been decreased.  You are also on new oral anticoagulation   called warfarin in which your INR will be followed at the transitional   care unit.  In addition we temporarily decrease your Tresiba at night to   40 units from 50 units as your a.m. blood glucose have been in the 60s and   70s.  This can be titrated at TCU.         Ruthy Becerril is a 80 year old female with a past medical history of COPD, diabetes mellitus, hypertension, hyperlipidemia and obesity who presents with right-sided knee pain. Pt had interval complaints of substernal heaviness morning of 9/3.  GI cocktail was given that improved her symptoms.  However EKG did show new onset A. fib RVR with heart rates in the 140s.     #New onset A. fib RVR- self converted   --History of previous episodes of intermittent palpitations, suspect she might have paroxysmal atrial fibrillation  --Echocardiogram performed today shows evidence of hyperdynamic left ventricle with apical hypokinesis  --Cardiology consulted, input appreciated, started pt on amiodarone 200 mg TID for total of 2 weeks and then transition to 200mg every day.   --Her Metoprolol was titrated down to 50 mg to accommodate for amiodarone.   --Repeat ECHO same admission showed resolution of her apical hypokinesis after her rate is controlled.   --14 day Ziopatch was placed and she will follow up with Cardiology as outpt. In 2 weeks.      #Right knee pain secondary to baker's cyst and degenerative changes:  going down the stairs about 4 days ago when she reached the final step, put weight on her right leg and felt like it gave out. She did not fall or lose her balance.  She lives at home with her son and grandchildren who have helped her anytime she needs to move by supporting her weight.   --X-ray of the knee showed degenerative changes with no evidence of fracture.  X-ray of the hip and pelvis showed severe end-stage degenerative changes involving the left  hip, postoperative changes of the right total hip arthroplasty with components well seated, but no evidence of fracture.   -- Ultrasound of the lower extremity was negative for DVT but showed 4.8 cm Baker's cyst.  -- Ortho consulted,  CT scan of the right knee shows severe osteoarthritis and floating bodies.  --Continue supportive/conservative care with scheduled tylenol, flexeril and PRN tramadol. Ace wrap and knee immobilizer for support.    --Outpt follow up with TCU in 4-6 weeks. May need eventual arthroscopy and/or total knee replacement for definitive cares.    #SALEEM: Baseline Cr around 0.8.  Creatinine on presentation in 1.17.    --resolved after IVF. Normal  Cr and GFR.      #Leukocytosis: Initially elevated on admission.  Likely related to stress to marginalization.  There is no signs of acute infection during hospitalization.  White count normal at discharge.    #DM2: Pt is chronically on 50 units Tresiba qPM, glipizide 10 mg BID, metformin 500 mg BID.   --.  The a.m. blood glucose readings have been on the lower side anywhere from the 60s to 70s.  Previously at home she has been in the 80s.  Hemoglobin A1c is 7.0  --Have decreased her Tresiba to 40 units nightly in the short-term while at TCU until her appetite improves.  This can be titrated back to her PTA dosing if appropriate.     #HTN: Currently takes Metoprolol (rate reduced) , Norvasc, HCTZ- Lisinopril.    --All resumed with stable BP     #HL: resume lipitor     #COPD: Not in acute exacerbation.  Continue albuterol as needed.  Recommend continue aggressive I-S      #Obesity: Complicates cares    CODE STATUS DNR/DNI  Patient is NOT currently vaccinated against Covid.         Important Results:      Recent Labs   Lab 09/03/21  0532 09/02/21  2055   WBC 10.1 14.9*   HGB 12.7 13.6   HCT 39.8 41.3   MCV 88 88    335     Recent Labs   Lab 09/07/21  1225 09/07/21  0936 09/07/21  0849 09/07/21  0711 09/06/21  0627 09/05/21  0555 09/04/21  0523  09/03/21  0737 09/03/21  0532   NA  --   --   --   --  138  --  139  --  139   POTASSIUM  --   --   --  4.3 3.9 4.1 3.9   < > 3.3*   CHLORIDE  --   --   --   --  106  --  109  --  107   CO2  --   --   --   --  31  --  29  --  28   ANIONGAP  --   --   --   --  1*  --  1*  --  4   * 119* 65*  --  78  --  209*  --  272*   BUN  --   --   --   --  26  --  32*  --  42*   CR  --   --   --   --  0.62  --  0.57  --  1.02   GFRESTIMATED  --   --   --   --  87  --  89  --  53*   NATACHA  --   --   --   --  9.9  --  9.6  --  9.5    < > = values in this interval not displayed.       Recent Labs   Lab 09/07/21  1225 09/07/21  0936 09/07/21  0849 09/07/21  0200 09/06/21  2212   * 119* 65* 157* 100*     Recent Labs   Lab 09/03/21  0532   TSH 1.20     Recent Labs   Lab 09/04/21  2210 09/04/21  1802 09/04/21  1525   TROPONIN <0.015 <0.015 <0.015     Recent Labs   Lab 09/03/21  1949   COLOR Light Yellow   APPEARANCE Clear   URINEGLC >=1000*   URINEBILI Negative   URINEKETONE Negative   SG 1.022   UBLD Negative   URINEPH 5.0   PROTEIN Negative   NITRITE Negative   LEUKEST Large*   RBCU 8*   WBCU 66*              Pending Results:        Unresulted Labs Ordered in the Past 30 Days of this Admission     Date and Time Order Name Status Description    9/3/2021  8:42 PM Urine Culture Preliminary             Discharge Instructions and Follow-Up:      Follow-up Appointments     Follow Up and recommended labs and tests      Follow up with halfway physician.  The following labs/tests are   recommended: INR and CBC on 9/8.  Follow up with Orthopedic surgery in 2 weeks.         Follow-up and recommended labs and tests       Follow up with Dr. Cooper regarding knee pain in clinic.      Please call the clinic to schedule an appointment. [facility]   [address] [place] [address] Phone: [phone].    [facility] has Walk-In Clinics daily from 8am-8pm 7days a   week.               Discharge  Disposition:      Discharged to home         Discharge Medications:        Current Discharge Medication List      START taking these medications    Details   acetaminophen (TYLENOL) 325 MG tablet Take 3 tablets (975 mg) by mouth 3 times daily for 5 days  Qty: 45 tablet, Refills: 0    Associated Diagnoses: Primary osteoarthritis of right knee      !! amiodarone (PACERONE) 200 MG tablet Take 1 tablet (200 mg) by mouth 3 times daily for 10 days  Qty: 30 tablet, Refills: 0    Associated Diagnoses: Paroxysmal atrial fibrillation (H)      !! amiodarone (PACERONE) 200 MG tablet Take 1 tablet (200 mg) by mouth daily  Qty: 30 tablet, Refills: 0    Associated Diagnoses: Paroxysmal atrial fibrillation (H)      diclofenac (VOLTAREN) 1 % topical gel Apply 2 g topically 4 times daily  Qty: 50 g, Refills: 0    Associated Diagnoses: Primary osteoarthritis of right knee      metoprolol succinate ER (TOPROL-XL) 50 MG 24 hr tablet Take 1 tablet (50 mg) by mouth daily  Qty: 30 tablet, Refills: 0    Associated Diagnoses: Paroxysmal atrial fibrillation (H)      miconazole (MICATIN) 2 % external powder Apply topically 2 times daily  Qty: 43 g, Refills: 0    Associated Diagnoses: Yeast infection of the skin      traMADol (ULTRAM) 50 MG tablet Take 0.5 tablets (25 mg) by mouth every 6 hours as needed for moderate pain  Qty: 15 tablet, Refills: 0    Associated Diagnoses: Acute pain of right knee      warfarin ANTICOAGULANT (COUMADIN) 1 MG tablet Take 2.5 tablets (2.5 mg) by mouth daily TAKE 2.5 mg warfarin tonight (9/7) then as directed from Pharmacy  Qty: 30 tablet, Refills: 0    Associated Diagnoses: Paroxysmal atrial fibrillation (H)       !! - Potential duplicate medications found. Please discuss with provider.      CONTINUE these medications which have CHANGED    Details   insulin degludec (TRESIBA) 100 UNIT/ML pen Inject 40 Units Subcutaneous At Bedtime    Associated Diagnoses: Type 2 diabetes mellitus without complication, with  "long-term current use of insulin (H)         CONTINUE these medications which have NOT CHANGED    Details   amLODIPine (NORVASC) 10 MG tablet Take 10 mg by mouth daily      atorvastatin (LIPITOR) 40 MG tablet Take 40 mg by mouth At Bedtime       glipiZIDE (GLUCOTROL) 10 MG tablet Take 10 mg by mouth 2 times daily (before meals)      lisinopril-hydrochlorothiazide (ZESTORETIC) 20-12.5 MG tablet Take 2 tablets by mouth daily      metFORMIN (GLUCOPHAGE) 500 MG tablet Take 500 mg by mouth 2 times daily (with meals)         STOP taking these medications       metoprolol tartrate (LOPRESSOR) 100 MG tablet Comments:   Reason for Stopping:                 Allergies:         Allergies   Allergen Reactions     Oxycodone-Acetaminophen Rash           Consultations This Hospital Stay:      Consultation during this admission received from cardiology and orthopedics         Condition and Physical on Discharge:      Discharge condition: Stable   Vitals: Blood pressure (!) 149/69, pulse 75, temperature 97.6  F (36.4  C), temperature source Oral, resp. rate 16, height 1.676 m (5' 6\"), weight 86 kg (189 lb 9.6 oz), SpO2 91 %.  189 lbs 9.6 oz    GEN:  Alert, oriented x 3, appears comfortable, NAD.  HEENT:  Normocephalic/atraumatic, no scleral icterus, no nasal discharge, mouth moist.  CV:  REGULAR, no murmur heard or JVD seen.  LUNGS:  Clear to auscultation bilaterally without rales/rhonchi/wheezing/retractions.  Symmetric chest rise on inhalation noted.  ABD:  Active bowel sounds, soft, non-tender/non-distended.  No rebound/guarding/rigidity.  EXT:  No edema or cyanosis.  No joint synovitis noted. Right knee wrapped in ace and +knee immobilizer.   SKIN:  Dry to touch, no exanthems noted in the visualized areas.           Discharge Time:      >30 mins         Image Results From This Hospital Stay (For Non-EPIC Providers):        Results for orders placed or performed during the hospital encounter of 09/02/21   XR Pelvis w Hip Right G/E " 2 Views    Narrative    EXAM: XR PELVIS AND HIP RIGHT 2 VIEWS  LOCATION: [facility]  DATE/TIME: 9/2/2021 6:59 PM    INDICATION: Right hip pain.  COMPARISON: None.      Impression    IMPRESSION: Postoperative changes of right total hip arthroplasty. Components appear well-seated. 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 There is trabecular lucency traversing the left hip and dedicated views recommended to exclude   fracture. Pelvis negative for fracture. Vascular calcifications.   XR Knee Right 1/2 Views    Narrative    EXAM: XR KNEE RT 1 /2 VW  LOCATION: [facility]  DATE/TIME: 9/2/2021 6:59 PM    INDICATION: Right knee pain.  COMPARISON: None.      Impression    IMPRESSION: Degenerative change within all 3 compartments of the right knee. No evidence for acute fracture. No significant effusion. Osteophytic spurring about the patella and lateral joint line. Chronic enthesitis at the extensor tendon attachments.   Vascular calcifications.   US Lower Extremity Venous Duplex Right    Narrative    EXAM: US LOWER EXTREMITY VENOUS DUPLEX RIGHT  LOCATION: [facility]  DATE/TIME: 9/2/2021 9:46 PM    INDICATION: Right posterior knee pain, concern for Baker's Cyst vs DVT  COMPARISON: None.  TECHNIQUE: Venous Duplex ultrasound of the right lower extremity with and without compression, augmentation and duplex. Color flow and spectral Doppler with waveform analysis performed.    FINDINGS: Exam includes the common femoral, femoral, popliteal, and contralateral common femoral veins as well as segmentally visualized deep calf veins and greater saphenous vein.     RIGHT: No deep vein thrombosis. No superficial thrombophlebitis. There is a right popliteal fossa Baker's cyst measuring 4.8 x 2.3 x 1.1 cm.      Impression    IMPRESSION:  1.  No deep venous thrombosis in the right lower extremity.    2.  4.8 cm Baker's cyst.   CT Knee Right w/o Contrast     Narrative    CT KNEE RIGHT WITHOUT CONTRAST 9/3/2021 2:20 PM     HISTORY: Knee trauma, meniscal/ligament injury suspected, x-ray done  (Age >= 1y).    COMPARISON: 2021 radiographs    Radiation dose for this scan was reduced using automated exposure  control, adjustment of the mA and/or kV according to patient size, or  iterative reconstruction technique.    FINDINGS:  Degenerative arthrosis is noted. This is associated with  moderate to severe patellofemoral lateral facet joint space narrowing  and lateral compartment joint space narrowing. Lateral femoral condyle  medial spurring and hypertrophic change of the tibial spine results in  bone contacting bone. There is a large fragmented spur or articular  body measuring approximately 1.8 x 0.8 cm lateral to the patella  within the lateral joint recess. There are probable small articular  bodies within the popliteal tendon sheath. Articular body is also  noted posterior to the cruciate ligaments. Suprapatellar recess joint  effusion and mild to moderate popliteal cyst are noted. No evidence of  acute fracture. Meniscal chondrocalcinosis is noted.      Impression    IMPRESSION:  1. Advanced osteoarthropathy, greatest in the lateral and  patellofemoral compartments. Multiple articular bodies are noted.  Joint effusion/popliteal cyst is noted.  2. No evidence of acute fracture.    LYDIA WILDER MD         SYSTEM ID:  [id number]   Echocardiogram Complete     Value    LVEF  75%    Narrative    [id number]  [id number]  [id number]  [id number]^LEATHA^ARNAUD^IRENE     [facility]  Echocardiography Laboratory  [address] [place] [address]     Name: DAKOTA BANKS  MRN: [id number]  : 1940  Study Date: 2021 11:25 AM  Age: 80 yrs  Gender: Female  Patient Location: [facility]  Reason For Study: Atrial Fibrillation  Ordering Physician: ARNAUD ZHANG  Performed By: Adela Wright RDCS     BSA: 2.0 m2  Height: 66 in  Weight: 189 lb  BP: 133/57  mmHg  ______________________________________________________________________________  Procedure  Complete Portable Echo Adult. Optison (NDC #[id number]) given intravenously.  ______________________________________________________________________________  Interpretation Summary     The visual ejection fraction is estimated at 75%.  At rest the peak intracavitatory gradient is 78.4 mmHg.  Mildly decreased right ventricular systolic function  Valvular aortic stenosis can not be excluded in this study as the dynamic LVOT  gradient masks the aortic valve gradient and the aortic valve is not well seen  on this study.  The ascending aorta is Borderline dilated.  The study was technically difficult.  ______________________________________________________________________________  Left Ventricle  The left ventricle is normal in size. There is borderline eccentric left  ventricular hypertrophy. The visual ejection fraction is estimated at 75%.  Diastolic Doppler findings (E/E' ratio and/or other parameters) suggest left  ventricular filling pressures are indeterminate. A mid-cavitary gradient is  present. At rest the peak intracavitatory gradient is 78.4 mmHg. There is  severe apical wall hypokinesis.     Right Ventricle  The right ventricle is normal size. Mildly decreased right ventricular  systolic function.     Atria  Normal left atrial size. Right atrial size is normal. There is no color  Doppler evidence of an atrial shunt.     Mitral Valve  There is mild mitral annular calcification. No systolic anterior motion of the  mitral valve. There is trace mitral regurgitation.     Tricuspid Valve  There is trace tricuspid regurgitation. The right ventricular systolic  pressure is approximated at 25.6 mmHg plus the right atrial pressure.     Aortic Valve  The aortic valve is not well visualized. Thickened aortic valve leaflets. No  aortic regurgitation is present. Valvular aortic stenosis can not be excluded  in this study as the  dynamic LVOT gradient masks the aortic valve gradient and  the aortic valve is not well seen on this study.     Pulmonic Valve  The pulmonic valve is not well visualized. There is no pulmonic valvular  regurgitation.     Vessels  The aortic root is normal size. The ascending aorta is Borderline dilated. IVC  diameter <2.1 cm collapsing >50% with sniff suggests a normal RA pressure of 3  mmHg.     Pericardium  There is no pericardial effusion.     Rhythm  The rhythm was atrial flutter.  ______________________________________________________________________________  MMode/2D Measurements & Calculations  IVSd: 1.2 cm  LVIDd: 3.3 cm  LVIDs: 1.6 cm  LVPWd: 0.98 cm  FS: 50.7 %  LV mass(C)d: 105.9 grams  LV mass(C)dI: 54.2 grams/m2  Ao root diam: 3.5 cm  LA dimension: 2.8 cm  asc Aorta Diam: 3.4 cm  LA/Ao: 0.79  LVOT diam: 2.2 cm  LVOT area: 3.6 cm2  LA Volume (BP): 36.7 ml  LA Volume Index (BP): 18.8 ml/m2     RWT: 0.60     Doppler Measurements & Calculations  MV E max negrita: 114.5 cm/sec  MV max P.9 mmHg  MV mean PG: 3.9 mmHg  MV V2 VTI: 16.8 cm  MV dec time: 0.16 sec  TR max negrita: 251.6 cm/sec  TR max P.6 mmHg  E/E' av.6  Lateral E/e': 11.1  Medial E/e': 12.1     ______________________________________________________________________________  Report approved by: Yumiko Dumont 2021 12:57 PM         Echocardiogram Limited     Value    LVEF  65-70%    Narrative    [id number]  [id number]  [id number]  [id number]^RAYNE^JONATHON^BUCKY     [facility]  Echocardiography Laboratory  [address] [place] [address]     Name: DAKOTA BANKS  MRN: [id number]  : 1940  Study Date: 2021 03:02 PM  Age: 80 yrs  Gender: Female  Patient Location: [facility]  Reason For Study: Atrial Fibrillation  Ordering Physician: JONATHON MCLAIN  Referring Physician: Gary Liz [place]  Performed By: Adela Wright RDCS     BSA: 2.0 m2  Height: 66 in  Weight: 189 lb  HR: 69  BP: 144/72  mmHg  ______________________________________________________________________________  Procedure  Limited Portable Echo Adult. Optison (NDC #[id number]) given intravenously.  ______________________________________________________________________________  Interpretation Summary     Left ventricular systolic function is normal. The visual ejection fraction is  65-70%. No regional wall motion abnormalities noted.  Echo findings are not consistent with left ventricular outflow obstruction.  Right ventricle is not well visualized, however global systolic function is  probably normal.  Sinus rhythm was noted.     Compared to a prior TTE from earlier today, rhythm is now NSR, LV function is  less hyperdynamic, prior LVOT gradient and apical hypokinesis has resolved.  ______________________________________________________________________________  Left Ventricle  Echo findings are not consistent with left ventricular outflow obstruction.  Left ventricular systolic function is normal. The visual ejection fraction is  65-70%. No regional wall motion abnormalities noted.     Right Ventricle  The right ventricle is not well visualized. Right ventricle is not well  visualized, however global systolic function is probably normal.     Rhythm  Sinus rhythm was noted.     ______________________________________________________________________________  Report approved by: Isrrael Ho, MDon 09/03/2021 03:37 PM     ______________________________________________________________________________

## 2021-09-07 NOTE — PHARMACY-ANTICOAGULATION SERVICE
Clinical Pharmacy- Warfarin Discharge Note  This patient is currently on warfarin for the treatment of Atrial fibrillation.  INR Goal= 2-3  Expected length of therapy undetermined.           Anticoagulation Dose History     Recent Dosing and Labs Latest Ref Rng & Units 9/3/2021 9/4/2021 9/5/2021 9/6/2021 9/7/2021    SIRENA IMS TEMPLATE - - - 7.5 mg - -    Warfarin 2.5 mg - - - - 2.5 mg -    Warfarin 5 mg - 5 mg 5 mg - - -    INR 0.85 - 1.15 1.00 0.97 1.18(H) 1.97(H) 2.92(H)          New to warfarin. Agree with plan: Follow up with Nursing home physician.  INR on 9/8. Pt will take warfarin 2.5mg today.

## 2021-09-07 NOTE — PLAN OF CARE
"BP (!) 154/64 (BP Location: Right arm)   Pulse 89   Temp 97.8  F (36.6  C) (Oral)   Resp 18   Ht 1.676 m (5' 6\")   Wt 86 kg (189 lb 9.6 oz)   SpO2 90%   BMI 30.60 kg/m    Neuro: Pt. A&Ox4, .   Cardiac: Tele in place SR throughout night HR 70's-80's throughout the night. Denies chest pain or discomfort.  Lungs: LS clear to ascultation, infrequent productive cough reported.   GI: Bowel sounds present x4, last BM 9/6, had some gas discomfort and nausea this evening. Pt. Reported she awoke from sleep feeling extremely nauseated. Began dry heaving, PRN Zofran administered, and Pt. Got up to commode and passed gas. Pt. Reported relief of symptoms.    : Continent at baseline with some dribbling. Purewick removed this evening, encouraged to get oob, up to commode, Pt. Compliant.   Pain: Pain denied at rest, reports significant pain with movement or ambulation. Declines PRN's throughout night, scheduled tylenol, and Voltaren gel ordered throughout the day.   IV: PIV SL.   Meds: IV Zofran throughout the day.    Labs/tests:  at bedtime, 157 at 0200. K+ 4.3  RN replacement orders in place. INR 2.92 this AM.   Diet: Carb Consistent diet 60 grams of carb.   Activity: A1-A2 with walker and gait belt to commode.   Misc: ACE wrap in place to R knee, +2 edema, tender to touch. Immobilizer at bedside when oob.   Plan: PT consulted, Cards, Ortho, and SW following. Continuing to monitor on tele, plan for zio patch at time of discharge. Therapy recommending TCU placement, awaiting confirmation from SW on discharge plans.     Nursing will continue to monitor and provide cares.   *Care for Pt. 2300 until 0730.       "

## 2021-11-19 NOTE — PLAN OF CARE
Paged provider for low potassium.    Patient Seen in: [facility] AND [facility] Emergency Department      History   Patient presents with:  Knee Pain    Stated Complaint: homeless knee pain    Subjective:   HPI    81year old male with no known medical issues who presents via EMS after he was found and Rhythm: Normal rate. Pulses:           Dorsalis pedis pulses are 2+ on the right side and 2+ on the left side.       Comments: Chronic venous stasis changes to bilateral lower extremities  Pulmonary:      Effort: Pulmonary effort is normal. No resp [address]  [place] [address]  [phone]    Schedule an appointment as soon as possible for a visit  call with update on your visit and symptoms    We recommend that you schedule follow up care with a primary care provider within the next three months to obtain > 4 METS

## 2022-01-01 ENCOUNTER — APPOINTMENT (OUTPATIENT)
Dept: GENERAL RADIOLOGY | Facility: CLINIC | Age: 82
DRG: 871 | End: 2022-01-01
Attending: EMERGENCY MEDICINE
Payer: MEDICARE

## 2022-01-01 ENCOUNTER — APPOINTMENT (OUTPATIENT)
Dept: ULTRASOUND IMAGING | Facility: CLINIC | Age: 82
DRG: 871 | End: 2022-01-01
Attending: INTERNAL MEDICINE
Payer: MEDICARE

## 2022-01-01 ENCOUNTER — HOSPITAL ENCOUNTER (INPATIENT)
Facility: CLINIC | Age: 82
LOS: 4 days | DRG: 871 | End: 2022-01-14
Attending: EMERGENCY MEDICINE | Admitting: INTERNAL MEDICINE
Payer: MEDICARE

## 2022-01-01 ENCOUNTER — APPOINTMENT (OUTPATIENT)
Dept: PHYSICAL THERAPY | Facility: CLINIC | Age: 82
DRG: 871 | End: 2022-01-01
Payer: MEDICARE

## 2022-01-01 ENCOUNTER — APPOINTMENT (OUTPATIENT)
Dept: CT IMAGING | Facility: CLINIC | Age: 82
DRG: 871 | End: 2022-01-01
Attending: EMERGENCY MEDICINE
Payer: MEDICARE

## 2022-01-01 ENCOUNTER — APPOINTMENT (OUTPATIENT)
Dept: GENERAL RADIOLOGY | Facility: CLINIC | Age: 82
DRG: 871 | End: 2022-01-01
Attending: INTERNAL MEDICINE
Payer: MEDICARE

## 2022-01-01 ENCOUNTER — APPOINTMENT (OUTPATIENT)
Dept: PHYSICAL THERAPY | Facility: CLINIC | Age: 82
DRG: 871 | End: 2022-01-01
Attending: PHYSICIAN ASSISTANT
Payer: MEDICARE

## 2022-01-01 ENCOUNTER — APPOINTMENT (OUTPATIENT)
Dept: CT IMAGING | Facility: CLINIC | Age: 82
DRG: 871 | End: 2022-01-01
Attending: INTERNAL MEDICINE
Payer: MEDICARE

## 2022-01-01 DIAGNOSIS — Z79.01 ANTICOAGULATED ON WARFARIN: ICD-10-CM

## 2022-01-01 DIAGNOSIS — M25.552 HIP PAIN, LEFT: ICD-10-CM
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
## 2022-01-01 DIAGNOSIS — S40.812A ABRASION OF LEFT UPPER EXTREMITY, INITIAL ENCOUNTER: ICD-10-CM

## 2022-01-01 DIAGNOSIS — U07.1 INFECTION DUE TO 2019 NOVEL CORONAVIRUS: ICD-10-CM

## 2022-01-01 DIAGNOSIS — R65.20 SEVERE SEPSIS (H): ICD-10-CM

## 2022-01-01 DIAGNOSIS — M79.602 PAIN OF LEFT UPPER EXTREMITY: ICD-10-CM

## 2022-01-01 DIAGNOSIS — A41.9 SEVERE SEPSIS (H): ICD-10-CM

## 2022-01-01 LAB
ABO/RH(D): NORMAL
ABO/RH(D): NORMAL
ALBUMIN SERPL-MCNC: 2.6 G/DL (ref 3.4–5)
ALBUMIN UR-MCNC: 50 MG/DL
ALP SERPL-CCNC: 110 U/L (ref 40–150)
ALT SERPL W P-5'-P-CCNC: 77 U/L (ref 0–50)
ANION GAP SERPL CALCULATED.3IONS-SCNC: 11 MMOL/L (ref 3–14)
ANION GAP SERPL CALCULATED.3IONS-SCNC: 18 MMOL/L (ref 3–14)
ANION GAP SERPL CALCULATED.3IONS-SCNC: 3 MMOL/L (ref 3–14)
ANION GAP SERPL CALCULATED.3IONS-SCNC: 4 MMOL/L (ref 3–14)
ANION GAP SERPL CALCULATED.3IONS-SCNC: 6 MMOL/L (ref 3–14)
ANION GAP SERPL CALCULATED.3IONS-SCNC: 7 MMOL/L (ref 3–14)
ANION GAP SERPL CALCULATED.3IONS-SCNC: 7 MMOL/L (ref 3–14)
ANTIBODY SCREEN: NEGATIVE
APPEARANCE UR: ABNORMAL
APTT PPP: 52 SECONDS (ref 22–38)
AST SERPL W P-5'-P-CCNC: 75 U/L (ref 0–45)
ATRIAL RATE - MUSE: 69 BPM
ATRIAL RATE - MUSE: 80 BPM
BACTERIA #/AREA URNS HPF: ABNORMAL /HPF
BACTERIA BLD CULT: ABNORMAL
BACTERIA BLD CULT: ABNORMAL
BACTERIA UR CULT: ABNORMAL
BASE EXCESS BLDV CALC-SCNC: 1.1 MMOL/L (ref -7.7–1.9)
BASOPHILS # BLD AUTO: 0 10E3/UL (ref 0–0.2)
BASOPHILS # BLD AUTO: 0.1 10E3/UL (ref 0–0.2)
BASOPHILS # BLD AUTO: 0.1 10E3/UL (ref 0–0.2)
BASOPHILS NFR BLD AUTO: 1 %
BILIRUB SERPL-MCNC: 0.4 MG/DL (ref 0.2–1.3)
BILIRUB UR QL STRIP: NEGATIVE
BLD PROD TYP BPU: NORMAL
BLOOD COMPONENT TYPE: NORMAL
BUN SERPL-MCNC: 18 MG/DL (ref 7–30)
BUN SERPL-MCNC: 22 MG/DL (ref 7–30)
BUN SERPL-MCNC: 24 MG/DL (ref 7–30)
BUN SERPL-MCNC: 25 MG/DL (ref 7–30)
BUN SERPL-MCNC: 30 MG/DL (ref 7–30)
BUN SERPL-MCNC: 35 MG/DL (ref 7–30)
BUN SERPL-MCNC: 43 MG/DL (ref 7–30)
CALCIUM SERPL-MCNC: 8 MG/DL (ref 8.5–10.1)
CALCIUM SERPL-MCNC: 8.5 MG/DL (ref 8.5–10.1)
CALCIUM SERPL-MCNC: 8.7 MG/DL (ref 8.5–10.1)
CALCIUM SERPL-MCNC: 8.9 MG/DL (ref 8.5–10.1)
CALCIUM SERPL-MCNC: 8.9 MG/DL (ref 8.5–10.1)
CALCIUM SERPL-MCNC: 9.1 MG/DL (ref 8.5–10.1)
CALCIUM SERPL-MCNC: 9.7 MG/DL (ref 8.5–10.1)
CHLORIDE BLD-SCNC: 101 MMOL/L (ref 94–109)
CHLORIDE BLD-SCNC: 101 MMOL/L (ref 94–109)
CHLORIDE BLD-SCNC: 102 MMOL/L (ref 94–109)
CHLORIDE BLD-SCNC: 105 MMOL/L (ref 94–109)
CHLORIDE BLD-SCNC: 106 MMOL/L (ref 94–109)
CHLORIDE BLD-SCNC: 107 MMOL/L (ref 94–109)
CHLORIDE BLD-SCNC: 107 MMOL/L (ref 94–109)
CK SERPL-CCNC: 131 U/L (ref 30–225)
CO2 SERPL-SCNC: 15 MMOL/L (ref 20–32)
CO2 SERPL-SCNC: 22 MMOL/L (ref 20–32)
CO2 SERPL-SCNC: 27 MMOL/L (ref 20–32)
CO2 SERPL-SCNC: 28 MMOL/L (ref 20–32)
CO2 SERPL-SCNC: 28 MMOL/L (ref 20–32)
CO2 SERPL-SCNC: 30 MMOL/L (ref 20–32)
CO2 SERPL-SCNC: 30 MMOL/L (ref 20–32)
CODING SYSTEM: NORMAL
COLOR UR AUTO: ABNORMAL
CREAT SERPL-MCNC: 0.63 MG/DL (ref 0.52–1.04)
CREAT SERPL-MCNC: 0.72 MG/DL (ref 0.52–1.04)
CREAT SERPL-MCNC: 0.74 MG/DL (ref 0.52–1.04)
CREAT SERPL-MCNC: 0.83 MG/DL (ref 0.52–1.04)
CREAT SERPL-MCNC: 1.02 MG/DL (ref 0.52–1.04)
CREAT SERPL-MCNC: 1.14 MG/DL (ref 0.52–1.04)
CREAT SERPL-MCNC: 1.58 MG/DL (ref 0.52–1.04)
CROSSMATCH: NORMAL
CRP SERPL-MCNC: 12.9 MG/L (ref 0–8)
CRP SERPL-MCNC: 22.8 MG/L (ref 0–8)
CRP SERPL-MCNC: 54.8 MG/L (ref 0–8)
CRP SERPL-MCNC: 61.6 MG/L (ref 0–8)
D DIMER PPP FEU-MCNC: 0.33 UG/ML FEU (ref 0–0.5)
D DIMER PPP FEU-MCNC: 0.44 UG/ML FEU (ref 0–0.5)
D DIMER PPP FEU-MCNC: 0.56 UG/ML FEU (ref 0–0.5)
D DIMER PPP FEU-MCNC: <0.27 UG/ML FEU (ref 0–0.5)
DIASTOLIC BLOOD PRESSURE - MUSE: NORMAL MMHG
DIASTOLIC BLOOD PRESSURE - MUSE: NORMAL MMHG
ENTEROCOCCUS FAECALIS: NOT DETECTED
ENTEROCOCCUS FAECIUM: NOT DETECTED
EOSINOPHIL # BLD AUTO: 0 10E3/UL (ref 0–0.7)
EOSINOPHIL # BLD AUTO: 0 10E3/UL (ref 0–0.7)
EOSINOPHIL # BLD AUTO: 0.1 10E3/UL (ref 0–0.7)
EOSINOPHIL NFR BLD AUTO: 0 %
EOSINOPHIL NFR BLD AUTO: 0 %
EOSINOPHIL NFR BLD AUTO: 1 %
ERYTHROCYTE [DISTWIDTH] IN BLOOD BY AUTOMATED COUNT: 13.2 % (ref 10–15)
ERYTHROCYTE [DISTWIDTH] IN BLOOD BY AUTOMATED COUNT: 13.4 % (ref 10–15)
ERYTHROCYTE [DISTWIDTH] IN BLOOD BY AUTOMATED COUNT: 13.5 % (ref 10–15)
ERYTHROCYTE [DISTWIDTH] IN BLOOD BY AUTOMATED COUNT: 13.6 % (ref 10–15)
ERYTHROCYTE [DISTWIDTH] IN BLOOD BY AUTOMATED COUNT: 13.7 % (ref 10–15)
ERYTHROCYTE [DISTWIDTH] IN BLOOD BY AUTOMATED COUNT: 13.8 % (ref 10–15)
ERYTHROCYTE [DISTWIDTH] IN BLOOD BY AUTOMATED COUNT: 13.8 % (ref 10–15)
FIBRINOGEN PPP-MCNC: 264 MG/DL (ref 170–490)
FIBRINOGEN PPP-MCNC: 361 MG/DL (ref 170–490)
FIBRINOGEN PPP-MCNC: 376 MG/DL (ref 170–490)
FIBRINOGEN PPP-MCNC: 381 MG/DL (ref 170–490)
FLUAV RNA SPEC QL NAA+PROBE: NEGATIVE
FLUBV RNA RESP QL NAA+PROBE: NEGATIVE
GFR SERPL CREATININE-BSD FRML MDRD: 33 ML/MIN/1.73M2
GFR SERPL CREATININE-BSD FRML MDRD: 49 ML/MIN/1.73M2
GFR SERPL CREATININE-BSD FRML MDRD: 56 ML/MIN/1.73M2
GFR SERPL CREATININE-BSD FRML MDRD: 71 ML/MIN/1.73M2
GFR SERPL CREATININE-BSD FRML MDRD: 82 ML/MIN/1.73M2
GFR SERPL CREATININE-BSD FRML MDRD: 85 ML/MIN/1.73M2
GFR SERPL CREATININE-BSD FRML MDRD: 90 ML/MIN/1.73M2
GLUCOSE BLD-MCNC: 173 MG/DL (ref 70–99)
GLUCOSE BLD-MCNC: 247 MG/DL (ref 70–99)
GLUCOSE BLD-MCNC: 285 MG/DL (ref 70–99)
GLUCOSE BLD-MCNC: 299 MG/DL (ref 70–99)
GLUCOSE BLD-MCNC: 469 MG/DL (ref 70–99)
GLUCOSE BLD-MCNC: 485 MG/DL (ref 70–99)
GLUCOSE BLD-MCNC: 93 MG/DL (ref 70–99)
GLUCOSE BLDC GLUCOMTR-MCNC: 104 MG/DL (ref 70–99)
GLUCOSE BLDC GLUCOMTR-MCNC: 113 MG/DL (ref 70–99)
GLUCOSE BLDC GLUCOMTR-MCNC: 215 MG/DL (ref 70–99)
GLUCOSE BLDC GLUCOMTR-MCNC: 262 MG/DL (ref 70–99)
GLUCOSE BLDC GLUCOMTR-MCNC: 267 MG/DL (ref 70–99)
GLUCOSE BLDC GLUCOMTR-MCNC: 288 MG/DL (ref 70–99)
GLUCOSE BLDC GLUCOMTR-MCNC: 295 MG/DL (ref 70–99)
GLUCOSE BLDC GLUCOMTR-MCNC: 298 MG/DL (ref 70–99)
GLUCOSE BLDC GLUCOMTR-MCNC: 303 MG/DL (ref 70–99)
GLUCOSE BLDC GLUCOMTR-MCNC: 314 MG/DL (ref 70–99)
GLUCOSE BLDC GLUCOMTR-MCNC: 327 MG/DL (ref 70–99)
GLUCOSE BLDC GLUCOMTR-MCNC: 365 MG/DL (ref 70–99)
GLUCOSE BLDC GLUCOMTR-MCNC: 365 MG/DL (ref 70–99)
GLUCOSE BLDC GLUCOMTR-MCNC: 367 MG/DL (ref 70–99)
GLUCOSE BLDC GLUCOMTR-MCNC: 382 MG/DL (ref 70–99)
GLUCOSE BLDC GLUCOMTR-MCNC: 386 MG/DL (ref 70–99)
GLUCOSE BLDC GLUCOMTR-MCNC: 394 MG/DL (ref 70–99)
GLUCOSE BLDC GLUCOMTR-MCNC: 397 MG/DL (ref 70–99)
GLUCOSE BLDC GLUCOMTR-MCNC: 397 MG/DL (ref 70–99)
GLUCOSE BLDC GLUCOMTR-MCNC: 412 MG/DL (ref 70–99)
GLUCOSE BLDC GLUCOMTR-MCNC: 414 MG/DL (ref 70–99)
GLUCOSE BLDC GLUCOMTR-MCNC: 42 MG/DL (ref 70–99)
GLUCOSE BLDC GLUCOMTR-MCNC: 446 MG/DL (ref 70–99)
GLUCOSE BLDC GLUCOMTR-MCNC: 478 MG/DL (ref 70–99)
GLUCOSE BLDC GLUCOMTR-MCNC: 75 MG/DL (ref 70–99)
GLUCOSE BLDC GLUCOMTR-MCNC: 92 MG/DL (ref 70–99)
GLUCOSE BLDC GLUCOMTR-MCNC: 93 MG/DL (ref 70–99)
GLUCOSE UR STRIP-MCNC: 100 MG/DL
HBA1C MFR BLD: 7.3 % (ref 0–5.6)
HBA1C MFR BLD: 7.7 % (ref 0–5.6)
HCO3 BLDV-SCNC: 27 MMOL/L (ref 21–28)
HCT VFR BLD AUTO: 21.4 % (ref 35–47)
HCT VFR BLD AUTO: 25.7 % (ref 35–47)
HCT VFR BLD AUTO: 35.8 % (ref 35–47)
HCT VFR BLD AUTO: 36.4 % (ref 35–47)
HCT VFR BLD AUTO: 36.8 % (ref 35–47)
HCT VFR BLD AUTO: 37.1 % (ref 35–47)
HCT VFR BLD AUTO: 43.9 % (ref 35–47)
HGB BLD-MCNC: 11 G/DL (ref 11.7–15.7)
HGB BLD-MCNC: 11.1 G/DL (ref 11.7–15.7)
HGB BLD-MCNC: 11.4 G/DL (ref 11.7–15.7)
HGB BLD-MCNC: 11.6 G/DL (ref 11.7–15.7)
HGB BLD-MCNC: 13.5 G/DL (ref 11.7–15.7)
HGB BLD-MCNC: 6.6 G/DL (ref 11.7–15.7)
HGB BLD-MCNC: 8.2 G/DL (ref 11.7–15.7)
HGB UR QL STRIP: ABNORMAL
HOLD SPECIMEN: NORMAL
HYALINE CASTS: 4 /LPF
IMM GRANULOCYTES # BLD: 0 10E3/UL
IMM GRANULOCYTES # BLD: 0 10E3/UL
IMM GRANULOCYTES # BLD: 0.1 10E3/UL
IMM GRANULOCYTES NFR BLD: 0 %
IMM GRANULOCYTES NFR BLD: 1 %
IMM GRANULOCYTES NFR BLD: 1 %
INR PPP: 1.37 (ref 0.85–1.15)
INR PPP: 1.42 (ref 0.85–1.15)
INR PPP: 1.49 (ref 0.85–1.15)
INR PPP: 1.6 (ref 0.85–1.15)
INR PPP: 2.66 (ref 0.85–1.15)
INR PPP: 3.15 (ref 0.85–1.15)
INTERPRETATION ECG - MUSE: NORMAL
INTERPRETATION ECG - MUSE: NORMAL
KETONES UR STRIP-MCNC: NEGATIVE MG/DL
LACTATE SERPL-SCNC: 1.8 MMOL/L (ref 0.7–2)
LACTATE SERPL-SCNC: 14.3 MMOL/L (ref 0.7–2)
LACTATE SERPL-SCNC: 3.5 MMOL/L (ref 0.7–2)
LACTATE SERPL-SCNC: 4.5 MMOL/L (ref 0.7–2)
LDH SERPL L TO P-CCNC: 194 U/L (ref 81–234)
LDH SERPL L TO P-CCNC: 248 U/L (ref 81–234)
LEUKOCYTE ESTERASE UR QL STRIP: NEGATIVE
LISTERIA SPECIES (DETECTED/NOT DETECTED): NOT DETECTED
LYMPHOCYTES # BLD AUTO: 0.5 10E3/UL (ref 0.8–5.3)
LYMPHOCYTES # BLD AUTO: 1 10E3/UL (ref 0.8–5.3)
LYMPHOCYTES # BLD AUTO: 1.3 10E3/UL (ref 0.8–5.3)
LYMPHOCYTES NFR BLD AUTO: 20 %
LYMPHOCYTES NFR BLD AUTO: 22 %
LYMPHOCYTES NFR BLD AUTO: 4 %
MCH RBC QN AUTO: 27.7 PG (ref 26.5–33)
MCH RBC QN AUTO: 27.8 PG (ref 26.5–33)
MCH RBC QN AUTO: 28 PG (ref 26.5–33)
MCH RBC QN AUTO: 28 PG (ref 26.5–33)
MCH RBC QN AUTO: 28.1 PG (ref 26.5–33)
MCH RBC QN AUTO: 28.2 PG (ref 26.5–33)
MCH RBC QN AUTO: 28.4 PG (ref 26.5–33)
MCHC RBC AUTO-ENTMCNC: 30.5 G/DL (ref 31.5–36.5)
MCHC RBC AUTO-ENTMCNC: 30.7 G/DL (ref 31.5–36.5)
MCHC RBC AUTO-ENTMCNC: 30.7 G/DL (ref 31.5–36.5)
MCHC RBC AUTO-ENTMCNC: 30.8 G/DL (ref 31.5–36.5)
MCHC RBC AUTO-ENTMCNC: 30.8 G/DL (ref 31.5–36.5)
MCHC RBC AUTO-ENTMCNC: 31.5 G/DL (ref 31.5–36.5)
MCHC RBC AUTO-ENTMCNC: 31.9 G/DL (ref 31.5–36.5)
MCV RBC AUTO: 89 FL (ref 78–100)
MCV RBC AUTO: 89 FL (ref 78–100)
MCV RBC AUTO: 90 FL (ref 78–100)
MCV RBC AUTO: 91 FL (ref 78–100)
MCV RBC AUTO: 91 FL (ref 78–100)
MCV RBC AUTO: 92 FL (ref 78–100)
MCV RBC AUTO: 92 FL (ref 78–100)
MONOCYTES # BLD AUTO: 0.4 10E3/UL (ref 0–1.3)
MONOCYTES # BLD AUTO: 0.7 10E3/UL (ref 0–1.3)
MONOCYTES # BLD AUTO: 0.8 10E3/UL (ref 0–1.3)
MONOCYTES NFR BLD AUTO: 13 %
MONOCYTES NFR BLD AUTO: 6 %
MONOCYTES NFR BLD AUTO: 9 %
MUCOUS THREADS #/AREA URNS LPF: PRESENT /LPF
NEUTROPHILS # BLD AUTO: 10.2 10E3/UL (ref 1.6–8.3)
NEUTROPHILS # BLD AUTO: 3.4 10E3/UL (ref 1.6–8.3)
NEUTROPHILS # BLD AUTO: 3.8 10E3/UL (ref 1.6–8.3)
NEUTROPHILS NFR BLD AUTO: 63 %
NEUTROPHILS NFR BLD AUTO: 69 %
NEUTROPHILS NFR BLD AUTO: 88 %
NITRATE UR QL: POSITIVE
NRBC # BLD AUTO: 0 10E3/UL
NRBC BLD AUTO-RTO: 0 /100
O2/TOTAL GAS SETTING VFR VENT: 4 %
P AXIS - MUSE: 86 DEGREES
P AXIS - MUSE: NORMAL DEGREES
PCO2 BLDV: 50 MM HG (ref 40–50)
PH BLDV: 7.34 [PH] (ref 7.32–7.43)
PH UR STRIP: 5.5 [PH] (ref 5–7)
PLATELET # BLD AUTO: 190 10E3/UL (ref 150–450)
PLATELET # BLD AUTO: 202 10E3/UL (ref 150–450)
PLATELET # BLD AUTO: 207 10E3/UL (ref 150–450)
PLATELET # BLD AUTO: 212 10E3/UL (ref 150–450)
PLATELET # BLD AUTO: 267 10E3/UL (ref 150–450)
PLATELET # BLD AUTO: 284 10E3/UL (ref 150–450)
PLATELET # BLD AUTO: 307 10E3/UL (ref 150–450)
PO2 BLDV: 18 MM HG (ref 25–47)
POTASSIUM BLD-SCNC: 3.1 MMOL/L (ref 3.4–5.3)
POTASSIUM BLD-SCNC: 3.5 MMOL/L (ref 3.4–5.3)
POTASSIUM BLD-SCNC: 3.6 MMOL/L (ref 3.4–5.3)
POTASSIUM BLD-SCNC: 4.1 MMOL/L (ref 3.4–5.3)
POTASSIUM BLD-SCNC: 4.2 MMOL/L (ref 3.4–5.3)
POTASSIUM BLD-SCNC: 4.3 MMOL/L (ref 3.4–5.3)
POTASSIUM BLD-SCNC: 4.7 MMOL/L (ref 3.4–5.3)
POTASSIUM BLD-SCNC: 4.8 MMOL/L (ref 3.4–5.3)
POTASSIUM BLD-SCNC: 5.1 MMOL/L (ref 3.4–5.3)
PR INTERVAL - MUSE: 196 MS
PR INTERVAL - MUSE: NORMAL MS
PROT SERPL-MCNC: 6 G/DL (ref 6.8–8.8)
QRS DURATION - MUSE: 74 MS
QRS DURATION - MUSE: 84 MS
QT - MUSE: 390 MS
QT - MUSE: 440 MS
QTC - MUSE: 447 MS
QTC - MUSE: 471 MS
R AXIS - MUSE: 14 DEGREES
R AXIS - MUSE: 24 DEGREES
RBC # BLD AUTO: 2.36 10E6/UL (ref 3.8–5.2)
RBC # BLD AUTO: 2.89 10E6/UL (ref 3.8–5.2)
RBC # BLD AUTO: 3.95 10E6/UL (ref 3.8–5.2)
RBC # BLD AUTO: 3.96 10E6/UL (ref 3.8–5.2)
RBC # BLD AUTO: 4.05 10E6/UL (ref 3.8–5.2)
RBC # BLD AUTO: 4.12 10E6/UL (ref 3.8–5.2)
RBC # BLD AUTO: 4.88 10E6/UL (ref 3.8–5.2)
RBC URINE: 2 /HPF
SARS-COV-2 RNA RESP QL NAA+PROBE: POSITIVE
SODIUM SERPL-SCNC: 134 MMOL/L (ref 133–144)
SODIUM SERPL-SCNC: 134 MMOL/L (ref 133–144)
SODIUM SERPL-SCNC: 138 MMOL/L (ref 133–144)
SODIUM SERPL-SCNC: 139 MMOL/L (ref 133–144)
SODIUM SERPL-SCNC: 142 MMOL/L (ref 133–144)
SP GR UR STRIP: 1.02 (ref 1–1.03)
SPECIMEN EXPIRATION DATE: NORMAL
SPECIMEN EXPIRATION DATE: NORMAL
SQUAMOUS EPITHELIAL: 1 /HPF
STAPHYLOCOCCUS AUREUS: NOT DETECTED
STAPHYLOCOCCUS EPIDERMIDIS: NOT DETECTED
STAPHYLOCOCCUS LUGDUNENSIS: NOT DETECTED
STAPHYLOCOCCUS SPECIES: NOT DETECTED
STREPTOCOCCUS AGALACTIAE: NOT DETECTED
STREPTOCOCCUS ANGINOSUS GROUP: NOT DETECTED
STREPTOCOCCUS PNEUMONIAE: NOT DETECTED
STREPTOCOCCUS PYOGENES: NOT DETECTED
STREPTOCOCCUS SPECIES: NOT DETECTED
SYSTOLIC BLOOD PRESSURE - MUSE: NORMAL MMHG
SYSTOLIC BLOOD PRESSURE - MUSE: NORMAL MMHG
T AXIS - MUSE: 60 DEGREES
T AXIS - MUSE: 82 DEGREES
TROPONIN I SERPL HS-MCNC: 14 NG/L
TROPONIN I SERPL HS-MCNC: 8 NG/L
TROPONIN I SERPL HS-MCNC: 9 NG/L
UNIT ABO/RH: NORMAL
UNIT NUMBER: NORMAL
UNIT STATUS: NORMAL
UNIT TYPE ISBT: 5100
UROBILINOGEN UR STRIP-MCNC: NORMAL MG/DL
VENTRICULAR RATE- MUSE: 69 BPM
VENTRICULAR RATE- MUSE: 79 BPM
WBC # BLD AUTO: 11.7 10E3/UL (ref 4–11)
WBC # BLD AUTO: 12.8 10E3/UL (ref 4–11)
WBC # BLD AUTO: 14.5 10E3/UL (ref 4–11)
WBC # BLD AUTO: 3.7 10E3/UL (ref 4–11)
WBC # BLD AUTO: 4.9 10E3/UL (ref 4–11)
WBC # BLD AUTO: 5.9 10E3/UL (ref 4–11)
WBC # BLD AUTO: 5.9 10E3/UL (ref 4–11)
WBC URINE: 7 /HPF

## 2022-01-01 PROCEDURE — 87636 SARSCOV2 & INF A&B AMP PRB: CPT | Performed by: EMERGENCY MEDICINE

## 2022-01-01 PROCEDURE — 86140 C-REACTIVE PROTEIN: CPT | Performed by: INTERNAL MEDICINE

## 2022-01-01 PROCEDURE — 73080 X-RAY EXAM OF ELBOW: CPT | Mod: LT

## 2022-01-01 PROCEDURE — G1004 CDSM NDSC: HCPCS

## 2022-01-01 PROCEDURE — 250N000013 HC RX MED GY IP 250 OP 250 PS 637: Performed by: INTERNAL MEDICINE

## 2022-01-01 PROCEDURE — 99233 SBSQ HOSP IP/OBS HIGH 50: CPT | Performed by: INTERNAL MEDICINE

## 2022-01-01 PROCEDURE — 36415 COLL VENOUS BLD VENIPUNCTURE: CPT | Performed by: EMERGENCY MEDICINE

## 2022-01-01 PROCEDURE — 250N000013 HC RX MED GY IP 250 OP 250 PS 637: Performed by: PHYSICIAN ASSISTANT

## 2022-01-01 PROCEDURE — 250N000013 HC RX MED GY IP 250 OP 250 PS 637: Performed by: EMERGENCY MEDICINE

## 2022-01-01 PROCEDURE — 99223 1ST HOSP IP/OBS HIGH 75: CPT | Mod: AI | Performed by: PHYSICIAN ASSISTANT

## 2022-01-01 PROCEDURE — 96365 THER/PROPH/DIAG IV INF INIT: CPT

## 2022-01-01 PROCEDURE — 250N000011 HC RX IP 250 OP 636: Performed by: INTERNAL MEDICINE

## 2022-01-01 PROCEDURE — 85610 PROTHROMBIN TIME: CPT | Performed by: INTERNAL MEDICINE

## 2022-01-01 PROCEDURE — 250N000009 HC RX 250: Performed by: INTERNAL MEDICINE

## 2022-01-01 PROCEDURE — 120N000013 HC R&B IMCU

## 2022-01-01 PROCEDURE — 94640 AIRWAY INHALATION TREATMENT: CPT

## 2022-01-01 PROCEDURE — 80048 BASIC METABOLIC PNL TOTAL CA: CPT | Performed by: INTERNAL MEDICINE

## 2022-01-01 PROCEDURE — 36415 COLL VENOUS BLD VENIPUNCTURE: CPT | Performed by: PHYSICIAN ASSISTANT

## 2022-01-01 PROCEDURE — 82803 BLOOD GASES ANY COMBINATION: CPT | Performed by: INTERNAL MEDICINE

## 2022-01-01 PROCEDURE — 93005 ELECTROCARDIOGRAM TRACING: CPT

## 2022-01-01 PROCEDURE — 85610 PROTHROMBIN TIME: CPT | Performed by: EMERGENCY MEDICINE

## 2022-01-01 PROCEDURE — 97530 THERAPEUTIC ACTIVITIES: CPT | Mod: GP

## 2022-01-01 PROCEDURE — 250N000012 HC RX MED GY IP 250 OP 636 PS 637: Performed by: INTERNAL MEDICINE

## 2022-01-01 PROCEDURE — 86901 BLOOD TYPING SEROLOGIC RH(D): CPT | Performed by: INTERNAL MEDICINE

## 2022-01-01 PROCEDURE — 36415 COLL VENOUS BLD VENIPUNCTURE: CPT | Performed by: INTERNAL MEDICINE

## 2022-01-01 PROCEDURE — 96372 THER/PROPH/DIAG INJ SC/IM: CPT

## 2022-01-01 PROCEDURE — 80048 BASIC METABOLIC PNL TOTAL CA: CPT | Performed by: EMERGENCY MEDICINE

## 2022-01-01 PROCEDURE — 85025 COMPLETE CBC W/AUTO DIFF WBC: CPT | Performed by: EMERGENCY MEDICINE

## 2022-01-01 PROCEDURE — 90471 IMMUNIZATION ADMIN: CPT | Performed by: EMERGENCY MEDICINE

## 2022-01-01 PROCEDURE — 120N000001 HC R&B MED SURG/OB

## 2022-01-01 PROCEDURE — 250N000012 HC RX MED GY IP 250 OP 636 PS 637: Performed by: PHYSICIAN ASSISTANT

## 2022-01-01 PROCEDURE — 83036 HEMOGLOBIN GLYCOSYLATED A1C: CPT | Performed by: INTERNAL MEDICINE

## 2022-01-01 PROCEDURE — 72125 CT NECK SPINE W/O DYE: CPT | Mod: MG

## 2022-01-01 PROCEDURE — 85384 FIBRINOGEN ACTIVITY: CPT | Performed by: INTERNAL MEDICINE

## 2022-01-01 PROCEDURE — 97530 THERAPEUTIC ACTIVITIES: CPT | Mod: GP | Performed by: PHYSICAL THERAPIST

## 2022-01-01 PROCEDURE — 86850 RBC ANTIBODY SCREEN: CPT | Performed by: INTERNAL MEDICINE

## 2022-01-01 PROCEDURE — 87186 SC STD MICRODIL/AGAR DIL: CPT | Performed by: EMERGENCY MEDICINE

## 2022-01-01 PROCEDURE — 87077 CULTURE AEROBIC IDENTIFY: CPT | Performed by: EMERGENCY MEDICINE

## 2022-01-01 PROCEDURE — 97116 GAIT TRAINING THERAPY: CPT | Mod: GP | Performed by: PHYSICAL THERAPIST

## 2022-01-01 PROCEDURE — 84484 ASSAY OF TROPONIN QUANT: CPT | Performed by: INTERNAL MEDICINE

## 2022-01-01 PROCEDURE — 85379 FIBRIN DEGRADATION QUANT: CPT | Performed by: INTERNAL MEDICINE

## 2022-01-01 PROCEDURE — 82374 ASSAY BLOOD CARBON DIOXIDE: CPT | Performed by: PHYSICIAN ASSISTANT

## 2022-01-01 PROCEDURE — 71045 X-RAY EXAM CHEST 1 VIEW: CPT

## 2022-01-01 PROCEDURE — 85014 HEMATOCRIT: CPT | Performed by: INTERNAL MEDICINE

## 2022-01-01 PROCEDURE — 85025 COMPLETE CBC W/AUTO DIFF WBC: CPT | Performed by: PHYSICIAN ASSISTANT

## 2022-01-01 PROCEDURE — 86923 COMPATIBILITY TEST ELECTRIC: CPT | Performed by: INTERNAL MEDICINE

## 2022-01-01 PROCEDURE — 258N000003 HC RX IP 258 OP 636: Performed by: INTERNAL MEDICINE

## 2022-01-01 PROCEDURE — 82550 ASSAY OF CK (CPK): CPT | Performed by: INTERNAL MEDICINE

## 2022-01-01 PROCEDURE — 85730 THROMBOPLASTIN TIME PARTIAL: CPT | Performed by: INTERNAL MEDICINE

## 2022-01-01 PROCEDURE — 272N000024 HC KIT POWER PICC DOUBLE LUMEN

## 2022-01-01 PROCEDURE — 258N000003 HC RX IP 258 OP 636: Performed by: EMERGENCY MEDICINE

## 2022-01-01 PROCEDURE — 81001 URINALYSIS AUTO W/SCOPE: CPT | Performed by: EMERGENCY MEDICINE

## 2022-01-01 PROCEDURE — 250N000011 HC RX IP 250 OP 636: Performed by: EMERGENCY MEDICINE

## 2022-01-01 PROCEDURE — 999N000105 HC STATISTIC NO DOCUMENTATION TO SUPPORT CHARGE

## 2022-01-01 PROCEDURE — 84484 ASSAY OF TROPONIN QUANT: CPT | Performed by: EMERGENCY MEDICINE

## 2022-01-01 PROCEDURE — 93970 EXTREMITY STUDY: CPT

## 2022-01-01 PROCEDURE — 85027 COMPLETE CBC AUTOMATED: CPT | Performed by: INTERNAL MEDICINE

## 2022-01-01 PROCEDURE — 87149 DNA/RNA DIRECT PROBE: CPT | Performed by: EMERGENCY MEDICINE

## 2022-01-01 PROCEDURE — 97161 PT EVAL LOW COMPLEX 20 MIN: CPT | Mod: GP

## 2022-01-01 PROCEDURE — 85610 PROTHROMBIN TIME: CPT | Performed by: PHYSICIAN ASSISTANT

## 2022-01-01 PROCEDURE — 83615 LACTATE (LD) (LDH) ENZYME: CPT | Performed by: INTERNAL MEDICINE

## 2022-01-01 PROCEDURE — 73090 X-RAY EXAM OF FOREARM: CPT | Mod: LT

## 2022-01-01 PROCEDURE — 82310 ASSAY OF CALCIUM: CPT | Performed by: INTERNAL MEDICINE

## 2022-01-01 PROCEDURE — 36569 INSJ PICC 5 YR+ W/O IMAGING: CPT

## 2022-01-01 PROCEDURE — 83605 ASSAY OF LACTIC ACID: CPT | Performed by: EMERGENCY MEDICINE

## 2022-01-01 PROCEDURE — 99291 CRITICAL CARE FIRST HOUR: CPT | Performed by: INTERNAL MEDICINE

## 2022-01-01 PROCEDURE — 97110 THERAPEUTIC EXERCISES: CPT | Mod: GP | Performed by: PHYSICAL THERAPIST

## 2022-01-01 PROCEDURE — 71275 CT ANGIOGRAPHY CHEST: CPT | Mod: ME

## 2022-01-01 PROCEDURE — 83605 ASSAY OF LACTIC ACID: CPT | Performed by: INTERNAL MEDICINE

## 2022-01-01 PROCEDURE — 84132 ASSAY OF SERUM POTASSIUM: CPT | Performed by: INTERNAL MEDICINE

## 2022-01-01 PROCEDURE — 73502 X-RAY EXAM HIP UNI 2-3 VIEWS: CPT

## 2022-01-01 PROCEDURE — C9803 HOPD COVID-19 SPEC COLLECT: HCPCS

## 2022-01-01 PROCEDURE — 90715 TDAP VACCINE 7 YRS/> IM: CPT | Performed by: EMERGENCY MEDICINE

## 2022-01-01 PROCEDURE — 999N000157 HC STATISTIC RCP TIME EA 10 MIN

## 2022-01-01 PROCEDURE — 99285 EMERGENCY DEPT VISIT HI MDM: CPT | Mod: 25

## 2022-01-01 PROCEDURE — 250N000011 HC RX IP 250 OP 636: Performed by: PHYSICIAN ASSISTANT

## 2022-01-01 RX ORDER — ACETAMINOPHEN 500 MG
500 TABLET ORAL ONCE
Status: COMPLETED | OUTPATIENT
Start: 2022-01-01 | End: 2022-01-01

## 2022-01-01 RX ORDER — ACETAMINOPHEN 325 MG/1
650 TABLET ORAL EVERY 4 HOURS PRN
Status: DISCONTINUED | OUTPATIENT
Start: 2022-01-01 | End: 2022-01-15 | Stop reason: HOSPADM

## 2022-01-01 RX ORDER — AMIODARONE HYDROCHLORIDE 200 MG/1
200 TABLET ORAL DAILY
Status: DISCONTINUED | OUTPATIENT
Start: 2022-01-01 | End: 2022-01-15 | Stop reason: HOSPADM

## 2022-01-01 RX ORDER — TRAMADOL HYDROCHLORIDE 50 MG/1
50 TABLET ORAL ONCE
Status: COMPLETED | OUTPATIENT
Start: 2022-01-01 | End: 2022-01-01

## 2022-01-01 RX ORDER — WARFARIN SODIUM 2.5 MG/1
2.5 TABLET ORAL
Status: COMPLETED | OUTPATIENT
Start: 2022-01-01 | End: 2022-01-01

## 2022-01-01 RX ORDER — IOPAMIDOL 755 MG/ML
500 INJECTION, SOLUTION INTRAVASCULAR ONCE
Status: COMPLETED | OUTPATIENT
Start: 2022-01-01 | End: 2022-01-01

## 2022-01-01 RX ORDER — CEFTRIAXONE 1 G/1
1 INJECTION, POWDER, FOR SOLUTION INTRAMUSCULAR; INTRAVENOUS EVERY 24 HOURS
Status: DISCONTINUED | OUTPATIENT
Start: 2022-01-01 | End: 2022-01-15 | Stop reason: HOSPADM

## 2022-01-01 RX ORDER — TIZANIDINE 2 MG/1
2 TABLET ORAL EVERY 6 HOURS PRN
Status: DISCONTINUED | OUTPATIENT
Start: 2022-01-01 | End: 2022-01-15 | Stop reason: HOSPADM

## 2022-01-01 RX ORDER — WARFARIN SODIUM 4 MG/1
4 TABLET ORAL
Status: COMPLETED | OUTPATIENT
Start: 2022-01-01 | End: 2022-01-01

## 2022-01-01 RX ORDER — SODIUM CHLORIDE 9 MG/ML
INJECTION, SOLUTION INTRAVENOUS CONTINUOUS
Status: DISCONTINUED | OUTPATIENT
Start: 2022-01-01 | End: 2022-01-01

## 2022-01-01 RX ORDER — ONDANSETRON 4 MG/1
4 TABLET, ORALLY DISINTEGRATING ORAL EVERY 6 HOURS PRN
Status: DISCONTINUED | OUTPATIENT
Start: 2022-01-01 | End: 2022-01-15 | Stop reason: HOSPADM

## 2022-01-01 RX ORDER — ONDANSETRON 2 MG/ML
4 INJECTION INTRAMUSCULAR; INTRAVENOUS EVERY 6 HOURS PRN
Status: DISCONTINUED | OUTPATIENT
Start: 2022-01-01 | End: 2022-01-15 | Stop reason: HOSPADM

## 2022-01-01 RX ORDER — HYDROXYZINE HYDROCHLORIDE 25 MG/1
25 TABLET, FILM COATED ORAL EVERY 6 HOURS PRN
Status: DISCONTINUED | OUTPATIENT
Start: 2022-01-01 | End: 2022-01-15 | Stop reason: HOSPADM

## 2022-01-01 RX ORDER — DEXTROSE MONOHYDRATE 100 MG/ML
INJECTION, SOLUTION INTRAVENOUS CONTINUOUS PRN
Status: DISCONTINUED | OUTPATIENT
Start: 2022-01-01 | End: 2022-01-15 | Stop reason: HOSPADM

## 2022-01-01 RX ORDER — NICOTINE POLACRILEX 4 MG
15-30 LOZENGE BUCCAL
Status: DISCONTINUED | OUTPATIENT
Start: 2022-01-01 | End: 2022-01-01

## 2022-01-01 RX ORDER — ACETAMINOPHEN 325 MG/1
975 TABLET ORAL EVERY 8 HOURS
Status: DISCONTINUED | OUTPATIENT
Start: 2022-01-01 | End: 2022-01-15 | Stop reason: HOSPADM

## 2022-01-01 RX ORDER — DEXTROSE MONOHYDRATE 25 G/50ML
25-50 INJECTION, SOLUTION INTRAVENOUS
Status: DISCONTINUED | OUTPATIENT
Start: 2022-01-01 | End: 2022-01-15 | Stop reason: HOSPADM

## 2022-01-01 RX ORDER — METOPROLOL SUCCINATE 50 MG/1
50 TABLET, EXTENDED RELEASE ORAL DAILY
Status: DISCONTINUED | OUTPATIENT
Start: 2022-01-01 | End: 2022-01-15 | Stop reason: HOSPADM

## 2022-01-01 RX ORDER — LIDOCAINE 40 MG/G
CREAM TOPICAL
Status: DISCONTINUED | OUTPATIENT
Start: 2022-01-01 | End: 2022-01-15 | Stop reason: HOSPADM

## 2022-01-01 RX ORDER — DEXTROSE MONOHYDRATE 25 G/50ML
25-50 INJECTION, SOLUTION INTRAVENOUS
Status: DISCONTINUED | OUTPATIENT
Start: 2022-01-01 | End: 2022-01-01

## 2022-01-01 RX ORDER — LISINOPRIL AND HYDROCHLOROTHIAZIDE 12.5; 2 MG/1; MG/1
2 TABLET ORAL DAILY
Status: DISCONTINUED | OUTPATIENT
Start: 2022-01-01 | End: 2022-01-15 | Stop reason: HOSPADM

## 2022-01-01 RX ORDER — VANCOMYCIN HYDROCHLORIDE 1 G/200ML
1000 INJECTION, SOLUTION INTRAVENOUS EVERY 24 HOURS
Status: DISCONTINUED | OUTPATIENT
Start: 2022-01-15 | End: 2022-01-15 | Stop reason: HOSPADM

## 2022-01-01 RX ORDER — POTASSIUM CHLORIDE 1500 MG/1
40 TABLET, EXTENDED RELEASE ORAL ONCE
Status: COMPLETED | OUTPATIENT
Start: 2022-01-01 | End: 2022-01-01

## 2022-01-01 RX ORDER — ALBUTEROL SULFATE 90 UG/1
1-2 AEROSOL, METERED RESPIRATORY (INHALATION) EVERY 4 HOURS PRN
COMMUNITY

## 2022-01-01 RX ORDER — ACETAMINOPHEN 650 MG/1
650 SUPPOSITORY RECTAL EVERY 4 HOURS PRN
Status: DISCONTINUED | OUTPATIENT
Start: 2022-01-01 | End: 2022-01-15 | Stop reason: HOSPADM

## 2022-01-01 RX ORDER — ALBUTEROL SULFATE 90 UG/1
1-2 AEROSOL, METERED RESPIRATORY (INHALATION) EVERY 4 HOURS PRN
Status: DISCONTINUED | OUTPATIENT
Start: 2022-01-01 | End: 2022-01-15 | Stop reason: HOSPADM

## 2022-01-01 RX ORDER — ATORVASTATIN CALCIUM 40 MG/1
40 TABLET, FILM COATED ORAL AT BEDTIME
Status: DISCONTINUED | OUTPATIENT
Start: 2022-01-01 | End: 2022-01-15 | Stop reason: HOSPADM

## 2022-01-01 RX ORDER — METFORMIN HCL 500 MG
500 TABLET, EXTENDED RELEASE 24 HR ORAL 2 TIMES DAILY WITH MEALS
COMMUNITY

## 2022-01-01 RX ORDER — LIDOCAINE 40 MG/G
CREAM TOPICAL
Status: DISCONTINUED | OUTPATIENT
Start: 2022-01-01 | End: 2022-01-01

## 2022-01-01 RX ORDER — AMOXICILLIN 250 MG
2 CAPSULE ORAL 2 TIMES DAILY PRN
Status: DISCONTINUED | OUTPATIENT
Start: 2022-01-01 | End: 2022-01-15 | Stop reason: HOSPADM

## 2022-01-01 RX ORDER — LISINOPRIL 20 MG/1
20 TABLET ORAL DAILY
Status: DISCONTINUED | OUTPATIENT
Start: 2022-01-01 | End: 2022-01-01

## 2022-01-01 RX ORDER — AMOXICILLIN 250 MG
1 CAPSULE ORAL 2 TIMES DAILY PRN
Status: DISCONTINUED | OUTPATIENT
Start: 2022-01-01 | End: 2022-01-15 | Stop reason: HOSPADM

## 2022-01-01 RX ORDER — GLIPIZIDE 5 MG/1
10 TABLET ORAL
Status: DISCONTINUED | OUTPATIENT
Start: 2022-01-01 | End: 2022-01-15 | Stop reason: HOSPADM

## 2022-01-01 RX ORDER — NICOTINE POLACRILEX 4 MG
15-30 LOZENGE BUCCAL
Status: DISCONTINUED | OUTPATIENT
Start: 2022-01-01 | End: 2022-01-15 | Stop reason: HOSPADM

## 2022-01-01 RX ORDER — AMLODIPINE BESYLATE 10 MG/1
10 TABLET ORAL DAILY
Status: DISCONTINUED | OUTPATIENT
Start: 2022-01-01 | End: 2022-01-15 | Stop reason: HOSPADM

## 2022-01-01 RX ADMIN — WARFARIN SODIUM 2.5 MG: 2.5 TABLET ORAL at 17:54

## 2022-01-01 RX ADMIN — CEFTRIAXONE 1 G: 1 INJECTION, POWDER, FOR SOLUTION INTRAMUSCULAR; INTRAVENOUS at 16:32

## 2022-01-01 RX ADMIN — ACETAMINOPHEN 975 MG: 325 TABLET, FILM COATED ORAL at 00:55

## 2022-01-01 RX ADMIN — SODIUM CHLORIDE 500 ML: 9 INJECTION, SOLUTION INTRAVENOUS at 11:34

## 2022-01-01 RX ADMIN — CEFTRIAXONE 1 G: 1 INJECTION, POWDER, FOR SOLUTION INTRAMUSCULAR; INTRAVENOUS at 16:02

## 2022-01-01 RX ADMIN — INSULIN ASPART 10 UNITS: 100 INJECTION, SOLUTION INTRAVENOUS; SUBCUTANEOUS at 19:39

## 2022-01-01 RX ADMIN — DICLOFENAC SODIUM 2 G: 10 GEL TOPICAL at 07:42

## 2022-01-01 RX ADMIN — TIZANIDINE 2 MG: 2 TABLET ORAL at 02:22

## 2022-01-01 RX ADMIN — CEFTRIAXONE 1 G: 1 INJECTION, POWDER, FOR SOLUTION INTRAMUSCULAR; INTRAVENOUS at 16:05

## 2022-01-01 RX ADMIN — FLUTICASONE FUROATE AND VILANTEROL TRIFENATATE 1 PUFF: 200; 25 POWDER RESPIRATORY (INHALATION) at 10:13

## 2022-01-01 RX ADMIN — INSULIN ASPART 4 UNITS: 100 INJECTION, SOLUTION INTRAVENOUS; SUBCUTANEOUS at 12:51

## 2022-01-01 RX ADMIN — DEXAMETHASONE 6 MG: 2 TABLET ORAL at 13:24

## 2022-01-01 RX ADMIN — EPINEPHRINE 1 MG: 0.1 INJECTION, SOLUTION ENDOTRACHEAL; INTRACARDIAC; INTRAVENOUS at 23:32

## 2022-01-01 RX ADMIN — FLUTICASONE FUROATE AND VILANTEROL TRIFENATATE 1 PUFF: 200; 25 POWDER RESPIRATORY (INHALATION) at 10:10

## 2022-01-01 RX ADMIN — METOPROLOL SUCCINATE 50 MG: 50 TABLET, EXTENDED RELEASE ORAL at 19:28

## 2022-01-01 RX ADMIN — TRAMADOL HYDROCHLORIDE 50 MG: 50 TABLET ORAL at 00:54

## 2022-01-01 RX ADMIN — TRAMADOL HYDROCHLORIDE 50 MG: 50 TABLET ORAL at 01:37

## 2022-01-01 RX ADMIN — ATORVASTATIN CALCIUM 40 MG: 40 TABLET, FILM COATED ORAL at 22:18

## 2022-01-01 RX ADMIN — DICLOFENAC SODIUM 2 G: 10 GEL TOPICAL at 01:00

## 2022-01-01 RX ADMIN — Medication 5.5 UNITS/HR: at 22:44

## 2022-01-01 RX ADMIN — SODIUM CHLORIDE, POTASSIUM CHLORIDE, SODIUM LACTATE AND CALCIUM CHLORIDE 500 ML: 600; 310; 30; 20 INJECTION, SOLUTION INTRAVENOUS at 12:24

## 2022-01-01 RX ADMIN — LISINOPRIL 20 MG: 20 TABLET ORAL at 08:41

## 2022-01-01 RX ADMIN — AMIODARONE HYDROCHLORIDE 200 MG: 200 TABLET ORAL at 09:59

## 2022-01-01 RX ADMIN — DEXAMETHASONE 6 MG: 2 TABLET ORAL at 17:53

## 2022-01-01 RX ADMIN — TRAMADOL HYDROCHLORIDE 25 MG: 50 TABLET ORAL at 03:44

## 2022-01-01 RX ADMIN — AMIODARONE HYDROCHLORIDE 200 MG: 200 TABLET ORAL at 10:13

## 2022-01-01 RX ADMIN — ENOXAPARIN SODIUM 90 MG: 100 INJECTION SUBCUTANEOUS at 17:58

## 2022-01-01 RX ADMIN — HYDROXYZINE HYDROCHLORIDE 25 MG: 25 TABLET, FILM COATED ORAL at 19:28

## 2022-01-01 RX ADMIN — AMLODIPINE BESYLATE 10 MG: 10 TABLET ORAL at 08:02

## 2022-01-01 RX ADMIN — ATORVASTATIN CALCIUM 40 MG: 40 TABLET, FILM COATED ORAL at 22:24

## 2022-01-01 RX ADMIN — TRAMADOL HYDROCHLORIDE 25 MG: 50 TABLET ORAL at 22:57

## 2022-01-01 RX ADMIN — EPINEPHRINE 1 MG: 0.1 INJECTION, SOLUTION ENDOTRACHEAL; INTRACARDIAC; INTRAVENOUS at 23:36

## 2022-01-01 RX ADMIN — FLUTICASONE FUROATE AND VILANTEROL TRIFENATATE 1 PUFF: 200; 25 POWDER RESPIRATORY (INHALATION) at 08:04

## 2022-01-01 RX ADMIN — GLIPIZIDE 10 MG: 10 TABLET ORAL at 16:31

## 2022-01-01 RX ADMIN — EPINEPHRINE 1 MG: 0.1 INJECTION, SOLUTION ENDOTRACHEAL; INTRACARDIAC; INTRAVENOUS at 23:38

## 2022-01-01 RX ADMIN — GLIPIZIDE 10 MG: 10 TABLET ORAL at 16:02

## 2022-01-01 RX ADMIN — DICLOFENAC SODIUM 2 G: 10 GEL TOPICAL at 19:59

## 2022-01-01 RX ADMIN — EPINEPHRINE 1 MG: 0.1 INJECTION, SOLUTION ENDOTRACHEAL; INTRACARDIAC; INTRAVENOUS at 23:45

## 2022-01-01 RX ADMIN — DICLOFENAC SODIUM 2 G: 10 GEL TOPICAL at 02:22

## 2022-01-01 RX ADMIN — AMIODARONE HYDROCHLORIDE 200 MG: 200 TABLET ORAL at 18:16

## 2022-01-01 RX ADMIN — SODIUM CHLORIDE 1000 ML: 9 INJECTION, SOLUTION INTRAVENOUS at 21:14

## 2022-01-01 RX ADMIN — GLIPIZIDE 10 MG: 10 TABLET ORAL at 09:59

## 2022-01-01 RX ADMIN — SODIUM CHLORIDE 84 ML: 9 INJECTION, SOLUTION INTRAVENOUS at 11:44

## 2022-01-01 RX ADMIN — LISINOPRIL 20 MG: 20 TABLET ORAL at 19:28

## 2022-01-01 RX ADMIN — INSULIN GLARGINE 20 UNITS: 100 INJECTION, SOLUTION SUBCUTANEOUS at 23:18

## 2022-01-01 RX ADMIN — ACETAMINOPHEN 975 MG: 325 TABLET, FILM COATED ORAL at 16:31

## 2022-01-01 RX ADMIN — IOPAMIDOL 65 ML: 755 INJECTION, SOLUTION INTRAVENOUS at 11:44

## 2022-01-01 RX ADMIN — TRAMADOL HYDROCHLORIDE 50 MG: 50 TABLET ORAL at 18:03

## 2022-01-01 RX ADMIN — ENOXAPARIN SODIUM 90 MG: 100 INJECTION SUBCUTANEOUS at 06:01

## 2022-01-01 RX ADMIN — INSULIN ASPART 5 UNITS: 100 INJECTION, SOLUTION INTRAVENOUS; SUBCUTANEOUS at 09:09

## 2022-01-01 RX ADMIN — ENOXAPARIN SODIUM 90 MG: 100 INJECTION SUBCUTANEOUS at 05:55

## 2022-01-01 RX ADMIN — INSULIN ASPART 4 UNITS: 100 INJECTION, SOLUTION INTRAVENOUS; SUBCUTANEOUS at 10:10

## 2022-01-01 RX ADMIN — WARFARIN SODIUM 4 MG: 4 TABLET ORAL at 17:59

## 2022-01-01 RX ADMIN — ACETAMINOPHEN 975 MG: 325 TABLET, FILM COATED ORAL at 22:18

## 2022-01-01 RX ADMIN — ENOXAPARIN SODIUM 90 MG: 100 INJECTION SUBCUTANEOUS at 18:04

## 2022-01-01 RX ADMIN — INSULIN ASPART 5 UNITS: 100 INJECTION, SOLUTION INTRAVENOUS; SUBCUTANEOUS at 17:20

## 2022-01-01 RX ADMIN — TRAMADOL HYDROCHLORIDE 50 MG: 50 TABLET, FILM COATED ORAL at 09:55

## 2022-01-01 RX ADMIN — AMLODIPINE BESYLATE 10 MG: 10 TABLET ORAL at 08:41

## 2022-01-01 RX ADMIN — INSULIN ASPART 2 UNITS: 100 INJECTION, SOLUTION INTRAVENOUS; SUBCUTANEOUS at 13:13

## 2022-01-01 RX ADMIN — GLIPIZIDE 10 MG: 10 TABLET ORAL at 16:00

## 2022-01-01 RX ADMIN — ACETAMINOPHEN 500 MG: 500 TABLET, FILM COATED ORAL at 12:45

## 2022-01-01 RX ADMIN — ACETAMINOPHEN 975 MG: 325 TABLET, FILM COATED ORAL at 08:41

## 2022-01-01 RX ADMIN — POTASSIUM CHLORIDE 40 MEQ: 20 TABLET, EXTENDED RELEASE ORAL at 10:13

## 2022-01-01 RX ADMIN — ACETAMINOPHEN 975 MG: 325 TABLET, FILM COATED ORAL at 21:57

## 2022-01-01 RX ADMIN — GLIPIZIDE 10 MG: 10 TABLET ORAL at 18:17

## 2022-01-01 RX ADMIN — ENOXAPARIN SODIUM 90 MG: 100 INJECTION SUBCUTANEOUS at 17:55

## 2022-01-01 RX ADMIN — ENOXAPARIN SODIUM 90 MG: 100 INJECTION SUBCUTANEOUS at 05:46

## 2022-01-01 RX ADMIN — ATORVASTATIN CALCIUM 40 MG: 40 TABLET, FILM COATED ORAL at 21:10

## 2022-01-01 RX ADMIN — ONDANSETRON 4 MG: 4 TABLET, ORALLY DISINTEGRATING ORAL at 18:04

## 2022-01-01 RX ADMIN — ACETAMINOPHEN 975 MG: 325 TABLET, FILM COATED ORAL at 00:08

## 2022-01-01 RX ADMIN — ACETAMINOPHEN 975 MG: 325 TABLET, FILM COATED ORAL at 17:52

## 2022-01-01 RX ADMIN — CEFTRIAXONE 1 G: 1 INJECTION, POWDER, FOR SOLUTION INTRAMUSCULAR; INTRAVENOUS at 17:56

## 2022-01-01 RX ADMIN — CLOSTRIDIUM TETANI TOXOID ANTIGEN (FORMALDEHYDE INACTIVATED), CORYNEBACTERIUM DIPHTHERIAE TOXOID ANTIGEN (FORMALDEHYDE INACTIVATED), BORDETELLA PERTUSSIS TOXOID ANTIGEN (GLUTARALDEHYDE INACTIVATED), BORDETELLA PERTUSSIS FILAMENTOUS HEMAGGLUTININ ANTIGEN (FORMALDEHYDE INACTIVATED), BORDETELLA PERTUSSIS PERTACTIN ANTIGEN, AND BORDETELLA PERTUSSIS FIMBRIAE 2/3 ANTIGEN 0.5 ML: 5; 2; 2.5; 5; 3; 5 INJECTION, SUSPENSION INTRAMUSCULAR at 11:48

## 2022-01-01 RX ADMIN — ONDANSETRON 4 MG: 2 INJECTION INTRAMUSCULAR; INTRAVENOUS at 08:10

## 2022-01-01 RX ADMIN — ACETAMINOPHEN 975 MG: 325 TABLET, FILM COATED ORAL at 09:59

## 2022-01-01 RX ADMIN — AMIODARONE HYDROCHLORIDE 200 MG: 200 TABLET ORAL at 13:24

## 2022-01-01 RX ADMIN — DICLOFENAC SODIUM 2 G: 10 GEL TOPICAL at 12:16

## 2022-01-01 RX ADMIN — ACETAMINOPHEN 975 MG: 325 TABLET, FILM COATED ORAL at 16:00

## 2022-01-01 RX ADMIN — TAZOBACTAM SODIUM AND PIPERACILLIN SODIUM 3.38 G: 375; 3 INJECTION, SOLUTION INTRAVENOUS at 11:34

## 2022-01-01 RX ADMIN — ATORVASTATIN CALCIUM 40 MG: 40 TABLET, FILM COATED ORAL at 22:32

## 2022-01-01 RX ADMIN — INSULIN ASPART 6 UNITS: 100 INJECTION, SOLUTION INTRAVENOUS; SUBCUTANEOUS at 17:59

## 2022-01-01 RX ADMIN — DEXAMETHASONE 6 MG: 2 TABLET ORAL at 14:03

## 2022-01-01 RX ADMIN — LISINOPRIL AND HYDROCHLOROTHIAZIDE 2 TABLET: 12.5; 2 TABLET ORAL at 09:59

## 2022-01-01 RX ADMIN — LISINOPRIL 20 MG: 20 TABLET ORAL at 08:02

## 2022-01-01 RX ADMIN — INSULIN ASPART 3 UNITS: 100 INJECTION, SOLUTION INTRAVENOUS; SUBCUTANEOUS at 09:01

## 2022-01-01 RX ADMIN — ACETAMINOPHEN 975 MG: 325 TABLET, FILM COATED ORAL at 19:28

## 2022-01-01 RX ADMIN — DICLOFENAC SODIUM 2 G: 10 GEL TOPICAL at 19:28

## 2022-01-01 RX ADMIN — GLIPIZIDE 10 MG: 10 TABLET ORAL at 08:02

## 2022-01-01 RX ADMIN — AMLODIPINE BESYLATE 10 MG: 10 TABLET ORAL at 09:59

## 2022-01-01 RX ADMIN — INSULIN ASPART 4 UNITS: 100 INJECTION, SOLUTION INTRAVENOUS; SUBCUTANEOUS at 14:02

## 2022-01-01 RX ADMIN — ENOXAPARIN SODIUM 90 MG: 100 INJECTION SUBCUTANEOUS at 17:27

## 2022-01-01 RX ADMIN — ACETAMINOPHEN 975 MG: 325 TABLET, FILM COATED ORAL at 12:17

## 2022-01-01 RX ADMIN — FLUTICASONE FUROATE AND VILANTEROL TRIFENATATE 1 PUFF: 200; 25 POWDER RESPIRATORY (INHALATION) at 09:42

## 2022-01-01 RX ADMIN — GLIPIZIDE 10 MG: 10 TABLET ORAL at 18:04

## 2022-01-01 RX ADMIN — DICLOFENAC SODIUM 2 G: 10 GEL TOPICAL at 15:58

## 2022-01-01 RX ADMIN — DEXAMETHASONE 6 MG: 2 TABLET ORAL at 12:52

## 2022-01-01 RX ADMIN — ACETAMINOPHEN 975 MG: 325 TABLET, FILM COATED ORAL at 08:02

## 2022-01-01 RX ADMIN — AMIODARONE HYDROCHLORIDE 200 MG: 200 TABLET ORAL at 08:02

## 2022-01-01 RX ADMIN — EPINEPHRINE 1 MG: 0.1 INJECTION, SOLUTION ENDOTRACHEAL; INTRACARDIAC; INTRAVENOUS at 23:42

## 2022-01-01 RX ADMIN — WARFARIN SODIUM 4 MG: 4 TABLET ORAL at 17:28

## 2022-01-01 ASSESSMENT — ACTIVITIES OF DAILY LIVING (ADL)
ADLS_ACUITY_SCORE: 18
ADLS_ACUITY_SCORE: 22
ADLS_ACUITY_SCORE: 12
ADLS_ACUITY_SCORE: 18
ADLS_ACUITY_SCORE: 20
ADLS_ACUITY_SCORE: 18
ADLS_ACUITY_SCORE: 19
ADLS_ACUITY_SCORE: 22
ADLS_ACUITY_SCORE: 18
DEPENDENT_IADLS:: CLEANING;COOKING;MEAL PREPARATION
ADLS_ACUITY_SCORE: 18
ADLS_ACUITY_SCORE: 13
ADLS_ACUITY_SCORE: 18
ADLS_ACUITY_SCORE: 9
ADLS_ACUITY_SCORE: 10
ADLS_ACUITY_SCORE: 15
ADLS_ACUITY_SCORE: 13
ADLS_ACUITY_SCORE: 12
ADLS_ACUITY_SCORE: 15
ADLS_ACUITY_SCORE: 20
ADLS_ACUITY_SCORE: 18
ADLS_ACUITY_SCORE: 18
ADLS_ACUITY_SCORE: 9
ADLS_ACUITY_SCORE: 9
ADLS_ACUITY_SCORE: 15
ADLS_ACUITY_SCORE: 9
ADLS_ACUITY_SCORE: 15
ADLS_ACUITY_SCORE: 18
ADLS_ACUITY_SCORE: 9
ADLS_ACUITY_SCORE: 18
ADLS_ACUITY_SCORE: 18
ADLS_ACUITY_SCORE: 20
ADLS_ACUITY_SCORE: 9
ADLS_ACUITY_SCORE: 9
ADLS_ACUITY_SCORE: 12
ADLS_ACUITY_SCORE: 19
ADLS_ACUITY_SCORE: 9
ADLS_ACUITY_SCORE: 9
ADLS_ACUITY_SCORE: 15
ADLS_ACUITY_SCORE: 18
ADLS_ACUITY_SCORE: 9
ADLS_ACUITY_SCORE: 18
ADLS_ACUITY_SCORE: 15
ADLS_ACUITY_SCORE: 15
ADLS_ACUITY_SCORE: 9
ADLS_ACUITY_SCORE: 12
ADLS_ACUITY_SCORE: 9
ADLS_ACUITY_SCORE: 13
ADLS_ACUITY_SCORE: 13
ADLS_ACUITY_SCORE: 12
ADLS_ACUITY_SCORE: 20
ADLS_ACUITY_SCORE: 13
ADLS_ACUITY_SCORE: 9
ADLS_ACUITY_SCORE: 18
ADLS_ACUITY_SCORE: 20
ADLS_ACUITY_SCORE: 13
ADLS_ACUITY_SCORE: 18
ADLS_ACUITY_SCORE: 20
ADLS_ACUITY_SCORE: 18
ADLS_ACUITY_SCORE: 15
ADLS_ACUITY_SCORE: 18
ADLS_ACUITY_SCORE: 18
ADLS_ACUITY_SCORE: 9
ADLS_ACUITY_SCORE: 20
ADLS_ACUITY_SCORE: 20
ADLS_ACUITY_SCORE: 18
ADLS_ACUITY_SCORE: 9
ADLS_ACUITY_SCORE: 20
ADLS_ACUITY_SCORE: 20
ADLS_ACUITY_SCORE: 9
ADLS_ACUITY_SCORE: 18
ADLS_ACUITY_SCORE: 12
ADLS_ACUITY_SCORE: 18
ADLS_ACUITY_SCORE: 13
ADLS_ACUITY_SCORE: 9
ADLS_ACUITY_SCORE: 20
ADLS_ACUITY_SCORE: 19
ADLS_ACUITY_SCORE: 9
ADLS_ACUITY_SCORE: 9
ADLS_ACUITY_SCORE: 13
ADLS_ACUITY_SCORE: 13
ADLS_ACUITY_SCORE: 10
ADLS_ACUITY_SCORE: 13
ADLS_ACUITY_SCORE: 9
ADLS_ACUITY_SCORE: 18
ADLS_ACUITY_SCORE: 9
ADLS_ACUITY_SCORE: 9
ADLS_ACUITY_SCORE: 20
ADLS_ACUITY_SCORE: 9
ADLS_ACUITY_SCORE: 20
ADLS_ACUITY_SCORE: 9
ADLS_ACUITY_SCORE: 13
ADLS_ACUITY_SCORE: 12
ADLS_ACUITY_SCORE: 15
ADLS_ACUITY_SCORE: 9
ADLS_ACUITY_SCORE: 18
ADLS_ACUITY_SCORE: 13
ADLS_ACUITY_SCORE: 9
ADLS_ACUITY_SCORE: 20
ADLS_ACUITY_SCORE: 9

## 2022-01-01 ASSESSMENT — ENCOUNTER SYMPTOMS
DYSURIA: 0
ABDOMINAL PAIN: 0
HEADACHES: 1
COUGH: 1
LIGHT-HEADEDNESS: 1
TREMORS: 1
BACK PAIN: 1
FREQUENCY: 0
DIARRHEA: 1
FEVER: 1
NECK PAIN: 1
DIZZINESS: 1
HEMATURIA: 0

## 2022-01-01 ASSESSMENT — MIFFLIN-ST. JEOR: SCORE: 1385.33

## 2022-01-10 PROBLEM — S40.812A ABRASION OF LEFT UPPER EXTREMITY, INITIAL ENCOUNTER: Status: ACTIVE | Noted: 2022-01-01

## 2022-01-10 PROBLEM — M25.552 HIP PAIN, LEFT: Status: ACTIVE | Noted: 2022-01-01

## 2022-01-10 PROBLEM — R65.20 SEVERE SEPSIS (H): Status: ACTIVE | Noted: 2022-01-01

## 2022-01-10 PROBLEM — A41.9 SEVERE SEPSIS (H): Status: ACTIVE | Noted: 2022-01-01

## 2022-01-10 PROBLEM — Z79.01 ANTICOAGULATED ON WARFARIN: Status: ACTIVE | Noted: 2022-01-01
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
## 2022-01-10 PROBLEM — M79.602 PAIN OF LEFT UPPER EXTREMITY: Status: ACTIVE | Noted: 2022-01-01

## 2022-01-10 NOTE — ED NOTES
SW is unable to go into the patient's room due to covid. Patient's number on file is her house phone and her daughter in-law and son answered. Patient does not have a cell phone.     SW will continue to be available for discharge needs as they arise.     RAY Hdz, [facility]  , ED Care Management   [phone]

## 2022-01-10 NOTE — ED NOTES
RECEIVING UNIT ED HANDOFF REVIEW    Above ED Nurse Handoff Report was reviewed: Yes  Reviewed by: Delaney Ardon RN on January 11, 2022 at 3:14 PM   [facility]  ED Nurse Handoff Report    Ruthy Becerril is a 81 year old female   ED Chief complaint: Fall  . ED Diagnosis:   Final diagnoses:   Severe sepsis (H) - presumed, likely UTI   Hip pain, left   Pain of left upper extremity - contusion   Abrasion of left upper extremity, initial encounter   Anticoagulated on warfarin   Fall, initial encounter     Allergies:   Allergies   Allergen Reactions     Oxycodone-Acetaminophen Rash       Code Status: Full Code  Activity level - Baseline/Home:  Assist X 1. Activity Level - Current:   Assist X 2. Lift room needed: Yes. Bariatric: Yes   Needed: No   Isolation: No. Infection: Not Applicable.     Vital Signs:   Vitals:    01/10/22 1130 01/10/22 1145 01/10/22 1200 01/10/22 1215   BP: (!) 150/65 (!) 144/69 (!) 160/68 (!) 151/64   Pulse: 78 75 75 73   Resp: 24 15 18 19   Temp:       TempSrc:       SpO2: 93% 94% 94% 93%       Cardiac Rhythm:  ,      Pain level:    Patient confused: No. Patient Falls Risk: Yes.   Elimination Status: Has voided   Patient Report - Initial Complaint: Fall. Focused Assessment:    Musculoskeletal - Musculoskeletal WDL: .WDL except  General Mobility: generalized weakness; mildly impaired  LUE Extremity Movement: active ROM moderately impaired  LLE Extremity Movement: active ROM moderately impaired  LS     10:55 Neurological [place] Coma Scale - Best Eye Response: 4-->(E4) spontaneous  Best Motor Response: 6-->(M6) obeys commands  Best Verbal Response: 5-->(V5) oriented  [place] Coma Scale Score: 15        Tests Performed: labs and imaging. Abnormal Results:   Labs Ordered and Resulted from Time of ED Arrival to Time of ED Departure   BASIC METABOLIC PANEL - Abnormal       Result Value    Sodium 139      Potassium 3.6      Chloride 102      Carbon Dioxide (CO2) 30      Anion Gap 7       Urea Nitrogen 18      Creatinine 0.74      Calcium 9.7      Glucose 173 (*)     GFR Estimate 82     INR - Abnormal    INR 2.66 (*)    LACTIC ACID WHOLE BLOOD - Abnormal    Lactic Acid 3.5 (*)    ROUTINE UA WITH MICROSCOPIC REFLEX TO CULTURE - Abnormal    Color Urine Light Yellow      Appearance Urine Slightly Cloudy (*)     Glucose Urine 100  (*)     Bilirubin Urine Negative      Ketones Urine Negative      Specific Gravity Urine 1.018      Blood Urine Small (*)     pH Urine 5.5      Protein Albumin Urine 50  (*)     Urobilinogen Urine Normal      Nitrite Urine Positive (*)     Leukocyte Esterase Urine Negative      Bacteria Urine Few (*)     Mucus Urine Present (*)     RBC Urine 2      WBC Urine 7 (*)     Squamous Epithelials Urine 1      Hyaline Casts Urine 4 (*)    CBC WITH PLATELETS AND DIFFERENTIAL - Abnormal    WBC Count 11.7 (*)     RBC Count 4.88      Hemoglobin 13.5      Hematocrit 43.9      MCV 90      MCH 27.7      MCHC 30.8 (*)     RDW 13.4      Platelet Count 284      % Neutrophils 88      % Lymphocytes 4      % Monocytes 6      % Eosinophils 0      % Basophils 1      % Immature Granulocytes 1      NRBCs per 100 WBC 0      Absolute Neutrophils 10.2 (*)     Absolute Lymphocytes 0.5 (*)     Absolute Monocytes 0.8      Absolute Eosinophils 0.1      Absolute Basophils 0.1      Absolute Immature Granulocytes 0.1      Absolute NRBCs 0.0     TROPONIN I - Normal    Troponin I High Sensitivity 8     INFLUENZA A/B & SARS-COV2 PCR MULTIPLEX   BLOOD CULTURE   BLOOD CULTURE   URINE CULTURE     XR Chest Port 1 View   Preliminary Result   IMPRESSION: No acute disease.      Radius/Ulna XR,  PA &LAT, left   Final Result   IMPRESSION:   1.  No fracture or joint malalignment.   2.  Advanced thumb CMC degenerative arthrosis.   3.  Tiny olecranon enthesophyte.          GERALD RODRIGUEZ MD            SYSTEM ID:  [id number]      Elbow  XR, G/E 3 views, left   Final Result   IMPRESSION:   1.  Normal joint spacing and  alignment.   2.  No fracture or joint effusion.   3.  Tiny olecranon enthesophyte.      GERALD RODRIGUEZ MD            SYSTEM ID:  [id number]      XR Pelvis and Hip Left 1 View   Final Result   IMPRESSION:   1.  Oblique lucency projecting over the left subcapital femoral neck.   This was present on the comparison radiograph (9-2-21), and is   slightly less conspicuous since that time. This finding likely   represents artifact (from overlapping osteophytes) or sequelae of old   healing fracture. If there is high clinical concern for acute hip   fracture, left hip CT is recommended in further evaluation.   2.  Advanced left hip degenerative arthrosis, unchanged.   3.  No joint malalignment.   4.  Degenerative changes in the lower lumbar spine.   5.  Right total hip arthroplasty.   6.  Bone demineralization.   7.  Atherosclerotic calcification.      GERALD RODRIGUEZ MD            SYSTEM ID:  [id number]      CT Head w/o Contrast   Final Result   IMPRESSION:      1. No evidence of acute intracranial hemorrhage, mass, or herniation.   2. Mild nonspecific white matter changes likely due to chronic   microvascular ischemic disease.       JOE BRAUN MD            SYSTEM ID:  [id number]      CT Cervical Spine w/o Contrast   Final Result   IMPRESSION:    1. Images are mildly degraded by motion artifact which slightly limits   evaluation, particularly at the mid cervical spine.   2. No evidence of acute fracture noting mild limitations by motion.   3. Multilevel degenerative changes in the cervical spine as detailed   above.      Results including limitations of the study were discussed with   Rosetta Grady at 10:32 AM on 1/10/2022.       JOE BRAUN MD            SYSTEM ID:  [id number]      CT Hip Left w/o Contrast    (Results Pending)   .   Treatments provided: See MAR  Family Comments: None at bedside  OBS brochure/video discussed/provided to patient:  No  ED Medications:   Medications   lidocaine 1 % 0.1-1 mL (has no  administration in time range)   lidocaine (LMX4) cream (has no administration in time range)   sodium chloride (PF) 0.9% PF flush 3 mL (3 mLs Intracatheter Not Given 1/10/22 0959)   sodium chloride (PF) 0.9% PF flush 3 mL (has no administration in time range)   traMADol (ULTRAM) tablet 50 mg (50 mg Oral Given 1/10/22 0955)   piperacillin-tazobactam (ZOSYN) infusion 3.375 g (3.375 g Intravenous New Bag 1/10/22 1134)   0.9% sodium chloride BOLUS (500 mLs Intravenous New Bag 1/10/22 1134)   Tdap (tetanus-diphtheria-acell pertussis) (ADACEL) injection 0.5 mL (0.5 mLs Intramuscular Given 1/10/22 1148)     Drips infusing:  No  For the majority of the shift, the patient's behavior Green. Interventions performed were N/A.    Sepsis treatment initiated: No     Patient tested for COVID 19 prior to admission: YES    ED Nurse Name/Phone Number: Tanvi Howell RN,   12:30 PM

## 2022-01-10 NOTE — ED NOTES
Care Management Initial Consult    General Information  Assessment completed with: Patient, Patient   Type of CM/SW Visit: Initial Assessment    Primary Care Provider verified and updated as needed: Yes   Readmission within the last 30 days: no previous admission in last 30 days      Reason for Consult: discharge planning  Advance Care Planning:            Communication Assessment  Patient's communication style: spoken language (English or Bilingual)             Cognitive  Cognitive/Neuro/Behavioral: WDL                      Living Environment:   People in home: child(ilir), adult     Current living Arrangements: house      Able to return to prior arrangements: other (see comments) (TBD)       Family/Social Support:  Care provided by: self  Provides care for: no one     Children          Description of Support System: Supportive    Support Assessment: Adequate family and caregiver support,Patient communicates needs well met    Current Resources:   Patient receiving home care services: No     Community Resources: None  Equipment currently used at home: walker, rolling  Supplies currently used at home:      Employment/Financial:  Employment Status:          Financial Concerns: No concerns identified   Referral to Financial Counselor: No       Lifestyle & Psychosocial Needs:  Social Determinants of Health     Tobacco Use: Not on file   Alcohol Use: Not on file   Financial Resource Strain: Not on file   Food Insecurity: Not on file   Transportation Needs: Not on file   Physical Activity: Not on file   Stress: Not on file   Social Connections: Not on file   Intimate Partner Violence: Not on file   Depression: Not on file   Housing Stability: Not on file       Functional Status:  Prior to admission patient needed assistance:   Dependent ADLs:: Independent,Ambulation-walker  Dependent IADLs:: Cleaning,Cooking,Meal Preparation  Assesssment of Functional Status: Needs assistance with laundry/houskeeping,Needs assistance with  meals    Mental Health Status:  Mental Health Status: No Current Concerns       Chemical Dependency Status:  Chemical Dependency Status: No Current Concerns             Values/Beliefs:  Spiritual, Cultural Beliefs, Sikh Practices, Values that affect care:                 Additional Information:  Patient received a call from this patient. SW was unable to go into the patient's room due to covid.     Patient lives in a house with stairs with her son and daughter in-law. Patient uses a walker and had a fall today. She has a walk in shower and does not use grab bars.     Patient gets help with cooking and cleaning. She is able to groom and bath on her own. Patient does not have any community resouces.     Patient has had home care in the past and believes it was through [facility]. Patient has been to TCU in the past as well. SW discussed that PT will likely evaluate her to determine recommendations. SW explained insurance coverage at [facility].     Patient is unsure about transportation at this time. Her son might be able to transport if he is not working.     SW/CC team will continue to follow.     Aimee Moreno, RAY, [facility]  , ED Care Management   [phone]    Aimee Moreno

## 2022-01-10 NOTE — CONSULTS
Care Management  Note      Additional Information:  See ED note for SW consult.     RAY Hdz, [facility]  , ED Care Management   [phone]

## 2022-01-10 NOTE — PHARMACY-ADMISSION MEDICATION HISTORY
Admission medication history interview status for this patient is complete. See [facility] admission navigator for allergy information, prior to admission medications and immunization status.     Medication history interview done, indicate source(s): Patient via phone  Medication history resources (including written lists, pill bottles, clinic record): [facility] dispense records (a little outdated), [facility] (current)    Changes made to PTA medication list:  Added: albuterol, dulera  Changed: tresiba --> lantus, diclofenac gel --> PRN, metformin --> ER formulation, miconazole --> PRN, updated warfarin dose  Reported as Not Taking: none  Removed:  amiodarone load    Actions taken by pharmacist (provider contacted, etc):None     Additional medication history information:None    Medication reconciliation/reorder completed by provider prior to medication history?  N   (Y/N)     For patients on insulin therapy:   Do you use sliding scale insulin based on blood sugars? N  What is your pre-meal insulin coverage?  None - uses glipizide/metformin  Do you typically eat three meals a day? y  How many times do you check your blood glucose per day? ?  How many episodes of hypoglycemia do you typically have per month? -  Do you have a Continuous Glucose Monitor (CGM)?  N    Prior to Admission medications    Medication Sig Last Dose Taking? Auth Provider   albuterol (PROAIR HFA/PROVENTIL HFA/VENTOLIN HFA) 108 (90 Base) MCG/ACT inhaler Inhale 1-2 puffs into the lungs every 4 hours as needed  Past Month at Unknown time Yes Unknown, Entered By History   amiodarone (PACERONE) 200 MG tablet Take 1 tablet (200 mg) by mouth daily 2022 at am Yes Rosetta Noble PA-C   amLODIPine (NORVASC) 10 MG tablet Take 10 mg by mouth daily 2022 at am Yes Unknown, Entered By History   atorvastatin (LIPITOR) 40 MG tablet Take 40 mg by mouth At Bedtime  2022 at pm Yes Unknown, Entered By History   diclofenac (VOLTAREN) 1 %  topical gel Apply 2 g topically 4 times daily  Patient taking differently: Apply 2 g topically 4 times daily as needed  Unknown at Unknown time Yes Rosetta Noble PA-C   glipiZIDE (GLUCOTROL) 10 MG tablet Take 10 mg by mouth 2 times daily (before meals) 1/9/2022 at pm Yes Unknown, Entered By History   insulin glargine (LANTUS PEN) 100 UNIT/ML pen Inject 50 Units Subcutaneous At Bedtime 1/9/2022 at pm Yes Unknown, Entered By History   lisinopril-hydrochlorothiazide (ZESTORETIC) 20-12.5 MG tablet Take 2 tablets by mouth daily 1/9/2022 at am Yes Unknown, Entered By History   metFORMIN (GLUCOPHAGE-XR) 500 MG 24 hr tablet Take 500 mg by mouth 2 times daily (with meals) 1/9/2022 at pm Yes Unknown, Entered By History   metoprolol succinate ER (TOPROL-XL) 50 MG 24 hr tablet Take 1 tablet (50 mg) by mouth daily 1/9/2022 at am Yes Rosetta Noble PA-C   miconazole (MICATIN) 2 % external powder Apply topically 2 times daily  Patient taking differently: Apply topically 2 times daily as needed  Unknown at Unknown time Yes Rosetta Noble PA-C   mometasone-formoterol (DULERA) 200-5 MCG/ACT inhaler Inhale 2 puffs into the lungs 2 times daily 1/9/2022 at pm Yes Unknown, Entered By History   traMADol (ULTRAM) 50 MG tablet Take 0.5 tablets (25 mg) by mouth every 6 hours as needed for moderate pain Past Week at Unknown time Yes Rosetta Noble PA-C   warfarin ANTICOAGULANT (COUMADIN) 1 MG tablet Take 2.5 tablets (2.5 mg) by mouth daily TAKE 2.5 mg warfarin tonight (9/7) then as directed from Pharmacy  Patient taking differently: Take by mouth daily Take 2mg on Mon-Wed-Fri & 2.5mg on Sun-Tues-Thurs-Sat 1/9/2022 at AM Yes Rosetta Noble PA-C

## 2022-01-10 NOTE — ED TRIAGE NOTES
Patient arrived via EMS from home.  She had a fall in her bathroom and was found with her left arm tangled in her walker.  She was discharged from rehab about 1 month ago.  She has chronic leg pain at baseline.  She denies hitting her head.  History of A fib.  ABCs intact.  A&O x4.

## 2022-01-10 NOTE — H&P
[facility]    Hospitalist History and Physical    Name: Ruthy Becerril    MRN: [id number]  YOB: 1940    Age: 81 year old  Date of Admission:  1/10/2022  Date of Service (when I saw the patient): 01/10/22    Assessment & Plan   Ruthy Becerril is a 81 year old female with PMH significant for COPD, DM2, HTN, HLD, chronic pain syndrome due to osteoarthritis, A-fib on Warfarin, and obesity who presents to the ED on 1/10/2022 for evaluation after a fall.    ED workup reveals: low-grade fever of 100.2, hypertensive, intermittently tachypneic, initial lactic acid of 3.5, glucose 173, leukocytosis of 11.7, INR of 2.66, EKG shows rate of 79 bpm in exhilarated junctional rhythm, nonspecific ST and T wave abnormality, CT of cervical spine image mildly degraded by motion artifact without evidence of acute fracture, CT of head negative for acute pathology, x-ray of left hip/pelvis shows oblique lucency projecting over the left subcapital femoral neck, likely represents artifact, right hip arthroplasty, x-ray of left elbow and forearm negative for acute pathology, blood cultures obtained and pending, UA shows 50 of protein, positive nitrites, negative LE, seven WBC, and few bacteria, and chest x-ray negative.     #S/p fall  #Left forearm hematoma: fell onto left side when attempting to get off the toilet earlier in the morning prior to admission without LOC or hitting her head. CT of head and neck without acute pathology with patient being on Warfarin. X-ray of left elbow, forearm, left hip/pelvis negative for acute pathology. Due to ongoing hip pain in ED as well questionable abnormality on initial left hip x-ray (likely chronic per radiology read) a CT of the hip was obtained and negative for acute fracture. Patient sustained lacerations to left forearm with hematoma present otherwise no other significant injuries.   - monitor for signs of bleeding with left forearm hematoma, no current  concern for compartment syndrome  - pain control with PRN Tylenol, Tizanidine, Atarax, and Tramadol  - PT consult for mobility assessment  - SW consult, concern for patient's currently living situation     #Breakthrough COVID infection: known COVID exposure to son that patient lives with and tested positive last week. Mild symptoms of chills, three days of diarrhea last week, generalized headache, and central nonradiating chest pain last night. No associated SOB or cough. Low grade fever of 100.2 today but not hypoxic and CXR negative for infiltrates.   - keep on isolation precautions  - CXR on 1/10/22 negative  - PRN supplemental oxygen, wean as tolerated, currently not hypoxic  - no indication for decadron at this time    #Sepsis  #Possible UTI: on initial presentation patient met sepsis criteria with low grade fever of 100.2, leukocytosis of 11.7 with left shift, and lactic acid of 3.5. Lactic acid improved down to 1.8 after IVFs in the ED. Lower suspicion for overt infection other than COVID and lactic acidosis likely related to dehydration from recent diarrhea in setting of taking Metformin for diabetes. UA shows possible infection with positive nitrites, negative LE, 7 WBC, and few bacteria. CXR negative for pneumonia.   - received 500 ml NS bolus in ED, will hold off on additional IVFs  - received IV Zosyn in ED due to concern for sepsis, hold off on additional antibiotics, will follow urine culture results, would consider starting Rocephin if onset of urinary symptoms or persistent fevers  - follow blood cultures     #Paroxysmal A-fib on Warfarin: noted during last hospital stay in 09/2021 where patient converted on her own back to SR. Seen by cardiology during stay and initiated on Amiodarone and Warfarin.   - monitor on telemetry  - continue PTA Amiodarone and Metoprolol with parameters  - hold Warfarin for now with left forearm hematoma, if stable in AM likely able to resume tomorrow  - recheck INR in  AM    #DM2: most recent HgbA1c of 7.7 in 10/2021 with initial blood glucose of 173.   - resume PTA Lantus at 40 units (on 50 PTA) until PO well tolerated   - hold Metformin due to initial lactic acidosis  - resume PTA Glipizide  - medium sliding scale insulin     #COPD: no current or recent exacerbations  - resume PTA Dulera inhaler    #HTN: initially elevated, continue PTA Amlodipine, Lisinopril, and Metoprolol with parameters. Hold hydrochlorothiazide due to concern for presenting dehydration.     #HLD: resume PTA Atorvastatin    #Chronic pain syndrome: appears to be due to osteoarthritis for which the patient takes Tramadol intermittently for management.   - will liberalize PTA Tramadol for acute pain following fall PTA    #Obesity: noted, complicated cares     Clinically Significant Risk Factors Present on Admission             # Coagulation Defect: home medication list includes an anticoagulant medication      DVT Prophylaxis: Warfarin  Code Status: DNI, discussed with patient  Disposition: Expected stay >2 midnights, will admit to inpatient     Primary Care Physician   [facility]    Chief Complaint   Fall, left arm pain    History obtained from discussion with ED provider, Dr. Grady, chart review, and interview with patient.     History of Present Illness   Ruthy Becerril is a 81 year old female who presents with fall and left arm pain.  The patient states that shortly after getting up this morning and going to the bathroom as she was trying to get herself off the toilet she ended up getting tangled up in her walker and fell onto her left side, hitting her left arm and hip.  She states feeling slightly lightheaded and dizzy prior to her fall.  Does not believe she hit her head or had loss of consciousness.  She also states she felt like her legs locked up prior to falling.  She reports recent chills, diarrhea for 3 days last week that has since resolved, generalized headache, and central  nonradiating chest pain last night.  Denies any recent fevers, myalgias, loss of taste/smell, nausea, vomiting, abdominal pain, dysuria, urinary urgency or frequency, or shortness of breath.  The patient states that her son tested positive for Covid last week but she has been staying away from him at home, even though they live together.  The patient states she is COVID vaccinated with [employer].  The patient states that her daughter-in-law helps her with setting up and taking her pills.    Past Medical History    No past medical history on file.    Past Surgical History   No past surgical history on file.    Prior to Admission Medications   Prior to Admission Medications   Prescriptions Last Dose Informant Patient Reported? Taking?   amLODIPine (NORVASC) 10 MG tablet  Self Yes No   Sig: Take 10 mg by mouth daily   amiodarone (PACERONE) 200 MG tablet   No No   Sig: Take 1 tablet (200 mg) by mouth 3 times daily for 10 days   amiodarone (PACERONE) 200 MG tablet   No No   Sig: Take 1 tablet (200 mg) by mouth daily   atorvastatin (LIPITOR) 40 MG tablet  Self Yes No   Sig: Take 40 mg by mouth At Bedtime    diclofenac (VOLTAREN) 1 % topical gel   No No   Sig: Apply 2 g topically 4 times daily   glipiZIDE (GLUCOTROL) 10 MG tablet  Self Yes No   Sig: Take 10 mg by mouth 2 times daily (before meals)   insulin degludec (TRESIBA) 100 UNIT/ML pen   No No   Sig: Inject 40 Units Subcutaneous At Bedtime   lisinopril-hydrochlorothiazide (ZESTORETIC) 20-12.5 MG tablet  Self Yes No   Sig: Take 2 tablets by mouth daily   metFORMIN (GLUCOPHAGE) 500 MG tablet  Self Yes No   Sig: Take 500 mg by mouth 2 times daily (with meals)   metoprolol succinate ER (TOPROL-XL) 50 MG 24 hr tablet   No No   Sig: Take 1 tablet (50 mg) by mouth daily   miconazole (MICATIN) 2 % external powder   No No   Sig: Apply topically 2 times daily   traMADol (ULTRAM) 50 MG tablet   No No   Sig: Take 0.5 tablets (25 mg) by mouth every 6 hours as needed for  moderate pain   warfarin ANTICOAGULANT (COUMADIN) 1 MG tablet   No No   Sig: Take 2.5 tablets (2.5 mg) by mouth daily TAKE 2.5 mg warfarin tonight (9/7) then as directed from Pharmacy      Facility-Administered Medications: None     Allergies   Allergies   Allergen Reactions     Oxycodone-Acetaminophen Rash     Social History   Social History     Tobacco Use     Smoking status: Not on file     Smokeless tobacco: Not on file   Substance Use Topics     Alcohol use: Not on file     Social History     Social History Narrative     Not on file     Family History   Family history reviewed with patient and is noncontributory.    Review of Systems   A Comprehensive greater than 10 system review of systems was carried out.  Pertinent positives and negatives are noted above.  Otherwise negative for contributory information.    Physical Exam   Temp: 99.6  F (37.6  C) Temp src: Oral BP: (!) 151/64 Pulse: 73   Resp: 19 SpO2: 93 % O2 Device: None (Room air)    Vital Signs with Ranges  Temp:  [99.6  F (37.6  C)-100.2  F (37.9  C)] 99.6  F (37.6  C)  Pulse:  [73-80] 73  Resp:  [15-24] 19  BP: (117-161)/(64-87) 151/64  SpO2:  [93 %-96 %] 93 %  0 lbs 0 oz    GEN:  Alert, oriented x 3, appears comfortable, no overt distress  HEENT:  Normocephalic/atraumatic, no scleral icterus, no nasal discharge, mouth moist.  CV:  Regular rate and rhythm, no murmur or JVD.  S1 + S2 noted, no S3 or S4.  LUNGS: decreased air movement throughout with prolonged expiratory phase without wheezing or rales. Symmetric chest rise on inhalation noted.  ABD:  Active bowel sounds, soft, non-tender/non-distended.  No rebound/guarding/rigidity.  EXT: Left UE: forearm with lacerations and small hematoma present with mild tenderness to palpations, Left hip: tenderness over lateral aspect of hip with palpation and increases with flexion. No cyanosis.  No acute joint synovitis noted.  SKIN:  Dry to touch, no exanthems noted in the visualized areas.  NEURO:  Symmetric  muscle strength, slightly decreased in lower extremities, sensation to touch grossly intact.  Coordination symmetric on general exam.  No new focal deficits appreciated.    Data   Data reviewed today:  I personally reviewed the EKG tracing showing rate of 79 bpm in exhilarated junctional rhythm, nonspecific ST and T wave abnormality.    Results for orders placed or performed during the hospital encounter of 01/10/22   CT Head w/o Contrast     Status: None    Narrative    CT SCAN OF THE HEAD WITHOUT CONTRAST   1/10/2022 10:18 AM     HISTORY: Trauma. Headache.    TECHNIQUE:  Axial images of the head and coronal reformations without  IV contrast material. Radiation dose for this scan was reduced using  automated exposure control, adjustment of the mA and/or kV according  to patient size, or iterative reconstruction technique.    COMPARISON: None.    FINDINGS: There is no evidence of intracranial hemorrhage, mass, acute  infarct or anomaly. The ventricles are normal in size, shape and  configuration. Mild patchy periventricular white matter hypodensities  which are nonspecific, but likely related to chronic microvascular  ischemic disease.     The visualized portions of the sinuses and mastoids appear normal. The  bony calvarium and bones of the skull base appear intact.       Impression    IMPRESSION:     1. No evidence of acute intracranial hemorrhage, mass, or herniation.  2. Mild nonspecific white matter changes likely due to chronic  microvascular ischemic disease.     JOE BRAUN MD         SYSTEM ID:  [id number]   CT Cervical Spine w/o Contrast     Status: None    Narrative    CT CERVICAL SPINE WITHOUT CONTRAST 1/10/2022 10:17 AM     HISTORY: Fall. Trauma. Headache.     TECHNIQUE: Axial images of the cervical spine were obtained without  intravenous contrast. Multiplanar reformations were performed.   Radiation dose for this scan was reduced using automated exposure  control, adjustment of the mA and/or kV  according to patient size, or  iterative reconstruction technique.    COMPARISON: None.    FINDINGS: Images are mildly degraded by motion artifact, particularly  within the mid cervical spine. This limits evaluation for subtle  fractures.    Mild grade 1 anterolisthesis of C4 on C5 likely due to degenerative  facet arthropathy. No definite acute lucent fracture line visualized  noting limitations by motion. No significant loss of vertebral body  height. No prevertebral soft tissue swelling.    Marked degenerative endplate changes and loss of disc height at C6-C7.  Moderate degenerative endplate changes and loss of disc height at the  other levels. Prominent facet hypertrophy in the mid cervical spine,  right slightly greater than left.    Mild spinal canal narrowings at C3-C4, C4-C5, and C6-C7. Multiple  levels of neural foraminal narrowing due to disc osteophytes and facet  hypertrophy, most pronounced at C3-C4, C4-C5, and C6-C7.    Visualized paraspinous tissues: Unremarkable.      Impression    IMPRESSION:   1. Images are mildly degraded by motion artifact which slightly limits  evaluation, particularly at the mid cervical spine.  2. No evidence of acute fracture noting mild limitations by motion.  3. Multilevel degenerative changes in the cervical spine as detailed  above.    Results including limitations of the study were discussed with  Rosetta Grady at 10:32 AM on 1/10/2022.     JOE BRAUN MD         SYSTEM ID:  [id number]   XR Pelvis and Hip Left 1 View     Status: None    Narrative    XR PELVIS AND LEFT HIP ONE VIEW 1/10/2022 10:44 AM     INDICATION: Left hip pain after trauma.   COMPARISON: 9/2/2021.      Impression    IMPRESSION:  1.  Oblique lucency projecting over the left subcapital femoral neck.  This was present on the comparison radiograph (9-2-21), and is  slightly less conspicuous since that time. This finding likely  represents artifact (from overlapping osteophytes) or sequelae of  old  healing fracture. If there is high clinical concern for acute hip  fracture, left hip CT is recommended in further evaluation.  2.  Advanced left hip degenerative arthrosis, unchanged.  3.  No joint malalignment.  4.  Degenerative changes in the lower lumbar spine.  5.  Right total hip arthroplasty.  6.  Bone demineralization.  7.  Atherosclerotic calcification.    GERALD RODRIGUEZ MD         SYSTEM ID:  [id number]   Elbow  XR, G/E 3 views, left     Status: None    Narrative    LEFT ELBOW THREE OR MORE VIEWS  1/10/2022 10:44 AM     INDICATION: Left-sided elbow pain after trauma.     COMPARISON: None.      Impression    IMPRESSION:  1.  Normal joint spacing and alignment.  2.  No fracture or joint effusion.  3.  Tiny olecranon enthesophyte.    GERALD RODRIGUEZ MD         SYSTEM ID:  [id number]   Radius/Ulna XR,  PA &LAT, left     Status: None    Narrative    EXAM: FOREARM LEFT TWO VIEWS  DATE/TIME: 1/10/2022 10:45 AM     INDICATION: Left forearm pain after trauma.   COMPARISON: None.      Impression    IMPRESSION:  1.  No fracture or joint malalignment.  2.  Advanced thumb CMC degenerative arthrosis.  3.  Tiny olecranon enthesophyte.       GERALD RODRIGUEZ MD         SYSTEM ID:  [id number]   XR Chest Port 1 View     Status: None (Preliminary result)    Narrative    CHEST ONE VIEW  1/10/2022 12:02 PM     HISTORY: Fever, weakness.    COMPARISON: None.      Impression    IMPRESSION: No acute disease.   Basic metabolic panel     Status: Abnormal   Result Value Ref Range    Sodium 139 133 - 144 mmol/L    Potassium 3.6 3.4 - 5.3 mmol/L    Chloride 102 94 - 109 mmol/L    Carbon Dioxide (CO2) 30 20 - 32 mmol/L    Anion Gap 7 3 - 14 mmol/L    Urea Nitrogen 18 7 - 30 mg/dL    Creatinine 0.74 0.52 - 1.04 mg/dL    Calcium 9.7 8.5 - 10.1 mg/dL    Glucose 173 (H) 70 - 99 mg/dL    GFR Estimate 82 >60 mL/min/1.73m2   INR     Status: Abnormal   Result Value Ref Range    INR 2.66 (H) 0.85 - 1.15   Lactic acid whole blood     Status:  Abnormal   Result Value Ref Range    Lactic Acid 3.5 (H) 0.7 - 2.0 mmol/L   Troponin I     Status: Normal   Result Value Ref Range    Troponin I High Sensitivity 8 <54 ng/L   UA with Microscopic reflex to Culture     Status: Abnormal    Specimen: Urine, Catheter   Result Value Ref Range    Color Urine Light Yellow Colorless, Straw, Light Yellow, Yellow    Appearance Urine Slightly Cloudy (A) Clear    Glucose Urine 100  (A) Negative mg/dL    Bilirubin Urine Negative Negative    Ketones Urine Negative Negative mg/dL    Specific Gravity Urine 1.018 1.003 - 1.035    Blood Urine Small (A) Negative    pH Urine 5.5 5.0 - 7.0    Protein Albumin Urine 50  (A) Negative mg/dL    Urobilinogen Urine Normal Normal, 2.0 mg/dL    Nitrite Urine Positive (A) Negative    Leukocyte Esterase Urine Negative Negative    Bacteria Urine Few (A) None Seen /HPF    Mucus Urine Present (A) None Seen /LPF    RBC Urine 2 <=2 /HPF    WBC Urine 7 (H) <=5 /HPF    Squamous Epithelials Urine 1 <=1 /HPF    Hyaline Casts Urine 4 (H) <=2 /LPF    Narrative    Urine Culture ordered based on laboratory criteria   CBC with platelets and differential     Status: Abnormal   Result Value Ref Range    WBC Count 11.7 (H) 4.0 - 11.0 10e3/uL    RBC Count 4.88 3.80 - 5.20 10e6/uL    Hemoglobin 13.5 11.7 - 15.7 g/dL    Hematocrit 43.9 35.0 - 47.0 %    MCV 90 78 - 100 fL    MCH 27.7 26.5 - 33.0 pg    MCHC 30.8 (L) 31.5 - 36.5 g/dL    RDW 13.4 10.0 - 15.0 %    Platelet Count 284 150 - 450 10e3/uL    % Neutrophils 88 %    % Lymphocytes 4 %    % Monocytes 6 %    % Eosinophils 0 %    % Basophils 1 %    % Immature Granulocytes 1 %    NRBCs per 100 WBC 0 <1 /100    Absolute Neutrophils 10.2 (H) 1.6 - 8.3 10e3/uL    Absolute Lymphocytes 0.5 (L) 0.8 - 5.3 10e3/uL    Absolute Monocytes 0.8 0.0 - 1.3 10e3/uL    Absolute Eosinophils 0.1 0.0 - 0.7 10e3/uL    Absolute Basophils 0.1 0.0 - 0.2 10e3/uL    Absolute Immature Granulocytes 0.1 <=0.4 10e3/uL    Absolute NRBCs 0.0 10e3/uL    EKG 12-lead, tracing only     Status: None (Preliminary result)   Result Value Ref Range    Systolic Blood Pressure  mmHg    Diastolic Blood Pressure  mmHg    Ventricular Rate 79 BPM    Atrial Rate 80 BPM    IN Interval  ms    QRS Duration 74 ms     ms    QTc 447 ms    P Axis  degrees    R AXIS 24 degrees    T Axis 82 degrees    Interpretation ECG       Accelerated Junctional rhythm  Nonspecific ST and T wave abnormality  Abnormal ECG  When compared with ECG of 04-SEP-2021 14:25,  Previous ECG has undetermined rhythm, needs review  ST no longer depressed in Inferior leads  T wave inversion no longer evident in Inferior leads  T wave inversion no longer evident in Anterolateral leads     CBC with platelets differential     Status: Abnormal    Narrative    The following orders were created for panel order CBC with platelets differential.  Procedure                               Abnormality         Status                     ---------                               -----------         ------                     CBC with platelets and d...[[id number]]  Abnormal            Final result                 Please view results for these tests on the individual orders.     Denise Reynoso PA-C  [facility]  Securely message with the [employer] Web Console (learn more here)  Text page via [employer] Paging/Directory  I discussed the patient with Dr. Benitez and he agrees with the above plan.

## 2022-01-10 NOTE — ED PROVIDER NOTES
History   Chief Complaint:  Fall     The history is provided by the patient.      Ruthy Becerril is a 81 year old female on Coumadin with history of atrial fibrillation, hyperlipidemia, and type two diabetes who presents with left arm pain after a fall. Patient fell in her bathroom and her left arm got caught in her walker and she was unable to get it out. She complains of pain of her upper left arm. She felt lightheaded and dizzy prior to the fall which is not completely unusual for her. She did not hit her head or lose consciousness. She notes some frontal head pain. She does have some neck and back pain. She complains of bilateral hip and thigh pain. She notes she is intermittently tremulous. She has a slight cough and low grade fever. She has had diarrhea the past couple of days. She has otherwise been feeling okay the past couple of days. She has chronic knee pain. Denies dysuria, frequency, or hematuria. No chest pain or abdominal pain. She uses a walker at baseline. History of right sided hip replacement. She did not take any medications this morning. She lives with her son and his wife.     Review of Systems   Constitutional: Positive for fever.   Respiratory: Positive for cough.    Cardiovascular: Negative for chest pain.   Gastrointestinal: Positive for diarrhea. Negative for abdominal pain.   Genitourinary: Negative for dysuria, frequency and hematuria.   Musculoskeletal: Positive for back pain and neck pain.        + left arm pain, bilateral hip pain   Neurological: Positive for dizziness, tremors, light-headedness and headaches.   All other systems reviewed and are negative.     Allergies:  Oxycodone-acetaminophen     Medications:  Pacerone   Norvasc   Lipitor   Glucotrol   Zestoretic   Glucophage   Toprol   Ultram   Coumadin     Past Medical History:     Baker's cyst of knee   Essential hypertension   Chronic airway obstruction   Type two diabetes   Uterus cancer   Paroxysmal atrial fibrillation    Obesity   Hyperlipidemia       Past Surgical History:    Vaginal hysterectomy   Umbilical hernia repair   Cholecystectomy   Hip replacement    Family History:    Father: heart disease, dementia   Mother: stroke, hypertension  Sister: leukemia     Social History:  Presents to ED alone   Presents via EMS     Physical Exam     Patient Vitals for the past 24 hrs:   BP Temp Temp src Pulse Resp SpO2   01/10/22 1345 116/62 -- -- 68 12 92 %   01/10/22 1330 137/57 -- -- 67 22 94 %   01/10/22 1323 125/59 -- -- 69 20 95 %   01/10/22 1300 (!) 135/123 -- -- 71 26 96 %   01/10/22 1245 (!) 121/117 -- -- 74 18 94 %   01/10/22 1230 (!) 145/110 -- -- 73 16 91 %   01/10/22 1215 (!) 151/64 -- -- 73 19 93 %   01/10/22 1200 (!) 160/68 -- -- 75 18 94 %   01/10/22 1145 (!) 144/69 -- -- 75 15 94 %   01/10/22 1130 (!) 150/65 -- -- 78 24 93 %   01/10/22 1115 121/69 -- -- 77 17 95 %   01/10/22 1100 (!) 161/72 -- -- 78 20 93 %   01/10/22 1045 (!) 161/69 99.6  F (37.6  C) Oral 80 20 94 %   01/10/22 1000 -- -- -- -- 22 94 %   01/10/22 0916 117/87 100.2  F (37.9  C) Oral 77 22 96 %       Physical Exam    HENT:    Mouth/Throat: Dry oral mucosa.    Eyes: Conjunctivae are normal. No scleral icterus.  Neck: Moving the neck freely without apparent discomfort but does have some midline cervical tenderness.  Cardiovascular: Normal rate, regular rhythm and intact distal pulses.    Pulmonary/Chest: Effort normal and breath sounds normal.   Abdominal: Soft.  No distension. There is no tenderness.   Musculoskeletal:  Tenderness to left arm medial distal humerus and elbow. Left lower extremity seems to be shortened.  She has pain with range of motion of the left hip.  Some pain with AP and lateral compression of the pelvis.  Elbow range of motion appears intact.  No significant wrist tenderness.  No midline posterior lumbar or thoracic tenderness.   Neurological:Alert and answering questions appropriately. Coordination normal.  Symmetric strength in  bilateral upper extremities.  Symmetric strength with dorsi and plantarflexion of her feet bilaterally.  Skin: Skin is warm and dry.  Small skin tear and ecchymosis over dorsal surface of left forearm.   Psychiatric: Normal mood and affect.     Emergency Department Course   ECG  ECG obtained at 0959, ECG read at 1017  Accelerated Junctional rhythm   Nonspecific ST and T wave abnormality   Abnormal ECG    Rate 79 bpm. GA interval * ms. QRS duration 74 ms. QT/QTc 390/447 ms. P-R-T axes * 24 82.     Imaging:  CT Hip Left w/o Contrast   Preliminary Result   IMPRESSION:   1.  Severe end-stage left hip osteoarthritis.    2.  No evidence for occult lower lumbar spine, sacral, pelvic, or   proximal femur fracture. Specifically, no left hip fracture or   dislocation. MRI more sensitive to detect nondisplaced fractures.   3.  Right total hip arthroplasty.   4.  Arterial calcification.       XR Chest Port 1 View   Final Result   IMPRESSION: No acute disease.      LIZA HOWARD MD            SYSTEM ID:  [id number]      Radius/Ulna XR,  PA &LAT, left   Final Result   IMPRESSION:   1.  No fracture or joint malalignment.   2.  Advanced thumb CMC degenerative arthrosis.   3.  Tiny olecranon enthesophyte.          GERALD RODRIGUEZ MD            SYSTEM ID:  [id number]      Elbow  XR, G/E 3 views, left   Final Result   IMPRESSION:   1.  Normal joint spacing and alignment.   2.  No fracture or joint effusion.   3.  Tiny olecranon enthesophyte.      GERALD RODRIGUEZ MD            SYSTEM ID:  [id number]      XR Pelvis and Hip Left 1 View   Final Result   IMPRESSION:   1.  Oblique lucency projecting over the left subcapital femoral neck.   This was present on the comparison radiograph (9-2-21), and is   slightly less conspicuous since that time. This finding likely   represents artifact (from overlapping osteophytes) or sequelae of old   healing fracture. If there is high clinical concern for acute hip   fracture, left hip CT is recommended  in further evaluation.   2.  Advanced left hip degenerative arthrosis, unchanged.   3.  No joint malalignment.   4.  Degenerative changes in the lower lumbar spine.   5.  Right total hip arthroplasty.   6.  Bone demineralization.   7.  Atherosclerotic calcification.      GERALD RODRIGUEZ MD            SYSTEM ID:  [id number]      CT Head w/o Contrast   Final Result   IMPRESSION:      1. No evidence of acute intracranial hemorrhage, mass, or herniation.   2. Mild nonspecific white matter changes likely due to chronic   microvascular ischemic disease.       JOE BRAUN MD            SYSTEM ID:  [id number]      CT Cervical Spine w/o Contrast   Final Result   IMPRESSION:    1. Images are mildly degraded by motion artifact which slightly limits   evaluation, particularly at the mid cervical spine.   2. No evidence of acute fracture noting mild limitations by motion.   3. Multilevel degenerative changes in the cervical spine as detailed   above.      Results including limitations of the study were discussed with   Rosetta Grady at 10:32 AM on 1/10/2022.       JOE BRAUN MD            SYSTEM ID:  [id number]      Report per radiology    Laboratory:  Labs Ordered and Resulted from Time of ED Arrival to Time of ED Departure   BASIC METABOLIC PANEL - Abnormal       Result Value    Sodium 139      Potassium 3.6      Chloride 102      Carbon Dioxide (CO2) 30      Anion Gap 7      Urea Nitrogen 18      Creatinine 0.74      Calcium 9.7      Glucose 173 (*)     GFR Estimate 82     INR - Abnormal    INR 2.66 (*)    LACTIC ACID WHOLE BLOOD - Abnormal    Lactic Acid 3.5 (*)    ROUTINE UA WITH MICROSCOPIC REFLEX TO CULTURE - Abnormal    Color Urine Light Yellow      Appearance Urine Slightly Cloudy (*)     Glucose Urine 100  (*)     Bilirubin Urine Negative      Ketones Urine Negative      Specific Gravity Urine 1.018      Blood Urine Small (*)     pH Urine 5.5      Protein Albumin Urine 50  (*)     Urobilinogen Urine Normal       Nitrite Urine Positive (*)     Leukocyte Esterase Urine Negative      Bacteria Urine Few (*)     Mucus Urine Present (*)     RBC Urine 2      WBC Urine 7 (*)     Squamous Epithelials Urine 1      Hyaline Casts Urine 4 (*)    CBC WITH PLATELETS AND DIFFERENTIAL - Abnormal    WBC Count 11.7 (*)     RBC Count 4.88      Hemoglobin 13.5      Hematocrit 43.9      MCV 90      MCH 27.7      MCHC 30.8 (*)     RDW 13.4      Platelet Count 284      % Neutrophils 88      % Lymphocytes 4      % Monocytes 6      % Eosinophils 0      % Basophils 1      % Immature Granulocytes 1      NRBCs per 100 WBC 0      Absolute Neutrophils 10.2 (*)     Absolute Lymphocytes 0.5 (*)     Absolute Monocytes 0.8      Absolute Eosinophils 0.1      Absolute Basophils 0.1      Absolute Immature Granulocytes 0.1      Absolute NRBCs 0.0     INFLUENZA A/B & SARS-COV2 PCR MULTIPLEX - Abnormal    Influenza A PCR Negative      Influenza B PCR Negative      SARS CoV2 PCR Positive (*)    TROPONIN I - Normal    Troponin I High Sensitivity 8     LACTIC ACID WHOLE BLOOD - Normal    Lactic Acid 1.8     BLOOD CULTURE   BLOOD CULTURE   URINE CULTURE      Emergency Department Course:    Reviewed:  I reviewed nursing notes, vitals, past medical history and Care Everywhere    Assessments:  0928 I obtained history and examined the patient as noted above.   1113 I rechecked the patient. She still complains of left sided hip pain. She is feeling improved after pain medication.   1203 I rechecked the patient and explained findings.     Consults:  1034 I spoke with Dr. De Leon from Radiology regarding patient's presentation, findings, and plan of care.     1226 I spoke with Eveline Reynoso PA-C for Dr. Benitez from the Hospitalist service regarding patient's presentation, findings, and plan of care.     Interventions:  0955 Ultram, 50 mg, PO   1134 Zosyn, 3.375 g, IV   1134 NS, 500 mL, IV   1148 Tdap, 0.5 mL, Intramuscular   1245 Tylenol, 500 mg, PO     Disposition:  The  patient was admitted to the hospital under the care of Dr. Benitez.     Impression & Plan     CMS Diagnoses: The Lactic acid level is elevated due to possible sepsis, at this time there is no sign of severe sepsis or septic shock. and None  Patient did not meet criteria for SIRS without a significant elevated white blood cell count and with only low-grade fever however due to suspicion for possible contributing infection blood cultures were drawn and urine culture was obtained.  Empiric antibiotics were initiated.    Medical Decision Making:  Ruthy Becerril is a 81 year old female presents for evaluation of injury after fall.  She was noted to have a low-grade temp on arrival which improved without intervention and borderline elevated white blood cell count.  Due to the low-grade fever on arrival lactic acid was sent which was elevated.  This prompted blood cultures and urine culture and initiation of empiric antibiotics.  Patient was not significantly clinically dehydrated so a small IV fluid bolus was initiated.  Remainder of evaluation included CT scan of head and C-spine.  CT of the brain revealed no intracranial bleed.  CT of the cervical spine was somewhat limited by motion artifact however suspicion for acute injury is low and therefore repeat CT was not obtained.  X-ray of the hip and left upper extremity did not reveal an obvious acute injury however due to continued left hip pain a CT scan was obtained.  This was negative for acute fracture.  Chest x-ray and Covid test was obtained due to the low-grade fever chest x-ray revealed no acute injury or infiltrates over the Covid test was positive.  He did have a small skin tear and according to records her tetanus was not up-to-date and therefore this was also updated.  Patient has been accepted for admission for ongoing evaluation and management..    Diagnosis:    ICD-10-CM    1. Severe sepsis (H)  A41.9     R65.20     presumed, likely UTI   2. Hip pain, left   M25.552    3. Pain of left upper extremity  M79.602     contusion   4. Abrasion of left upper extremity, initial encounter  S40.812A    5. Anticoagulated on warfarin  Z79.01    6. Fall, initial encounter  W19.XXXA    7. Infection due to 2019 novel coronavirus  U07.1        Scribe Disclosure:  I, Kuldip Cain, am serving as a scribe at 9:26 AM on 1/10/2022 to document services personally performed by Rosetta Grady MD based on my observations and the provider's statements to me.            Rosetta Grady MD  01/10/22 1500

## 2022-01-11 NOTE — PLAN OF CARE
Ultram and tylenol for pain. RBG 40 at 0200 but alert and able to drink juice- recheck 104. Purewick in place. Turned with encouragement. Lungs clear, dry cough noted. Saline locked, CHO diet. 99.1 tmax  Slept well. Continue to monitor pain and glucose levels.

## 2022-01-11 NOTE — PROGRESS NOTES
[facility]  Hospitalist Progress Note  Juan Manuel Benitez M.D., M.B.A.   01/11/2022    Reason for Stay/active problem list      Mechanical fall    Left arm hematoma    Acute COVID-19 infection    Generalized weakness    UTI         Assessment and Plan:        Summary of Stay: Ruthy Becerril is a 81 year old female with PMH significant for COPD, DM2, HTN, HLD, chronic pain syndrome due to osteoarthritis, A-fib on Warfarin, and obesity who presents to the ED on 1/10/2022 for evaluation after a fall.     ED workup reveals: low-grade fever of 100.2, hypertensive, intermittently tachypneic, initial lactic acid of 3.5, glucose 173, leukocytosis of 11.7, INR of 2.66, EKG shows rate of 79 bpm in exhilarated junctional rhythm, nonspecific ST and T wave abnormality, CT of cervical spine image mildly degraded by motion artifact without evidence of acute fracture, CT of head negative for acute pathology, x-ray of left hip/pelvis shows oblique lucency projecting over the left subcapital femoral neck, likely represents artifact, right hip arthroplasty, x-ray of left elbow and forearm negative for acute pathology, blood cultures obtained and pending, UA shows 50 of protein, positive nitrites, negative LE, seven WBC, and few bacteria, and chest x-ray negative.       Problem List with Assessment and Plan:      1. Mechanical fall with left forearm hematoma    Likely secondary to generalized weakness and UTI as well as acute COVID-19 infection    PT OT consulted for assessment and recommendation    Pain medications as needed      2. Acute COVID-19 infection      Breakthrough infection-vaccinated with [employer]    Mild symptoms with fever, chills, diarrhea, headache    Positive SARS-CoV-2 PCR January 10    Chest x-ray clear but she had episodes of hypoxia with oxygen saturation down to 88 to 89% requiring a liter of oxygen via nasal cannula    COVID isolation, start dexamethasone, monitor hypoxia and  symptoms.      3. UTI      Acute complicated UTI with sepsis    Low-grade temp, urine culture growing gram-negative rods    Received Zosyn in the emergency department, continue antibiotic with ceftriaxone    4.  Diabetes mellitus type 2    On home insulin Lantus 50 units daily, metformin, glipizide.  Hemoglobin A1c 7.7 in October 2021.    Resumed insulin at lower dose due to low blood sugars.  Currently on 20 units of Lantus insulin at bedtime, sliding scale insulin, glipizide.  Metformin on hold    Blood sugar 215, continue sliding scale insulin and monitor closely    5.  Right lower extremity pain:     Concern for DVT, check ultrasound of lower extremity.  Start Lovenox, Coumadin    Chronic medical problems:       #Paroxysmal A-fib on Warfarin: noted during last hospital stay in 09/2021 where patient converted on her own back to SR. Seen by cardiology during stay and initiated on Amiodarone and Warfarin.   - monitor on telemetry  - continue PTA Amiodarone and Metoprolol with parameters  -Resume warfarin, monitor INR.     #COPD: no current or recent exacerbations  - resume PTA Dulera inhaler     #HTN: initially elevated, continue PTA Amlodipine, Lisinopril, and Metoprolol with parameters.  Continue to hold hydrochlorothiazide due to concern for presenting dehydration.      #HLD: resumed PTA Atorvastatin     #Chronic pain syndrome: appears to be due to osteoarthritis for which the patient takes Tramadol intermittently for management.   - will liberalize PTA Tramadol for acute pain following fall PTA     #Obesity: Complicates care        Plan for today:    Start oral steroid    Monitor blood sugar, continue current insulin regimen    PT OT for discharge planning    Ultrasound of lower extremities    Pain control        LDA     Access : Peripheral     Muhammad Catheter: Not present        FEN :    Orders Placed This Encounter      Moderate Consistent Carb (60 g CHO per Meal) Diet           Intake/Output Summary (Last 24  "hours) at 1/11/2022 1508  Last data filed at 1/11/2022 0652  Gross per 24 hour   Intake --   Output 350 ml   Net -350 ml          DVT Prophylaxis:     Warfarin    Code Status:      DNI    Estimated discharge  disposition and plan:   Home versus TCU in the next 2 to 3 days pending improvement of symptoms and resolution of hypoxia            Interval History (Subjective):        Patient is seen and examined by me today and medical record reviewed.Overnight events noted and care discussed with nursing staff.  Patient was seen in the emergency department while boarding down there.  She has right lower extremity calf pain and left upper extremity pain as well.  Denies any chest pain but she has some shortness of breath and requiring a liter of oxygen via nasal cannula.  Spoke with bedside nurse.  She denies any fever or chills.  No diarrhea.              Physical Exam:        Last Vital Signs:  BP (!) 146/75   Pulse 52   Temp 98.4  F (36.9  C) (Oral)   Resp 12   Ht 1.727 m (5' 8\")   Wt 86.2 kg (190 lb)   SpO2 95%   BMI 28.89 kg/m      I/O last 3 completed shifts:  In: -   Out: 350 [Urine:350]    Wt Readings from Last 5 Encounters:   01/11/22 86.2 kg (190 lb)   09/03/21 86 kg (189 lb 9.6 oz)        Constitutional: Awake, alert, cooperative, no apparent distress     Respiratory: Clear to auscultation bilaterally, no crackles or wheezing   Cardiovascular: Regular rate and rhythm, normal S1 and S2, and no murmur noted   Abdomen: Normal bowel sounds, soft, non-distended, non-tender   Skin: No new rashes, no cyanosis, dry to touch   Neuro: Alert with  no new focal weakness   Extremities:  Left upper extremity tenderness and right lower extremity calf tenderness   Other(s):        All other systems: Negative          Medications:        All current medications were reviewed with changes reflected in problem list.         Data:      All new lab and imaging data was reviewed.      Data reviewed today: I reviewed all new " labs and imaging results over the last 24 hours. I personally reviewed       Recent Labs   Lab 01/11/22  0717 01/10/22  0950   WBC 5.9 11.7*   HGB 11.4* 13.5   HCT 37.1 43.9   MCV 92 90    284     No results for input(s): CULT in the last 168 hours.  Recent Labs   Lab 01/11/22  1357 01/11/22  1208 01/11/22  1051 01/11/22  0735 01/11/22  0717 01/10/22  1811 01/10/22  0950   NA  --   --   --   --  142  --  139   POTASSIUM 4.2  --  3.5  --  3.1*  --  3.6   CHLORIDE  --   --   --   --  106  --  102   CO2  --   --   --   --  30  --  30   ANIONGAP  --   --   --   --  6  --  7   GLC  --  215*  --  93 93   < > 173*   BUN  --   --   --   --  25  --  18   CR  --   --   --   --  1.02  --  0.74   GFRESTIMATED  --   --   --   --  56*  --  82   NATACHA  --   --   --   --  9.1  --  9.7    < > = values in this interval not displayed.       Recent Labs   Lab 01/11/22  1208 01/11/22  0735 01/11/22  0717 01/11/22  0319 01/11/22  0235   * 93 93 104* 42*       Recent Labs   Lab 01/11/22  0717 01/10/22  0950   INR 1.49* 2.66*         No results for input(s): TROPONIN, TROPI, TROPR in the last 168 hours.    Invalid input(s): TROP, TROPONINIES    Recent Results (from the past 48 hour(s))   CT Cervical Spine w/o Contrast    Narrative    CT CERVICAL SPINE WITHOUT CONTRAST 1/10/2022 10:17 AM     HISTORY: Fall. Trauma. Headache.     TECHNIQUE: Axial images of the cervical spine were obtained without  intravenous contrast. Multiplanar reformations were performed.   Radiation dose for this scan was reduced using automated exposure  control, adjustment of the mA and/or kV according to patient size, or  iterative reconstruction technique.    COMPARISON: None.    FINDINGS: Images are mildly degraded by motion artifact, particularly  within the mid cervical spine. This limits evaluation for subtle  fractures.    Mild grade 1 anterolisthesis of C4 on C5 likely due to degenerative  facet arthropathy. No definite acute lucent fracture line  visualized  noting limitations by motion. No significant loss of vertebral body  height. No prevertebral soft tissue swelling.    Marked degenerative endplate changes and loss of disc height at C6-C7.  Moderate degenerative endplate changes and loss of disc height at the  other levels. Prominent facet hypertrophy in the mid cervical spine,  right slightly greater than left.    Mild spinal canal narrowings at C3-C4, C4-C5, and C6-C7. Multiple  levels of neural foraminal narrowing due to disc osteophytes and facet  hypertrophy, most pronounced at C3-C4, C4-C5, and C6-C7.    Visualized paraspinous tissues: Unremarkable.      Impression    IMPRESSION:   1. Images are mildly degraded by motion artifact which slightly limits  evaluation, particularly at the mid cervical spine.  2. No evidence of acute fracture noting mild limitations by motion.  3. Multilevel degenerative changes in the cervical spine as detailed  above.    Results including limitations of the study were discussed with  Rosetta Grady at 10:32 AM on 1/10/2022.     JOE BRAUN MD         SYSTEM ID:  [id number]   CT Head w/o Contrast    Narrative    CT SCAN OF THE HEAD WITHOUT CONTRAST   1/10/2022 10:18 AM     HISTORY: Trauma. Headache.    TECHNIQUE:  Axial images of the head and coronal reformations without  IV contrast material. Radiation dose for this scan was reduced using  automated exposure control, adjustment of the mA and/or kV according  to patient size, or iterative reconstruction technique.    COMPARISON: None.    FINDINGS: There is no evidence of intracranial hemorrhage, mass, acute  infarct or anomaly. The ventricles are normal in size, shape and  configuration. Mild patchy periventricular white matter hypodensities  which are nonspecific, but likely related to chronic microvascular  ischemic disease.     The visualized portions of the sinuses and mastoids appear normal. The  bony calvarium and bones of the skull base appear intact.        Impression    IMPRESSION:     1. No evidence of acute intracranial hemorrhage, mass, or herniation.  2. Mild nonspecific white matter changes likely due to chronic  microvascular ischemic disease.     JOE BRAUN MD         SYSTEM ID:  [id number]   XR Pelvis and Hip Left 1 View    Narrative    XR PELVIS AND LEFT HIP ONE VIEW 1/10/2022 10:44 AM     INDICATION: Left hip pain after trauma.   COMPARISON: 9/2/2021.      Impression    IMPRESSION:  1.  Oblique lucency projecting over the left subcapital femoral neck.  This was present on the comparison radiograph (9-2-21), and is  slightly less conspicuous since that time. This finding likely  represents artifact (from overlapping osteophytes) or sequelae of old  healing fracture. If there is high clinical concern for acute hip  fracture, left hip CT is recommended in further evaluation.  2.  Advanced left hip degenerative arthrosis, unchanged.  3.  No joint malalignment.  4.  Degenerative changes in the lower lumbar spine.  5.  Right total hip arthroplasty.  6.  Bone demineralization.  7.  Atherosclerotic calcification.    GERALD RODRIGUEZ MD         SYSTEM ID:  [id number]   Elbow  XR, G/E 3 views, left    Narrative    LEFT ELBOW THREE OR MORE VIEWS  1/10/2022 10:44 AM     INDICATION: Left-sided elbow pain after trauma.     COMPARISON: None.      Impression    IMPRESSION:  1.  Normal joint spacing and alignment.  2.  No fracture or joint effusion.  3.  Tiny olecranon enthesophyte.    GERALD RODRIGUEZ MD         SYSTEM ID:  [id number]   Radius/Ulna XR,  PA &LAT, left    Narrative    EXAM: FOREARM LEFT TWO VIEWS  DATE/TIME: 1/10/2022 10:45 AM     INDICATION: Left forearm pain after trauma.   COMPARISON: None.      Impression    IMPRESSION:  1.  No fracture or joint malalignment.  2.  Advanced thumb CMC degenerative arthrosis.  3.  Tiny olecranon enthesophyte.       GERALD RODRIGUEZ MD         SYSTEM ID:  [id number]   XR Chest Port 1 View    Narrative    CHEST ONE VIEW   1/10/2022 12:02 PM     HISTORY: Fever, weakness.    COMPARISON: None.      Impression    IMPRESSION: No acute disease.    LIZA HOWARD MD         SYSTEM ID:  [id number]   CT Hip Left w/o Contrast    Narrative    CT HIP LEFT WITHOUT CONTRAST 1/10/2022 1:22 PM    INDICATION: Left hip pain. Fall.  COMPARISON: Pelvis and left hip radiographic exam earlier today.  TECHNIQUE: Noncontrast. Axial, sagittal and coronal thin-section  reconstruction. Dose reduction techniques were used.   CONTRAST: None.    FINDINGS: Severe end-stage left hip osteoarthritis with bone-on-bone  abutment, large marginal osteophyte production and collapse of the  superolateral left femoral head articular surface. Loose bodies are  present within the inferomedial left hip joint. No acute displaced  fracture. No CT finding for classic femoral head osteonecrosis.    Right total hip arthroplasty in place without acute displaced  periprosthetic fracture. Degenerative change lower lumbar spine with  multilevel facet arthropathy and degenerative disc disease.  Degenerative change both sacroiliac joints with vacuum joint  phenomenon. Mild arthritis at the symphysis pubis. No concerning bone  lesion.    Atherosclerotic vascular disease. No evidence for an intestinal  obstruction. Absent uterus. Fat containing left inguinal hernia. No  sizable hip joint effusion. No significant free intrapelvic fluid.      Impression    IMPRESSION:  1.  Severe end-stage left hip osteoarthritis.   2.  No evidence for occult lower lumbar spine, sacral, pelvic, or  proximal femur fracture. Specifically, no left hip fracture or  dislocation. MRI more sensitive to detect nondisplaced fractures.  3.  Right total hip arthroplasty.  4.  Arterial calcification.     SHAISTA CARLOS MD         SYSTEM ID:  [id number]       COVID Status:  COVID-19 PCR Results    COVID-19 PCR Results 9/2/21 1/10/22   SARS CoV2 PCR Negative Positive (A)   (A) Abnormal value       Comments are available for  some flowsheets but are not being displayed.         COVID-19 Antibody Results, Testing for Immunity    COVID-19 Antibody Results, Testing for Immunity   No data to display.              Disclaimer: This note consists of symbols derived from keyboarding, dictation and/or voice recognition software. As a result, there may be errors in the script that have gone undetected. Please consider this when interpreting information found in this chart.

## 2022-01-11 NOTE — PLAN OF CARE
Assumed care of pt from [id number]. HR bradycardic, hematoma notable on arm. BG 95, lantus held. Ultram given x1 for pain. No other concerns.

## 2022-01-11 NOTE — PROGRESS NOTES
01/11/22 1500   Quick Adds   Type of Visit Initial PT Evaluation   Living Environment   Current Living Arrangements house   Home Accessibility stairs to enter home;stairs within home   Transportation Anticipated family or friend will provide   Living Environment Comments Pt lives in a house with her son and DIL. Patient gets help with cooking and cleaning. She is able to groom and bath on her own.   Self-Care   Equipment Currently Used at Home walker, rolling   Activity/Exercise/Self-Care Comment Typically mod I with a walker.    Disability/Function   Fall history within last six months yes   General Information   Onset of Illness/Injury or Date of Surgery 01/10/22   Referring Physician Denise Reynoso PA-C   Patient/Family Therapy Goals Statement (PT) To get stronger   Pertinent History of Current Problem (include personal factors and/or comorbidities that impact the POC) 81 year old female with PMH significant for COPD, DM2, HTN, HLD, chronic pain syndrome due to osteoarthritis, A-fib on Warfarin, and obesity who presents to the ED on 1/10/2022 for evaluation after a fall. Found to have L forearm hematoma, UTI and COVID.    Existing Precautions/Restrictions fall;oxygen therapy device and L/min   Cognition   Orientation Status (Cognition) oriented x 3   Pain Assessment   Patient Currently in Pain Yes, see Vital Sign flowsheet   Strength   Strength Comments Decreased globally; needs physical assist with limited mobility.    Bed Mobility   Bed Mobility supine-sit;rolling left;rolling right;sit-supine;scooting/bridging   Rolling Left Butts (Bed Mobility) moderate assist (50% patient effort)   Rolling Right Butts (Bed Mobility) moderate assist (50% patient effort)   Scooting/Bridging Butts (Bed Mobility) dependent (less than 25% patient effort)   Supine-Sit Butts (Bed Mobility) moderate assist (50% patient effort)   Sit-Supine Butts (Bed Mobility) maximum assist (25% patient  effort)   Balance   Balance Comments Seated balance fair. CGA progressing to [place] with fatigue.    Clinical Impression   Criteria for Skilled Therapeutic Intervention yes, treatment indicated   PT Diagnosis (PT) Impaired functional mobility   Influenced by the following impairments Decreased strength, impaired balance, decreased activity tolerance   Functional limitations due to impairments Decreased IND with bed mobility, transfers, ambulation and stairs   Clinical Presentation Evolving/Changing   Clinical Presentation Rationale Multiple impairments, poor equipment on eval, L LE pain, questionable support, still undergoing medical workup.    Clinical Decision Making (Complexity) moderate complexity   Therapy Frequency (PT) daily   Predicted Duration of Therapy Intervention (days/wks) One week   Planned Therapy Interventions (PT) bed mobility training;gait training;patient/family education;strengthening;transfer training;progressive activity/exercise   Risk & Benefits of therapy have been explained evaluation/treatment results reviewed;care plan/treatment goals reviewed;participants voiced agreement with care plan;participants included;patient   PT Discharge Planning    PT Discharge Recommendation (DC Rec) Transitional Care Facility   PT Rationale for DC Rec Below baseline; at this point would require a lift for OOB mobility.    PT Brief overview of current status  Lift.    Total Evaluation Time   Total Evaluation Time (Minutes) 5

## 2022-01-11 NOTE — PHARMACY-ANTICOAGULATION SERVICE
Clinical Pharmacy - Warfarin Dosing Consult     Pharmacy has been consulted to manage this patient s warfarin therapy.  Indication: Atrial Fibrillation  Therapy Goal: INR 2-3  OP Anticoag Clinic: [facility]  Warfarin Prior to Admission: Yes  Warfarin PTA Regimen: 2 mg MWF, 2.5 mg all other days  Significant drug interactions: Amiodarone (stable, PTA interaction), antibiotics (new), dexamethasone (new)  Recent documented change in oral intake/nutrition: Unknown    INR   Date Value Ref Range Status   01/11/2022 1.49 (H) 0.85 - 1.15 Final   01/10/2022 2.66 (H) 0.85 - 1.15 Final       Recommend warfarin 2.5 mg today.  Pharmacy will monitor Ruthy Becerril daily and order warfarin doses to achieve specified goal.      Please contact pharmacy as soon as possible if the warfarin needs to be held for a procedure or if the warfarin goals change.     Mulu Vazquez, PharmD, [facility]  Emergency Medicine Clinical Pharmacist  [phone]

## 2022-01-12 NOTE — PLAN OF CARE
To Do:  End of Shift Summary  For vital signs and complete assessments, please see documentation flowsheets.     Pertinent assessments: A&O-- DENIES pain and nausea ---using purewick--Treatment Plan: monitor  Bedside Nurse: Delaney Ardon RN

## 2022-01-12 NOTE — PLAN OF CARE
End of Shift Summary  For vital signs and complete assessments, please see documentation flowsheets.     Pertinent assessments: A&Ox4. VSS. 2L O2. Denies pain and nausea. Purewick  in place.     Major Shift Events: None     Treatment Plan: Rocephin, Lovenox

## 2022-01-12 NOTE — PROGRESS NOTES
[facility]  Hospitalist Progress Note  Juan Manuel Benitez M.D., M.B.A.   01/12/2022    Reason for Stay/active problem list      Mechanical fall    Left arm hematoma    Acute COVID-19 infection    Acute hypoxic respiratory failure    Generalized weakness    UTI         Assessment and Plan:        Summary of Stay: Ruthy Becerril is a 81 year old female with PMH significant for COPD, DM2, HTN, HLD, chronic pain syndrome due to osteoarthritis, A-fib on Warfarin, and obesity who presents to the ED on 1/10/2022 for evaluation after a fall.     ED workup reveals: low-grade fever of 100.2, hypertensive, intermittently tachypneic, initial lactic acid of 3.5, glucose 173, leukocytosis of 11.7, INR of 2.66, EKG shows rate of 79 bpm in exhilarated junctional rhythm, nonspecific ST and T wave abnormality, CT of cervical spine image mildly degraded by motion artifact without evidence of acute fracture, CT of head negative for acute pathology, x-ray of left hip/pelvis shows oblique lucency projecting over the left subcapital femoral neck, likely represents artifact, right hip arthroplasty, x-ray of left elbow and forearm negative for acute pathology, blood cultures obtained and pending, UA shows 50 of protein, positive nitrites, negative LE, seven WBC, and few bacteria, and chest x-ray negative.       Problem List with Assessment and Plan:      1. Mechanical fall with left forearm hematoma    Likely secondary to generalized weakness and UTI as well as acute COVID-19 infection    PT OT consulted for assessment and recommendation-TCU recommended    Pain medications as needed      2. Acute COVID-19 infection      Breakthrough infection-vaccinated with [employer]    Mild symptoms with fever, chills, diarrhea, headache, mildly hypoxia requiring couple liters of oxygen    Positive SARS-CoV-2 PCR January 10    Chest x-ray clear    COVID isolation, continue dexamethasone, monitor hypoxia and  symptoms.      3. UTI      Acute complicated UTI with sepsis-E. coli, pansensitive except for ampicillin    Afebrile ,    Received Zosyn in the emergency department, continue antibiotic with ceftriaxone for now     4.  Diabetes mellitus type 2    On home insulin Lantus 50 units daily, metformin, glipizide.  Hemoglobin A1c 7.7 in October 2021.    Resumed insulin at lower dose due to low blood sugars.  Currently on 20 units of Lantus insulin at bedtime, sliding scale insulin, glipizide.  Metformin on hold    Blood sugar -not controlled his blood sugar over 200 currently at 262     We will titrate her insulin to 30 units at bedtime  continue sliding scale insulin and monitor closely    5.  Right lower extremity pain:    DVT ruled out with ultrasound of lower extremities     Chronic medical problems:       #Paroxysmal A-fib on Warfarin: noted during last hospital stay in 09/2021 where patient converted on her own back to SR. Seen by cardiology during stay and initiated on Amiodarone and Warfarin.   - monitor on telemetry  - continue PTA Amiodarone and Metoprolol with parameters  -Resumed warfarin, INR is subtherapeutic at 1.37, continue warfarin, therapeutic Lovenox for now until INR therapeutic given increased risk of VTE .     #COPD: no current or recent exacerbations  - resume PTA Dulera inhaler     #HTN: initially elevated, continue PTA Amlodipine, Lisinopril, and Metoprolol with parameters.  May resume  hydrochlorothiazide .      #HLD: resumed PTA Atorvastatin     #Chronic pain syndrome: appears to be due to osteoarthritis for which the patient takes Tramadol intermittently for management.   -  PTA Tramadol for acute pain following fall PTA     #Obesity: Complicates care        Plan for today:    Continue supplemental oxygen, steroid, DVT prophylaxis    Discharge planning in progress-barrier to discharge include COVID diagnosis generalized weakness and need for TCU    Continue PT and OT daily here    Titrate  "insulin as above    Discussed with bedside nurse        CAMILA     Access : Peripheral     Muhammad Catheter: Not present        FEN :    Orders Placed This Encounter      Moderate Consistent Carb (60 g CHO per Meal) Diet           Intake/Output Summary (Last 24 hours) at 1/11/2022 1508  Last data filed at 1/11/2022 0652  Gross per 24 hour   Intake --   Output 350 ml   Net -350 ml          DVT Prophylaxis:     Warfarin    Code Status:      DNI    Estimated discharge  disposition and plan:   TCU in the next few days when bed is available        Interval History (Subjective):        Patient is seen and examined by me today and medical record reviewed.Overnight events noted and care discussed with nursing staff.  Patient is feeling better.  Denies any significant pain.  She has no chest pain or shortness of breath.  Discussed with bedside nurse Amanda.  I also spoke with care coordinators.            Physical Exam:        Last Vital Signs:  /55 (BP Location: Left arm)   Pulse 51   Temp 97.7  F (36.5  C) (Oral)   Resp 12   Ht 1.727 m (5' 8\")   Wt 86.2 kg (190 lb)   SpO2 92%   BMI 28.89 kg/m      I/O last 3 completed shifts:  In: 600 [P.O.:600]  Out: 550 [Urine:550]    Wt Readings from Last 5 Encounters:   01/11/22 86.2 kg (190 lb)   09/03/21 86 kg (189 lb 9.6 oz)        Constitutional: Awake, alert, cooperative, no apparent distress     Respiratory: Clear to auscultation bilaterally, no crackles or wheezing   Cardiovascular: Regular rate and rhythm, normal S1 and S2, and no murmur noted   Abdomen: Normal bowel sounds, soft, non-distended, non-tender   Skin: No new rashes, no cyanosis, dry to touch   Neuro: Alert with  no new focal weakness   Extremities:  Left upper extremity tenderness and right lower extremity calf tenderness   Other(s):        All other systems: Negative          Medications:        All current medications were reviewed with changes reflected in problem list.         Data:      All new lab and " imaging data was reviewed.      Data reviewed today: I reviewed all new labs and imaging results over the last 24 hours. I personally reviewed       Recent Labs   Lab 01/12/22  0751 01/11/22  1519 01/11/22  0717   WBC 3.7* 4.9 5.9   HGB 11.6* 11.1* 11.4*   HCT 36.8 36.4 37.1   MCV 89 92 92    202 212     No results for input(s): CULT in the last 168 hours.  Recent Labs   Lab 01/12/22  0900 01/12/22  0751 01/12/22  0054 01/11/22  1808 01/11/22  1357 01/11/22  1208 01/11/22  1051 01/11/22  0735 01/11/22  0717   NA  --  139  --   --  139  --   --   --  142   POTASSIUM  --  4.8  --   --  4.3  4.2  --  3.5  --  3.1*   CHLORIDE  --  107  --   --  105  --   --   --  106   CO2  --  28  --   --  27  --   --   --  30   ANIONGAP  --  4  --   --  7  --   --   --  6   * 285* 267*   < > 247*   < >  --    < > 93   BUN  --  24  --   --  30  --   --   --  25   CR  --  0.72  --   --  1.14*  --   --   --  1.02   GFRESTIMATED  --  85  --   --  49*  --   --   --  56*   NATACHA  --  8.9  --   --  8.5  --   --   --  9.1   PROTTOTAL  --   --   --   --  6.0*  --   --   --   --    ALBUMIN  --   --   --   --  2.6*  --   --   --   --    BILITOTAL  --   --   --   --  0.4  --   --   --   --    ALKPHOS  --   --   --   --  110  --   --   --   --    AST  --   --   --   --  75*  --   --   --   --    ALT  --   --   --   --  77*  --   --   --   --     < > = values in this interval not displayed.       Recent Labs   Lab 01/12/22 0900 01/12/22  0751 01/12/22  0054 01/11/22  2149 01/11/22  1808   * 285* 267* 303* 113*       Recent Labs   Lab 01/12/22  0751 01/11/22  1519 01/11/22  0717   INR 1.37* 1.42* 1.49*         No results for input(s): TROPONIN, TROPI, TROPR in the last 168 hours.    Invalid input(s): TROP, TROPONINIES    Recent Results (from the past 48 hour(s))   CT Cervical Spine w/o Contrast    Narrative    CT CERVICAL SPINE WITHOUT CONTRAST 1/10/2022 10:17 AM     HISTORY: Fall. Trauma. Headache.     TECHNIQUE: Axial images  of the cervical spine were obtained without  intravenous contrast. Multiplanar reformations were performed.   Radiation dose for this scan was reduced using automated exposure  control, adjustment of the mA and/or kV according to patient size, or  iterative reconstruction technique.    COMPARISON: None.    FINDINGS: Images are mildly degraded by motion artifact, particularly  within the mid cervical spine. This limits evaluation for subtle  fractures.    Mild grade 1 anterolisthesis of C4 on C5 likely due to degenerative  facet arthropathy. No definite acute lucent fracture line visualized  noting limitations by motion. No significant loss of vertebral body  height. No prevertebral soft tissue swelling.    Marked degenerative endplate changes and loss of disc height at C6-C7.  Moderate degenerative endplate changes and loss of disc height at the  other levels. Prominent facet hypertrophy in the mid cervical spine,  right slightly greater than left.    Mild spinal canal narrowings at C3-C4, C4-C5, and C6-C7. Multiple  levels of neural foraminal narrowing due to disc osteophytes and facet  hypertrophy, most pronounced at C3-C4, C4-C5, and C6-C7.    Visualized paraspinous tissues: Unremarkable.      Impression    IMPRESSION:   1. Images are mildly degraded by motion artifact which slightly limits  evaluation, particularly at the mid cervical spine.  2. No evidence of acute fracture noting mild limitations by motion.  3. Multilevel degenerative changes in the cervical spine as detailed  above.    Results including limitations of the study were discussed with  Rosetta Grady at 10:32 AM on 1/10/2022.     JOE BRAUN MD         SYSTEM ID:  [id number]   CT Head w/o Contrast    Narrative    CT SCAN OF THE HEAD WITHOUT CONTRAST   1/10/2022 10:18 AM     HISTORY: Trauma. Headache.    TECHNIQUE:  Axial images of the head and coronal reformations without  IV contrast material. Radiation dose for this scan was reduced  using  automated exposure control, adjustment of the mA and/or kV according  to patient size, or iterative reconstruction technique.    COMPARISON: None.    FINDINGS: There is no evidence of intracranial hemorrhage, mass, acute  infarct or anomaly. The ventricles are normal in size, shape and  configuration. Mild patchy periventricular white matter hypodensities  which are nonspecific, but likely related to chronic microvascular  ischemic disease.     The visualized portions of the sinuses and mastoids appear normal. The  bony calvarium and bones of the skull base appear intact.       Impression    IMPRESSION:     1. No evidence of acute intracranial hemorrhage, mass, or herniation.  2. Mild nonspecific white matter changes likely due to chronic  microvascular ischemic disease.     JOE BRAUN MD         SYSTEM ID:  [id number]   XR Pelvis and Hip Left 1 View    Narrative    XR PELVIS AND LEFT HIP ONE VIEW 1/10/2022 10:44 AM     INDICATION: Left hip pain after trauma.   COMPARISON: 9/2/2021.      Impression    IMPRESSION:  1.  Oblique lucency projecting over the left subcapital femoral neck.  This was present on the comparison radiograph (9-2-21), and is  slightly less conspicuous since that time. This finding likely  represents artifact (from overlapping osteophytes) or sequelae of old  healing fracture. If there is high clinical concern for acute hip  fracture, left hip CT is recommended in further evaluation.  2.  Advanced left hip degenerative arthrosis, unchanged.  3.  No joint malalignment.  4.  Degenerative changes in the lower lumbar spine.  5.  Right total hip arthroplasty.  6.  Bone demineralization.  7.  Atherosclerotic calcification.    GERALD RODRIGUEZ MD         SYSTEM ID:  [id number]   Elbow  XR, G/E 3 views, left    Narrative    LEFT ELBOW THREE OR MORE VIEWS  1/10/2022 10:44 AM     INDICATION: Left-sided elbow pain after trauma.     COMPARISON: None.      Impression    IMPRESSION:  1.  Normal joint  spacing and alignment.  2.  No fracture or joint effusion.  3.  Tiny olecranon enthesophyte.    GERALD RODRIGUEZ MD         SYSTEM ID:  [id number]   Radius/Ulna XR,  PA &LAT, left    Narrative    EXAM: FOREARM LEFT TWO VIEWS  DATE/TIME: 1/10/2022 10:45 AM     INDICATION: Left forearm pain after trauma.   COMPARISON: None.      Impression    IMPRESSION:  1.  No fracture or joint malalignment.  2.  Advanced thumb CMC degenerative arthrosis.  3.  Tiny olecranon enthesophyte.       GERALD RODRIGUEZ MD         SYSTEM ID:  [id number]   XR Chest Port 1 View    Narrative    CHEST ONE VIEW  1/10/2022 12:02 PM     HISTORY: Fever, weakness.    COMPARISON: None.      Impression    IMPRESSION: No acute disease.    LIZA HOWARD MD         SYSTEM ID:  [id number]   CT Hip Left w/o Contrast    Narrative    CT HIP LEFT WITHOUT CONTRAST 1/10/2022 1:22 PM    INDICATION: Left hip pain. Fall.  COMPARISON: Pelvis and left hip radiographic exam earlier today.  TECHNIQUE: Noncontrast. Axial, sagittal and coronal thin-section  reconstruction. Dose reduction techniques were used.   CONTRAST: None.    FINDINGS: Severe end-stage left hip osteoarthritis with bone-on-bone  abutment, large marginal osteophyte production and collapse of the  superolateral left femoral head articular surface. Loose bodies are  present within the inferomedial left hip joint. No acute displaced  fracture. No CT finding for classic femoral head osteonecrosis.    Right total hip arthroplasty in place without acute displaced  periprosthetic fracture. Degenerative change lower lumbar spine with  multilevel facet arthropathy and degenerative disc disease.  Degenerative change both sacroiliac joints with vacuum joint  phenomenon. Mild arthritis at the symphysis pubis. No concerning bone  lesion.    Atherosclerotic vascular disease. No evidence for an intestinal  obstruction. Absent uterus. Fat containing left inguinal hernia. No  sizable hip joint effusion. No significant  free intrapelvic fluid.      Impression    IMPRESSION:  1.  Severe end-stage left hip osteoarthritis.   2.  No evidence for occult lower lumbar spine, sacral, pelvic, or  proximal femur fracture. Specifically, no left hip fracture or  dislocation. MRI more sensitive to detect nondisplaced fractures.  3.  Right total hip arthroplasty.  4.  Arterial calcification.     SHAISTA CARLOS MD         SYSTEM ID:  [id number]       COVID Status:  COVID-19 PCR Results    COVID-19 PCR Results 9/2/21 1/10/22   SARS CoV2 PCR Negative Positive (A)   (A) Abnormal value       Comments are available for some flowsheets but are not being displayed.         COVID-19 Antibody Results, Testing for Immunity    COVID-19 Antibody Results, Testing for Immunity   No data to display.              Disclaimer: This note consists of symbols derived from keyboarding, dictation and/or voice recognition software. As a result, there may be errors in the script that have gone undetected. Please consider this when interpreting information found in this chart.

## 2022-01-12 NOTE — PLAN OF CARE
Patient alert and oriented  Peripheral IV saline locked- new dressing placed this evening  Tele: SB- low as high 40s  Other VSS, on 1L of O2- wean as able  Denies pain at rest, stiff and some pain with movement  Scheduled tylenol given with good results  Purewick in place intermittently  Able to move assist x1 with walker and gait belt  Bedside commode works when she has strength  BG checks, elevated, on sliding scale insulin  Potassium protocol in place, recheck ordered for AM  Continuing decadron for COVID, wean O2 as able, lovenox, PTA coumadin, rocephin for UTI    Dinner BG was 412, sliding scale insulin given, will recheck at at 1930 and page if >350 per orders.

## 2022-01-12 NOTE — PROVIDER NOTIFICATION
Critical Value: blood cultures positive for gram + bacilli resembling dipthroids     Dr Benitez pageravi at 1727    MD called floor at 1730, as patient is afebrile and on abx, will continue to monitor with no changes to POC at this time.

## 2022-01-12 NOTE — PROGRESS NOTES
Care Management Follow Up    Length of Stay (days): 2    Expected Discharge Date: 01/20/2022     Concerns to be Addressed:  (+) COVID will need support      Patient plan of care discussed at interdisciplinary rounds: Yes    Anticipated Discharge Disposition:  TBD   Pending progress      Anticipated Discharge Services:  PT/OT     Additional Information:  Left message for adult son.  SW would like to discuss possible barriers for d.c planning as there are NO Options in the [facility] area for (+) COVID placement     Per chart review pt is needing a lift.  Will ask Rehab staff to see daily       Corinne C. White, LSW

## 2022-01-13 NOTE — PROGRESS NOTES
Glucose greater than 400.  Will increase Lantus to 35 as this is still less than her PTA dose.  We will also give her an extra dose of NovoLog 6 units now.    Juan Kent, DO

## 2022-01-13 NOTE — PROGRESS NOTES
Updated from [facility] about patient's Positive Blood culture from 1/10/2022. When ran against a [employer], nothing was dectected. Will notify bedside RNs Kylah Rutledge and Daniella Matos.

## 2022-01-13 NOTE — PROVIDER NOTIFICATION
MD notified of pts blood sugar of 446 at 2011  Gave 35 units of Lantus and a one time dose of 6 units of Novolog at 2029     Blood sugar rechecked at 2215 was 397, MD notified.

## 2022-01-13 NOTE — PROGRESS NOTES
[facility]  Hospitalist Progress Note  Juan Manuel Benitez M.D., M.B.A.   01/13/2022    Reason for Stay/active problem list      Mechanical fall    Left arm hematoma    Acute COVID-19 infection    Acute hypoxic respiratory failure    Generalized weakness    UTI         Assessment and Plan:        Summary of Stay: Ruthy Becerril is a 81 year old female with PMH significant for COPD, DM2, HTN, HLD, chronic pain syndrome due to osteoarthritis, A-fib on Warfarin, and obesity who presents to the ED on 1/10/2022 for evaluation after a fall.     ED workup reveals: low-grade fever of 100.2, hypertensive, intermittently tachypneic, initial lactic acid of 3.5, glucose 173, leukocytosis of 11.7, INR of 2.66, EKG shows rate of 79 bpm in exhilarated junctional rhythm, nonspecific ST and T wave abnormality, CT of cervical spine image mildly degraded by motion artifact without evidence of acute fracture, CT of head negative for acute pathology, x-ray of left hip/pelvis shows oblique lucency projecting over the left subcapital femoral neck, likely represents artifact, right hip arthroplasty, x-ray of left elbow and forearm negative for acute pathology, blood cultures obtained and pending, UA shows 50 of protein, positive nitrites, negative LE, seven WBC, and few bacteria, and chest x-ray negative.       Problem List with Assessment and Plan:      1. Mechanical fall with left forearm hematoma    Likely secondary to generalized weakness and UTI as well as acute COVID-19 infection    PT OT consulted for assessment and recommendation-TCU recommended    Pain medications as needed      2. Acute COVID-19 infection      Breakthrough infection-vaccinated with [employer]    Mild symptoms with fever, chills, diarrhea, headache, mildly hypoxia requiring couple liters of oxygen    Positive SARS-CoV-2 PCR January 10    Chest x-ray clear    COVID isolation, continue dexamethasone, monitor hypoxia and  symptoms.      3. UTI      Acute complicated UTI with severe  Sepsis on the basis of elevated lactic acid -E. coli, pansensitive except for ampicillin    Afebrile ,    Received Zosyn in the emergency department, continue antibiotic with ceftriaxone for now     4.  Diabetes mellitus type 2    On home insulin Lantus 50 units daily, metformin, glipizide.  Hemoglobin A1c 7.7 in October 2021.    Resumed insulin at lower dose due to low blood sugars.blood glucose not well controlled, increase her Lantus to home dose.  Continue sliding scale insulin, glipizide.  Metformin on hold      5.  Right lower extremity pain:    DVT ruled out with ultrasound of lower extremities     Chronic medical problems:       #Paroxysmal A-fib on Warfarin: noted during last hospital stay in 09/2021 where patient converted on her own back to SR. Seen by cardiology during stay and initiated on Amiodarone and Warfarin.   - monitor on telemetry  - continue PTA Amiodarone and Metoprolol with parameters  -Resumed warfarin, INR is subtherapeutic at 1.37, continue warfarin, therapeutic Lovenox for now until INR therapeutic given increased risk of VTE .     #COPD: no current or recent exacerbations  - resumeD PTA Dulera inhaler     #HTN: initially elevated, continue PTA Amlodipine, Lisinopril, and Metoprolol with parameters.  resumed hydrochlorothiazide .      #HLD: resumed PTA Atorvastatin     #Chronic pain syndrome: appears to be due to osteoarthritis for which the patient takes Tramadol intermittently for management.   -  PTA Tramadol for acute pain following fall PTA     #Obesity: Complicates care        Plan for today:    Increase Lantus as above    Encouraged to participate in PT and OT sessions    Monitor for bradycardia, parameters entered for metoprolol dosing    Sleep medication        LDA     Access : Peripheral     Muhammad Catheter: Not present        FEN :    Orders Placed This Encounter      Moderate Consistent Carb (60 g CHO per Meal)  "Diet           Intake/Output Summary (Last 24 hours) at 1/11/2022 1508  Last data filed at 1/11/2022 0652  Gross per 24 hour   Intake --   Output 350 ml   Net -350 ml          DVT Prophylaxis:     Warfarin    Code Status:      DNI    Estimated discharge  disposition and plan:   TCU likely tomorrow        Interval History (Subjective):        Patient is seen and examined by me today and medical record reviewed.Overnight events noted and care discussed with nursing staff.  She was not doing well this morning.  She did not sleep very well.  She has some pain sent found to have some asymptomatic bradycardia episodes as well.  Denies any chest pain or shortness of breath.       Physical Exam:        Last Vital Signs:  /57 (BP Location: Left arm)   Pulse 52   Temp 98.5  F (36.9  C) (Oral)   Resp 16   Ht 1.727 m (5' 8\")   Wt 86.2 kg (190 lb)   SpO2 93%   BMI 28.89 kg/m      I/O last 3 completed shifts:  In: 600 [P.O.:600]  Out: 2400 [Urine:2400]    Wt Readings from Last 5 Encounters:   01/11/22 86.2 kg (190 lb)   09/03/21 86 kg (189 lb 9.6 oz)        Constitutional: Awake, alert, cooperative, no apparent distress     Respiratory: Clear to auscultation bilaterally, no crackles or wheezing   Cardiovascular: Regular rate and rhythm, normal S1 and S2, and no murmur noted   Abdomen: Normal bowel sounds, soft, non-distended, non-tender   Skin: No new rashes, no cyanosis, dry to touch   Neuro: Alert with  no new focal weakness   Extremities:  Left upper extremity tenderness and right lower extremity calf tenderness   Other(s):        All other systems: Negative          Medications:        All current medications were reviewed with changes reflected in problem list.         Data:      All new lab and imaging data was reviewed.      Data reviewed today: I reviewed all new labs and imaging results over the last 24 hours. I personally reviewed       Recent Labs   Lab 01/13/22  0856 01/12/22  0751 01/11/22  1519   WBC " 5.9 3.7* 4.9   HGB 11.0* 11.6* 11.1*   HCT 35.8 36.8 36.4   MCV 91 89 92    190 202     No results for input(s): CULT in the last 168 hours.  Recent Labs   Lab 01/13/22  0856 01/13/22  0158 01/12/22 2215 01/12/22  0900 01/12/22  0751 01/11/22  1808 01/11/22  1357     --   --   --  139  --  139   POTASSIUM 4.7  --   --   --  4.8  --  4.3  4.2   CHLORIDE 107  --   --   --  107  --  105   CO2 28  --   --   --  28  --  27   ANIONGAP 3  --   --   --  4  --  7   * 314* 397*   < > 285*   < > 247*   BUN 22  --   --   --  24  --  30   CR 0.63  --   --   --  0.72  --  1.14*   GFRESTIMATED 90  --   --   --  85  --  49*   NATACHA 8.7  --   --   --  8.9  --  8.5   PROTTOTAL  --   --   --   --   --   --  6.0*   ALBUMIN  --   --   --   --   --   --  2.6*   BILITOTAL  --   --   --   --   --   --  0.4   ALKPHOS  --   --   --   --   --   --  110   AST  --   --   --   --   --   --  75*   ALT  --   --   --   --   --   --  77*    < > = values in this interval not displayed.       Recent Labs   Lab 01/13/22  0856 01/13/22  0158 01/12/22 2215 01/12/22  1951 01/12/22  1756   * 314* 397* 446* 412*       Recent Labs   Lab 01/13/22  0856 01/12/22 0751 01/11/22  1519   INR 1.60* 1.37* 1.42*         No results for input(s): TROPONIN, TROPI, TROPR in the last 168 hours.    Invalid input(s): TROP, TROPONINIES    Recent Results (from the past 48 hour(s))   CT Cervical Spine w/o Contrast    Narrative    CT CERVICAL SPINE WITHOUT CONTRAST 1/10/2022 10:17 AM     HISTORY: Fall. Trauma. Headache.     TECHNIQUE: Axial images of the cervical spine were obtained without  intravenous contrast. Multiplanar reformations were performed.   Radiation dose for this scan was reduced using automated exposure  control, adjustment of the mA and/or kV according to patient size, or  iterative reconstruction technique.    COMPARISON: None.    FINDINGS: Images are mildly degraded by motion artifact, particularly  within the mid cervical spine.  This limits evaluation for subtle  fractures.    Mild grade 1 anterolisthesis of C4 on C5 likely due to degenerative  facet arthropathy. No definite acute lucent fracture line visualized  noting limitations by motion. No significant loss of vertebral body  height. No prevertebral soft tissue swelling.    Marked degenerative endplate changes and loss of disc height at C6-C7.  Moderate degenerative endplate changes and loss of disc height at the  other levels. Prominent facet hypertrophy in the mid cervical spine,  right slightly greater than left.    Mild spinal canal narrowings at C3-C4, C4-C5, and C6-C7. Multiple  levels of neural foraminal narrowing due to disc osteophytes and facet  hypertrophy, most pronounced at C3-C4, C4-C5, and C6-C7.    Visualized paraspinous tissues: Unremarkable.      Impression    IMPRESSION:   1. Images are mildly degraded by motion artifact which slightly limits  evaluation, particularly at the mid cervical spine.  2. No evidence of acute fracture noting mild limitations by motion.  3. Multilevel degenerative changes in the cervical spine as detailed  above.    Results including limitations of the study were discussed with  Rosetta Grady at 10:32 AM on 1/10/2022.     JEO BRAUN MD         SYSTEM ID:  [id number]   CT Head w/o Contrast    Narrative    CT SCAN OF THE HEAD WITHOUT CONTRAST   1/10/2022 10:18 AM     HISTORY: Trauma. Headache.    TECHNIQUE:  Axial images of the head and coronal reformations without  IV contrast material. Radiation dose for this scan was reduced using  automated exposure control, adjustment of the mA and/or kV according  to patient size, or iterative reconstruction technique.    COMPARISON: None.    FINDINGS: There is no evidence of intracranial hemorrhage, mass, acute  infarct or anomaly. The ventricles are normal in size, shape and  configuration. Mild patchy periventricular white matter hypodensities  which are nonspecific, but likely related to chronic  microvascular  ischemic disease.     The visualized portions of the sinuses and mastoids appear normal. The  bony calvarium and bones of the skull base appear intact.       Impression    IMPRESSION:     1. No evidence of acute intracranial hemorrhage, mass, or herniation.  2. Mild nonspecific white matter changes likely due to chronic  microvascular ischemic disease.     JOE BRAUN MD         SYSTEM ID:  [id number]   XR Pelvis and Hip Left 1 View    Narrative    XR PELVIS AND LEFT HIP ONE VIEW 1/10/2022 10:44 AM     INDICATION: Left hip pain after trauma.   COMPARISON: 9/2/2021.      Impression    IMPRESSION:  1.  Oblique lucency projecting over the left subcapital femoral neck.  This was present on the comparison radiograph (9-2-21), and is  slightly less conspicuous since that time. This finding likely  represents artifact (from overlapping osteophytes) or sequelae of old  healing fracture. If there is high clinical concern for acute hip  fracture, left hip CT is recommended in further evaluation.  2.  Advanced left hip degenerative arthrosis, unchanged.  3.  No joint malalignment.  4.  Degenerative changes in the lower lumbar spine.  5.  Right total hip arthroplasty.  6.  Bone demineralization.  7.  Atherosclerotic calcification.    GERALD RODRIGUEZ MD         SYSTEM ID:  [id number]   Elbow  XR, G/E 3 views, left    Narrative    LEFT ELBOW THREE OR MORE VIEWS  1/10/2022 10:44 AM     INDICATION: Left-sided elbow pain after trauma.     COMPARISON: None.      Impression    IMPRESSION:  1.  Normal joint spacing and alignment.  2.  No fracture or joint effusion.  3.  Tiny olecranon enthesophyte.    GERALD RODRIGUEZ MD         SYSTEM ID:  [id number]   Radius/Ulna XR,  PA &LAT, left    Narrative    EXAM: FOREARM LEFT TWO VIEWS  DATE/TIME: 1/10/2022 10:45 AM     INDICATION: Left forearm pain after trauma.   COMPARISON: None.      Impression    IMPRESSION:  1.  No fracture or joint malalignment.  2.  Advanced thumb CMC  degenerative arthrosis.  3.  Tiny olecranon enthesophyte.       GERALD RODRIGUEZ MD         SYSTEM ID:  [id number]   XR Chest Port 1 View    Narrative    CHEST ONE VIEW  1/10/2022 12:02 PM     HISTORY: Fever, weakness.    COMPARISON: None.      Impression    IMPRESSION: No acute disease.    LIZA HOWARD MD         SYSTEM ID:  [id number]   CT Hip Left w/o Contrast    Narrative    CT HIP LEFT WITHOUT CONTRAST 1/10/2022 1:22 PM    INDICATION: Left hip pain. Fall.  COMPARISON: Pelvis and left hip radiographic exam earlier today.  TECHNIQUE: Noncontrast. Axial, sagittal and coronal thin-section  reconstruction. Dose reduction techniques were used.   CONTRAST: None.    FINDINGS: Severe end-stage left hip osteoarthritis with bone-on-bone  abutment, large marginal osteophyte production and collapse of the  superolateral left femoral head articular surface. Loose bodies are  present within the inferomedial left hip joint. No acute displaced  fracture. No CT finding for classic femoral head osteonecrosis.    Right total hip arthroplasty in place without acute displaced  periprosthetic fracture. Degenerative change lower lumbar spine with  multilevel facet arthropathy and degenerative disc disease.  Degenerative change both sacroiliac joints with vacuum joint  phenomenon. Mild arthritis at the symphysis pubis. No concerning bone  lesion.    Atherosclerotic vascular disease. No evidence for an intestinal  obstruction. Absent uterus. Fat containing left inguinal hernia. No  sizable hip joint effusion. No significant free intrapelvic fluid.      Impression    IMPRESSION:  1.  Severe end-stage left hip osteoarthritis.   2.  No evidence for occult lower lumbar spine, sacral, pelvic, or  proximal femur fracture. Specifically, no left hip fracture or  dislocation. MRI more sensitive to detect nondisplaced fractures.  3.  Right total hip arthroplasty.  4.  Arterial calcification.     SHAISTA CARLOS MD         SYSTEM ID:  [id number]        COVID Status:  COVID-19 PCR Results    COVID-19 PCR Results 9/2/21 1/10/22   SARS CoV2 PCR Negative Positive (A)   (A) Abnormal value       Comments are available for some flowsheets but are not being displayed.         COVID-19 Antibody Results, Testing for Immunity    COVID-19 Antibody Results, Testing for Immunity   No data to display.              Disclaimer: This note consists of symbols derived from keyboarding, dictation and/or voice recognition software. As a result, there may be errors in the script that have gone undetected. Please consider this when interpreting information found in this chart.

## 2022-01-13 NOTE — PLAN OF CARE
End of Shift Summary  For vital signs and complete assessments, please see documentation flowsheets.     Pertinent assessments: A&Ox4. VSS. 1L O2. Increased pain in right hip. Purewick  in place. Last blood glucose 314. Up Ax1 to bedside commode.     Major Shift Events: None     Treatment Plan: Rocephin, Lovenox, decadron. Pain management.

## 2022-01-14 NOTE — PROGRESS NOTES
Cross cover:    Notified of blood sugar of 382.  Patient has significant insulin resistance at baseline, was just increased to her home dose of Lantus.  Currently on steroids with Decadron for COVID-19 which I suspect is accounting for her severe hyperglycemia.  -- Aspart 10 units 1 time now  -- To new glucose checks, hypoglycemia protocol  -- Rounding MD to follow-up later this morning.

## 2022-01-14 NOTE — PLAN OF CARE
End of Shift Summary  For vital signs and complete assessments, please see documentation flowsheets.     Pertinent assessments: A&Ox4. VSS. 1L O2. Increased pain in right hip, gave prn Tramadol and Voltaren gel. Purewick  in place, good UOP.  and 382.     Major Shift Events: None     Treatment Plan: BG management, wean O2 as tolerated. Lovenox, decadron, rocephin. Encourage ambulation and use of IS.

## 2022-01-14 NOTE — PROVIDER NOTIFICATION
DATE:  1/14/2022   TIME OF RECEIPT FROM LAB:  9921  LAB TEST:  Lactic  LAB VALUE:  4.5  RESULTS GIVEN WITH READ-BACK TO (PROVIDER):  Juan Manuel Benitez MD  TIME LAB VALUE REPORTED TO PROVIDER:   1136

## 2022-01-14 NOTE — PROVIDER NOTIFICATION
Pt blood sugar was 382 at 0100, MD notified.     Gave one time dose of 10 units of Novolog at 0130

## 2022-01-14 NOTE — PROGRESS NOTES
RRT called around 0800. Up with support staff to [facility] and became hypotensive, diaphoretic and pale. Lifted pt back to bed, bolus of LR was started and trendelenberg. After BP stabilized, LR was switched to 150mL/hr per provider. O2 stabilized on 3LO2 of oxymask. AxO4. Scattered bruising. Redness but blanchable buttock. Bedrest since RRT. Novolog 10 units administered x1 per provider. PICC placed, lab drawn and sent. Critical lactic paged to provider. Vascular was able to verify placement using P wave. CT done. Purewick in placed. Repo as tolerated. Complaint of back pain, voltaren gel and tylenol adm. Transferred to [facility] at 1400, report given to nurse at 1315.

## 2022-01-14 NOTE — PLAN OF CARE
Pertinent assessments: A&Ox4. VSS. 1L O2. Increased pain in right hip. Purewick  in place, good UOP. , 295, 386. New PIV in R wrist.    Major Shift Events: None     Treatment Plan: BG management, wean O2 as tolerated. Lovenox, decadron, rocephin. Encourage ambulation and use of IS.     Bedside Nurse: Shelli Beckwith RN

## 2022-01-14 NOTE — PROGRESS NOTES
[facility]  Hospitalist Progress Note  Juan Manuel Benitez M.D., M.B.A.   01/14/2022    Reason for Stay/active problem list      Mechanical fall    Left arm hematoma    Acute COVID-19 infection    Acute hypoxic respiratory failure    Generalized weakness    UTI    Sepsis          Assessment and Plan:        Summary of Stay: Ruthy Becerril is a 81 year old female with PMH significant for COPD, DM2, HTN, HLD, chronic pain syndrome due to osteoarthritis, A-fib on Warfarin, and obesity who presents to the ED on 1/10/2022 for evaluation after a fall.     ED workup reveals: low-grade fever of 100.2, hypertensive, intermittently tachypneic, initial lactic acid of 3.5, glucose 173, leukocytosis of 11.7, INR of 2.66, EKG shows rate of 79 bpm in exhilarated junctional rhythm, nonspecific ST and T wave abnormality, CT of cervical spine image mildly degraded by motion artifact without evidence of acute fracture, CT of head negative for acute pathology, x-ray of left hip/pelvis shows oblique lucency projecting over the left subcapital femoral neck, likely represents artifact, right hip arthroplasty, x-ray of left elbow and forearm negative for acute pathology, blood cultures obtained and pending, UA shows 50 of protein, positive nitrites, negative LE, seven WBC, and few bacteria, and chest x-ray negative.       Problem List with Assessment and Plan:      1. Mechanical fall with left forearm hematoma    Likely secondary to generalized weakness and UTI as well as acute COVID-19 infection    PT OT consulted for assessment and recommendation-TCU recommended    Pain medications as needed      2. Acute COVID-19 infection with hypoxic respiratory failure      Breakthrough infection-vaccinated with Lizandro [employer] Lizandro    Initial mild symptoms with fever, chills, diarrhea, headache, mildly hypoxia requiring couple liters of oxygen-currently requiring 15 L of oxygen via oxygen mask.  Rapid response on morning of January 14  due to dizziness and hypotension while getting to the bathroom and hypoxia  o She was seen urgently, evaluated for sudden change.  Nursing reported blood pressure of 80 systolic but with positioning and IV fluid bolus, blood pressure improved to 150s and 160s systolic.  She had cold extremities likely from decreased perfusion from orthostatic hypotension  o Twelve-lead EKG showed normal sinus rhythm without indication of acute coronary ischemia  o Multiple labs ordered-CBC, BMP, troponin, CPK, lactic acid  o Chest x-ray now and get CT of the chest to rule out pulm embolism when she is more stable  o Patient remains alert and oriented x3 complaining of back pain due to flat position improved with changing her position.  o Plan-continue IV fluid, follow pending labs, keep her on telemetry and may need to go to a monitored bed.  We will get CT of the chest if blood pressure remains stable.  Continue to monitor fluid.  Continue dexamethasone for now.  Hold remdesivir  o Plan of care discussed with bedside nurse    3. UTI      Acute complicated UTI with severe  Sepsis on the basis of elevated lactic acid -E. coli, pansensitive except for ampicillin    Afebrile ,    Received Zosyn in the emergency department, continue antibiotic with ceftriaxone for now     4.  Diabetes mellitus type 2    On home insulin Lantus 50 units daily, metformin, glipizide.  Hemoglobin A1c 7.7 in October 2021.    Resumed insulin at lower dose due to low blood sugars.blood glucose not well controlled, increase her Lantus to home dose.  Blood sugar remained elevated currently at 354.  Will cover with additional NovoLog and increase her Lantus to 55 units at bedtime.  We will check BMP and patient may need insulin drip if she is not diabetic ketoacidosis      Continue sliding scale insulin, glipizide.  Metformin on hold      5.  Right lower extremity pain:    DVT ruled out with ultrasound of lower extremities     Chronic medical  "problems:       #Paroxysmal A-fib on Warfarin: noted during last hospital stay in 09/2021 where patient converted on her own back to SR. Seen by cardiology during stay and initiated on Amiodarone and Warfarin.   - monitor on telemetry  - continue PTA Amiodarone and Metoprolol with parameters  -Resumed warfarin, INR is subtherapeutic at 1.6, continue warfarin, therapeutic Lovenox for now until INR therapeutic given increased risk of VTE .     #COPD: no current or recent exacerbations.  Not wheezing.  - resumeD PTA Dulera inhaler     #HTN: initially elevated, continue PTA Amlodipine, Lisinopril, and Metoprolol with parameters.  resumed hydrochlorothiazide      #HLD: resumed PTA Atorvastatin     #Chronic pain syndrome: appears to be due to osteoarthritis for which the patient takes Tramadol intermittently for management.  She has back pain  -  PTA Tramadol for acute pain following fall PTA     #Obesity: Complicates care    CODE STATUS blood sugar was a blood sugar blood sugars patient would like to get CPR but she does not want to be intubated    Plan for today:    Continue to monitor closely.  Check vital signs every 30 minutes for the next 2 hours and then every couple of hours    Follow-up pending labs-CBC, BMP, troponin, CPK    CT of the chest with contrast    Only update with her son-in-law Caity-trying to look for her telephone number.  Called the son but he was not taking a call    [place]    May need insulin drip     Addendum   -- /52 (BP Location: Left arm)   Pulse 65   Temp 97.3  F (36.3  C) (Oral)   Resp 16   Ht 1.727 m (5' 8\")   Wt 86.2 kg (190 lb)   SpO2 97%   BMI 28.89 kg/m    -- labs and cxr reviewed - possible pneumonia, lactate elevated 4.5,  hgb 8.2 down from 11 yesterday ,  no DKA   Plan   -- [place] [facility] [place] start insulin drip   -- CT chest   -- IVF bolus, repeat lactate in 2 hours   -- monitor closely     Addendum   -- [place] [facility] [place]  -- Was " "relatively stable until about 8 pm when she developed hypotension , her PICC site was oozing blood  RRT again   BP (!) 75/19 (BP Location: Left arm, Patient Position: Semi-Munoz's)   Pulse 72   Temp 96.9  F (36.1  C) (Axillary)   Resp 22   Ht 1.727 m (5' 8\")   Wt 86.2 kg (190 lb)   SpO2 96%   BMI 28.89 kg/m    -- pressure dressing applied   -- PICC stat called   -- IVF bolus 1L ordered   -- plan     place fox , trendelenburg position ,Stat labs - cbc , bmp , lactate , hold BP Medications , hold warfarin , type and cross match , tried and failed to connect with family     Severe sepsis /septic shock - elevated lactate , BC on  1/10  Positive on the 3rd day of incubation Abnormal  , Corynebacterium species Panic  UC on 1/10 E coli - on ceftriaxone,  Will add vancomycin , geT ID consult       Will start insulin drip     Consider Pressor if MAP remains lower than 60         Addendum   -- Lactate came back at 14 -   -- additional litre of fluid ordered   -- may need to start Pressor   --code status verified again - DNI but CPR okay       Addendum   -- Hgb came back at 6.6 , Scr 1.58. normal K , Bicarb o f15    -- suspect acute blood loss anemia - unclear etiology doubt significant loss from  PICC site bleed -pressure dressing applied   BP (!) 83/45 (BP Location: Left arm)   Pulse 92   Temp 96.9  F (36.1  C) (Axillary)   Resp 22   Ht 1.727 m (5' 8\")   Wt 86.2 kg (190 lb)   SpO2 96%   BMI 28.89 kg/m         -- plan     Transfuse a unit of blood , ordered     Additional litre of fluid     Start levophed if she remains hypotensive     Care signed out to Dr Katt JENSEN     Access : Peripheral     Fox Catheter: Not present        FEN :    Orders Placed This Encounter      Moderate Consistent Carb (60 g CHO per Meal) Diet           Intake/Output Summary (Last 24 hours) at 1/11/2022 1508  Last data filed at 1/11/2022 0652  Gross per 24 hour   Intake --   Output 350 ml   Net -350 ml          DVT " "Prophylaxis:     Warfarin    Code Status:      DNI    Estimated discharge  disposition and plan: Unable to determine.        Interval History (Subjective):        Patient is seen and examined by me today and medical record reviewed.Overnight events noted and care discussed with nursing staff.  Patient was cold and clammy this morning and hypotensive while she was walking to the bathroom.  She denies chest pain but she has back pain.  She was put flat position and was given IV fluid bolus which improved her blood pressure.  She was also hypoxic improved with oxygen with oxygen mask.  EKG done at bedside which showed normal sinus rhythm.  Labs and studies pending.       Physical Exam:        Last Vital Signs:  BP (!) 156/70 (BP Location: Left arm)   Pulse 69   Temp (!) 96.7  F (35.9  C) (Rectal)   Resp 18   Ht 1.727 m (5' 8\")   Wt 86.2 kg (190 lb)   SpO2 100%   BMI 28.89 kg/m      I/O last 3 completed shifts:  In: 250 [P.O.:250]  Out: 2550 [Urine:2550]    Wt Readings from Last 5 Encounters:   01/11/22 86.2 kg (190 lb)   09/03/21 86 kg (189 lb 9.6 oz)        Constitutional: Awake, alert, cooperative, no apparent distress     Respiratory: Clear to auscultation bilaterally, no crackles or wheezing   Cardiovascular: Regular rate and rhythm, normal S1 and S2, and no murmur noted   Abdomen: Normal bowel sounds, soft, non-distended, non-tender   Skin: No new rashes, no cyanosis, dry to touch   Neuro: Alert with  no new focal weakness   Extremities:  Left upper extremity tenderness and right lower extremity calf tenderness   Other(s):        All other systems: Negative          Medications:        All current medications were reviewed with changes reflected in problem list.         Data:      All new lab and imaging data was reviewed.      Data reviewed today: I reviewed all new labs and imaging results over the last 24 hours. I personally reviewed       Recent Labs   Lab 01/13/22  0856 01/12/22  0751 01/11/22  1519 "   WBC 5.9 3.7* 4.9   HGB 11.0* 11.6* 11.1*   HCT 35.8 36.8 36.4   MCV 91 89 92    190 202     No results for input(s): CULT in the last 168 hours.  Recent Labs   Lab 01/14/22 0803 01/14/22 0054 01/13/22 2103 01/13/22  1346 01/13/22  0856 01/12/22  0900 01/12/22  0751 01/11/22  1808 01/11/22  1357   NA  --   --   --   --  138  --  139  --  139   POTASSIUM  --   --   --   --  4.7  --  4.8  --  4.3  4.2   CHLORIDE  --   --   --   --  107  --  107  --  105   CO2  --   --   --   --  28  --  28  --  27   ANIONGAP  --   --   --   --  3  --  4  --  7   * 382* 414*   < > 299*   < > 285*   < > 247*   BUN  --   --   --   --  22  --  24  --  30   CR  --   --   --   --  0.63  --  0.72  --  1.14*   GFRESTIMATED  --   --   --   --  90  --  85  --  49*   NATACHA  --   --   --   --  8.7  --  8.9  --  8.5   PROTTOTAL  --   --   --   --   --   --   --   --  6.0*   ALBUMIN  --   --   --   --   --   --   --   --  2.6*   BILITOTAL  --   --   --   --   --   --   --   --  0.4   ALKPHOS  --   --   --   --   --   --   --   --  110   AST  --   --   --   --   --   --   --   --  75*   ALT  --   --   --   --   --   --   --   --  77*    < > = values in this interval not displayed.       Recent Labs   Lab 01/14/22 0803 01/14/22 0054 01/13/22 2103 01/13/22  1717 01/13/22  1346   * 382* 414* 386* 295*       Recent Labs   Lab 01/13/22  0856 01/12/22  0751 01/11/22  1519   INR 1.60* 1.37* 1.42*         No results for input(s): TROPONIN, TROPI, TROPR in the last 168 hours.    Invalid input(s): TROP, TROPONINIES    Recent Results (from the past 48 hour(s))   CT Cervical Spine w/o Contrast    Narrative    CT CERVICAL SPINE WITHOUT CONTRAST 1/10/2022 10:17 AM     HISTORY: Fall. Trauma. Headache.     TECHNIQUE: Axial images of the cervical spine were obtained without  intravenous contrast. Multiplanar reformations were performed.   Radiation dose for this scan was reduced using automated exposure  control, adjustment of the mA  and/or kV according to patient size, or  iterative reconstruction technique.    COMPARISON: None.    FINDINGS: Images are mildly degraded by motion artifact, particularly  within the mid cervical spine. This limits evaluation for subtle  fractures.    Mild grade 1 anterolisthesis of C4 on C5 likely due to degenerative  facet arthropathy. No definite acute lucent fracture line visualized  noting limitations by motion. No significant loss of vertebral body  height. No prevertebral soft tissue swelling.    Marked degenerative endplate changes and loss of disc height at C6-C7.  Moderate degenerative endplate changes and loss of disc height at the  other levels. Prominent facet hypertrophy in the mid cervical spine,  right slightly greater than left.    Mild spinal canal narrowings at C3-C4, C4-C5, and C6-C7. Multiple  levels of neural foraminal narrowing due to disc osteophytes and facet  hypertrophy, most pronounced at C3-C4, C4-C5, and C6-C7.    Visualized paraspinous tissues: Unremarkable.      Impression    IMPRESSION:   1. Images are mildly degraded by motion artifact which slightly limits  evaluation, particularly at the mid cervical spine.  2. No evidence of acute fracture noting mild limitations by motion.  3. Multilevel degenerative changes in the cervical spine as detailed  above.    Results including limitations of the study were discussed with  Rosetta Grady at 10:32 AM on 1/10/2022.     JOE BRAUN MD         SYSTEM ID:  [id number]   CT Head w/o Contrast    Narrative    CT SCAN OF THE HEAD WITHOUT CONTRAST   1/10/2022 10:18 AM     HISTORY: Trauma. Headache.    TECHNIQUE:  Axial images of the head and coronal reformations without  IV contrast material. Radiation dose for this scan was reduced using  automated exposure control, adjustment of the mA and/or kV according  to patient size, or iterative reconstruction technique.    COMPARISON: None.    FINDINGS: There is no evidence of intracranial hemorrhage,  mass, acute  infarct or anomaly. The ventricles are normal in size, shape and  configuration. Mild patchy periventricular white matter hypodensities  which are nonspecific, but likely related to chronic microvascular  ischemic disease.     The visualized portions of the sinuses and mastoids appear normal. The  bony calvarium and bones of the skull base appear intact.       Impression    IMPRESSION:     1. No evidence of acute intracranial hemorrhage, mass, or herniation.  2. Mild nonspecific white matter changes likely due to chronic  microvascular ischemic disease.     JOE BRAUN MD         SYSTEM ID:  [id number]   XR Pelvis and Hip Left 1 View    Narrative    XR PELVIS AND LEFT HIP ONE VIEW 1/10/2022 10:44 AM     INDICATION: Left hip pain after trauma.   COMPARISON: 9/2/2021.      Impression    IMPRESSION:  1.  Oblique lucency projecting over the left subcapital femoral neck.  This was present on the comparison radiograph (9-2-21), and is  slightly less conspicuous since that time. This finding likely  represents artifact (from overlapping osteophytes) or sequelae of old  healing fracture. If there is high clinical concern for acute hip  fracture, left hip CT is recommended in further evaluation.  2.  Advanced left hip degenerative arthrosis, unchanged.  3.  No joint malalignment.  4.  Degenerative changes in the lower lumbar spine.  5.  Right total hip arthroplasty.  6.  Bone demineralization.  7.  Atherosclerotic calcification.    GERALD RODRIGUEZ MD         SYSTEM ID:  [id number]   Elbow  XR, G/E 3 views, left    Narrative    LEFT ELBOW THREE OR MORE VIEWS  1/10/2022 10:44 AM     INDICATION: Left-sided elbow pain after trauma.     COMPARISON: None.      Impression    IMPRESSION:  1.  Normal joint spacing and alignment.  2.  No fracture or joint effusion.  3.  Tiny olecranon enthesophyte.    GERALD RODRIGUEZ MD         SYSTEM ID:  [id number]   Radius/Ulna XR,  PA &LAT, left    Narrative    EXAM: FOREARM LEFT TWO  VIEWS  DATE/TIME: 1/10/2022 10:45 AM     INDICATION: Left forearm pain after trauma.   COMPARISON: None.      Impression    IMPRESSION:  1.  No fracture or joint malalignment.  2.  Advanced thumb CMC degenerative arthrosis.  3.  Tiny olecranon enthesophyte.       GERALD RODRIGUEZ MD         SYSTEM ID:  [id number]   XR Chest Port 1 View    Narrative    CHEST ONE VIEW  1/10/2022 12:02 PM     HISTORY: Fever, weakness.    COMPARISON: None.      Impression    IMPRESSION: No acute disease.    LIZA HOWARD MD         SYSTEM ID:  [id number]   CT Hip Left w/o Contrast    Narrative    CT HIP LEFT WITHOUT CONTRAST 1/10/2022 1:22 PM    INDICATION: Left hip pain. Fall.  COMPARISON: Pelvis and left hip radiographic exam earlier today.  TECHNIQUE: Noncontrast. Axial, sagittal and coronal thin-section  reconstruction. Dose reduction techniques were used.   CONTRAST: None.    FINDINGS: Severe end-stage left hip osteoarthritis with bone-on-bone  abutment, large marginal osteophyte production and collapse of the  superolateral left femoral head articular surface. Loose bodies are  present within the inferomedial left hip joint. No acute displaced  fracture. No CT finding for classic femoral head osteonecrosis.    Right total hip arthroplasty in place without acute displaced  periprosthetic fracture. Degenerative change lower lumbar spine with  multilevel facet arthropathy and degenerative disc disease.  Degenerative change both sacroiliac joints with vacuum joint  phenomenon. Mild arthritis at the symphysis pubis. No concerning bone  lesion.    Atherosclerotic vascular disease. No evidence for an intestinal  obstruction. Absent uterus. Fat containing left inguinal hernia. No  sizable hip joint effusion. No significant free intrapelvic fluid.      Impression    IMPRESSION:  1.  Severe end-stage left hip osteoarthritis.   2.  No evidence for occult lower lumbar spine, sacral, pelvic, or  proximal femur fracture. Specifically, no left hip  fracture or  dislocation. MRI more sensitive to detect nondisplaced fractures.  3.  Right total hip arthroplasty.  4.  Arterial calcification.     SHAISTA CARLOS MD         SYSTEM ID:  [id number]       COVID Status:  COVID-19 PCR Results    COVID-19 PCR Results 9/2/21 1/10/22   SARS CoV2 PCR Negative Positive (A)   (A) Abnormal value       Comments are available for some flowsheets but are not being displayed.         COVID-19 Antibody Results, Testing for Immunity    COVID-19 Antibody Results, Testing for Immunity   No data to display.              Disclaimer: This note consists of symbols derived from keyboarding, dictation and/or voice recognition software. As a result, there may be errors in the script that have gone undetected. Please consider this when interpreting information found in this chart.

## 2022-01-14 NOTE — PROCEDURES
[facility]    Double Lumen PICC Placement    Date/Time: 1/14/2022 10:50 AM  Performed by: Keshav Fortune RN  Authorized by: Juan Manuel Benitez MD   Indications: vascular access      UNIVERSAL PROTOCOL   Site Marked: Yes  Prior Images Obtained and Reviewed:  Yes  Required items: Required blood products, implants, devices and special equipment available    Patient identity confirmed:  Verbally with patient, arm band and hospital-assigned identification number  NA - No sedation, light sedation, or local anesthesia  Confirmation Checklist:  Patient's identity using two indicators, relevant allergies, procedure was appropriate and matched the consent or emergent situation and correct equipment/implants were available  Time out: Immediately prior to the procedure a time out was called    Universal Protocol: the Joint Commission Universal Protocol was followed    Preparation: Patient was prepped and draped in usual sterile fashion       ANESTHESIA    Anesthesia: Local infiltration  Local Anesthetic:  Lidocaine 1% without epinephrine  Anesthetic Total (mL):  1      SEDATION    Patient Sedated: No        Preparation: skin prepped with ChloraPrep  Skin prep agent: skin prep agent completely dried prior to procedure  Sterile barriers: maximum sterile barriers were used: cap, mask, sterile gown, sterile gloves, and large sterile sheet  Hand hygiene: hand hygiene performed prior to central venous catheter insertion  Type of line used: Power PICC  Catheter type: double lumen  Lumen type: valved  Catheter size: 5 Fr  Brand: Bard  Lot number: [id number]  Placement method: venipuncture, ultrasound and tip confirmation system  Number of attempts: 1  Successful placement: yes  Orientation: right  Location: basilic vein (4.5mm)  Arm circumference: adults 10 cm  Extremity circumference: 34  Visible catheter length: 0  Internal length: 37 cm  Total catheter length: 37  Dressing and securement: blood cleaned with CHG,  blood removed, chlorhexidine disc applied, fixation device, line secured, occlusive dressing applied, statlock and sterile dressing applied  Post procedure assessment: blood return through all ports  PROCEDURE   Patient Tolerance:  Patient tolerated the procedure well with no immediate complications   PICC ok to use, verified with 3cg Tip Confirmation, Tammy Degroot RN notified

## 2022-01-15 VITALS
OXYGEN SATURATION: 65 % | SYSTOLIC BLOOD PRESSURE: 243 MMHG | HEIGHT: 68 IN | WEIGHT: 190 LBS | TEMPERATURE: 96.9 F | DIASTOLIC BLOOD PRESSURE: 118 MMHG | BODY MASS INDEX: 28.79 KG/M2

## 2022-01-15 LAB — BACTERIA BLD CULT: NO GROWTH

## 2022-01-15 RX ORDER — EPINEPHRINE IN SOD CHLOR,ISO 1 MG/10 ML
1 SYRINGE (ML) INTRAVENOUS ONCE
Status: DISCONTINUED | OUTPATIENT
Start: 2022-01-15 | End: 2022-01-15 | Stop reason: HOSPADM

## 2022-01-15 RX ORDER — EPINEPHRINE IN SOD CHLOR,ISO 1 MG/10 ML
SYRINGE (ML) INTRAVENOUS PRN
Status: COMPLETED | OUTPATIENT
Start: 2022-01-01 | End: 2022-01-01

## 2022-01-15 ASSESSMENT — ACTIVITIES OF DAILY LIVING (ADL)
ADLS_ACUITY_SCORE: 12

## 2022-01-15 NOTE — PROVIDER NOTIFICATION
DATE:  1/14/2022   TIME OF RECEIPT FROM LAB:  8490  LAB TEST:  Hgb  LAB VALUE:  6.6  RESULTS GIVEN WITH READ-BACK TO (PROVIDER):  Dr. Benitez   TIME LAB VALUE REPORTED TO PROVIDER:   6998

## 2022-01-15 NOTE — PHARMACY-VANCOMYCIN DOSING SERVICE
Pharmacy Vancomycin Initial Note  Date of Service 2022  Patient's  1940  81 year old, female    Indication: Sepsis    Current estimated CrCl = Estimated Creatinine Clearance: 33.2 mL/min (A) (based on SCr of 1.58 mg/dL (H)).    Creatinine for last 3 days  2022:  7:51 AM Creatinine 0.72 mg/dL  2022:  8:56 AM Creatinine 0.63 mg/dL  2022:  8:25 AM Creatinine 0.83 mg/dL;  8:39 PM Creatinine 1.58 mg/dL    Recent Vancomycin Level(s) for last 3 days  No results found for requested labs within last 72 hours.      Vancomycin IV Administrations (past 72 hours)      No vancomycin orders with administrations in past 72 hours.                Nephrotoxins and other renal medications (From now, onward)    Start     Dose/Rate Route Frequency Ordered Stop    01/15/22 2200  vancomycin (VANCOCIN) 1000 mg in dextrose 5% 200 mL PREMIX         1,000 mg  200 mL/hr over 1 Hours Intravenous EVERY 24 HOURS 22 2145      22 2130  vancomycin 1500 mg in 0.9% NaCl 250 ml intermittent infusion 1,500 mg         1,500 mg  over 90 Minutes Intravenous ONCE 22 21022 0900  [Held by provider]  lisinopril-hydrochlorothiazide (ZESTORETIC) 20-12.5 MG per tablet 2 tablet        (Held by provider since 2022 at 2036 by Juan Manuel Benitez MD.Hold Reason: Change in Vitals.)    2 tablet Oral DAILY 22 1040            Contrast Orders - past 72 hours (72h ago, onward)            Start     Dose/Rate Route Frequency Ordered Stop    22 1200  iopamidol (ISOVUE-370) solution 500 mL         500 mL Intravenous ONCE 22 1144 22 1144          [employer] Prediction of Planned Initial Vancomycin Regimen  Loading dose: 1500 mg at 21:30 2021.  Regimen: 1000 mg IV every 24 hours.  Start time: 2200 on 01/15/2022  Exposure target: AUC24 (range)400-600 mg/L.hr   AUC24,ss: 512 mg/L.hr  Probability of AUC24 > 400: 77 %  Ctrough,ss: 16.9 mg/L  Probability of Ctrough,ss > 20: 33  %  Probability of nephrotoxicity (Lodise EMILIE 2009): 13 %          Plan:  1. Start vancomycin  1000 mg IV q24h.   2. Vancomycin monitoring method: AUC  3. Vancomycin therapeutic monitoring goal: 400-600 mg*h/L  4. Pharmacy will check vancomycin levels as appropriate in 3-5 Days.    5. Serum creatinine levels will be ordered daily for the first week of therapy and at least twice weekly for subsequent weeks.      Rigoberto Dejesus RP

## 2022-01-15 NOTE — PROVIDER NOTIFICATION
Lactic acid of 14.3.  Dr Benitez is on the unit currently, this writer verbally notified him of the critical lab.

## 2022-01-15 NOTE — PLAN OF CARE
A&Ox4. Hypotensive with bp 98/40, md notified, continue to monitor. / . Pain in back and abdomen treated with voltaren gel and tramadol. Zofran given for nausea. Pt has not been out of bed since arriving from [facility] at 2pm. Abx IV rocephin. Dressing change done on PICC line. PT following. Lovenox for DVT prophylaxis.

## 2022-01-15 NOTE — PROVIDER NOTIFICATION
"Web based page at 0210 to hospitalist: I was able to get a hold of son Kaden but he was very shocked and hung up very shortly. Do you want to give him a call explaining things more in depth?    (Text below describes conversation but was not included in page to MD)    Spoke with patient's son Kaden on phone, informed that patient's mother was \"very sick and that she received a lot of medications to help her get better, but unfortunately it wasn't enough and she has passed away.\" Apologized to Kaden for telling him in this way, he sounded shocked and distressed and stated \"Oh my god\" and stated that he would make arrangements. Kaden hung up shortly after this.    "

## 2022-01-15 NOTE — CODE/RAPID RESPONSE
Responded to Code Blue. Pt was noted to have v fib. She was defibrillated, CPR was initiated and given DNI was not intubated. Several doses of IV epi were administered along with high quality CPR. The patient was noted to have asystole throughout. Code ran for approx 22 minutes.  Time of death : 11 -50 pm. Pupils fixed and dilated, heart no tones. No spontaneous respirations.  Dr Benitez attempting to get in touch with family.    Addendum.  - I was unable to get in touch with son, Kaden after charge RN spoke to his son.  - VM left.

## 2022-01-17 NOTE — DISCHARGE SUMMARY
Physician Discharge Summary           [facility]  Hospitalist Discharge Summary-[facility]    Name: Ruthy Becerril    MRN: [id number]     YOB: 1940    Age: 81 year old                                                     Primary care provider: Gary Liz    Admit date:  1/10/2022    Discharge date and time: 1/15/2022  3:35 AM    Discharge Physician: Juan Manuel Benitez M.D., M.B.A.       Primary Discharge Diagnosis      Mechanical fall  Left arm hematoma  Acute COVID-19 infection  Acute hypoxic respiratory failure  Generalized weakness  UTI  Severe sepsis and septic shock   Ventricular fibrillation with arrest, failed  resuscitation effort.  Please review note from Dr. Bolivar on 1/14/2021 at 11:56 PM.  Underlying CAD based on extensive coronary calcification       Secondary Diagnosis /chronic medical conditions     Past Medical History:   Diagnosis Date     COPD (chronic obstructive pulmonary disease) (H)      DM2 (diabetes mellitus, type 2) (H)      HLD (hyperlipidemia)      HTN (hypertension)      Paroxysmal atrial fibrillation (H)     on Wafarin           Brief Summary of Hospital stay :       Please refer to  Admission H&P note for full details of patient presentation.    Reason for Hospitalization(C/C,HPI and brief patient summary):evaluation after a fall.      Significant findings(Primary diagnosis )Procedures and treatments provided(Hospital course ,consults, procedures):Please see below for details     Ruthy Becerril is a 81 year old female with PMH significant for COPD, DM2, HTN, HLD, chronic pain syndrome due to osteoarthritis, A-fib on Warfarin, and obesity who presents to the ED on 1/10/2022 for evaluation after a fall.     ED workup reveals: low-grade fever of 100.2, hypertensive, intermittently tachypneic, initial lactic acid of 3.5, glucose 173, leukocytosis of 11.7, INR of 2.66, EKG shows rate of 79 bpm in exhilarated junctional rhythm, nonspecific ST and T  wave abnormality, CT of cervical spine image mildly degraded by motion artifact without evidence of acute fracture, CT of head negative for acute pathology, x-ray of left hip/pelvis shows oblique lucency projecting over the left subcapital femoral neck, likely represents artifact, right hip arthroplasty, x-ray of left elbow and forearm negative for acute pathology, blood cultures obtained and pending, UA shows 50 of protein, positive nitrites, negative LE, seven WBC, and few bacteria, and chest x-ray negative.        Ruthy had a complex medical problems and she was closely monitored in the hospital.  Please review my documentation regarding this patient's care during the stay.  Please refer to my progress note with subsequent addendum's documented on 2022 at 8:37 AM.  Patient rapidly deteriorated during the day and multiple attempts to reach out to the family were not successful as there were no immediate family for updates over the phone.  Patient care was signed out to the evening hospitalist in critical condition.  Unfortunately , Ruthy went into cardiac arrest later that night and CODE BLUE was called with unsuccessful resuscitation effort.    Most likely primary cause of death: Septic shock  Other contributing cause of death    Acute COVID-19 infection with hypoxic respiratory failure    Urinary tract infection with E. Coli       Consultations during hospital stay:       CARE MANAGEMENT / SOCIAL WORK IP CONSULT  CARE MANAGEMENT / SOCIAL WORK IP CONSULT  PHYSICAL THERAPY ADULT IP CONSULT  PHARMACY TO DOSE WARFARIN  VASCULAR ACCESS ADULT IP CONSULT  PHARMACY IP CONSULT  PHARMACY TO DOSE VANCO  PHARMACY IP CONSULT      Patient discharge Condition:            Major procedure performed/  Significant Diagnostic Studies:       Results for orders placed or performed during the hospital encounter of 01/10/22   CT Head w/o Contrast    Narrative    CT SCAN OF THE HEAD WITHOUT CONTRAST   1/10/2022 10:18 AM      HISTORY: Trauma. Headache.    TECHNIQUE:  Axial images of the head and coronal reformations without  IV contrast material. Radiation dose for this scan was reduced using  automated exposure control, adjustment of the mA and/or kV according  to patient size, or iterative reconstruction technique.    COMPARISON: None.    FINDINGS: There is no evidence of intracranial hemorrhage, mass, acute  infarct or anomaly. The ventricles are normal in size, shape and  configuration. Mild patchy periventricular white matter hypodensities  which are nonspecific, but likely related to chronic microvascular  ischemic disease.     The visualized portions of the sinuses and mastoids appear normal. The  bony calvarium and bones of the skull base appear intact.       Impression    IMPRESSION:     1. No evidence of acute intracranial hemorrhage, mass, or herniation.  2. Mild nonspecific white matter changes likely due to chronic  microvascular ischemic disease.     JOE BRAUN MD         SYSTEM ID:  [id number]   CT Cervical Spine w/o Contrast    Narrative    CT CERVICAL SPINE WITHOUT CONTRAST 1/10/2022 10:17 AM     HISTORY: Fall. Trauma. Headache.     TECHNIQUE: Axial images of the cervical spine were obtained without  intravenous contrast. Multiplanar reformations were performed.   Radiation dose for this scan was reduced using automated exposure  control, adjustment of the mA and/or kV according to patient size, or  iterative reconstruction technique.    COMPARISON: None.    FINDINGS: Images are mildly degraded by motion artifact, particularly  within the mid cervical spine. This limits evaluation for subtle  fractures.    Mild grade 1 anterolisthesis of C4 on C5 likely due to degenerative  facet arthropathy. No definite acute lucent fracture line visualized  noting limitations by motion. No significant loss of vertebral body  height. No prevertebral soft tissue swelling.    Marked degenerative endplate changes and loss of disc  height at C6-C7.  Moderate degenerative endplate changes and loss of disc height at the  other levels. Prominent facet hypertrophy in the mid cervical spine,  right slightly greater than left.    Mild spinal canal narrowings at C3-C4, C4-C5, and C6-C7. Multiple  levels of neural foraminal narrowing due to disc osteophytes and facet  hypertrophy, most pronounced at C3-C4, C4-C5, and C6-C7.    Visualized paraspinous tissues: Unremarkable.      Impression    IMPRESSION:   1. Images are mildly degraded by motion artifact which slightly limits  evaluation, particularly at the mid cervical spine.  2. No evidence of acute fracture noting mild limitations by motion.  3. Multilevel degenerative changes in the cervical spine as detailed  above.    Results including limitations of the study were discussed with  Rosetta Grady at 10:32 AM on 1/10/2022.     JOE BRAUN MD         SYSTEM ID:  [id number]   XR Pelvis and Hip Left 1 View    Narrative    XR PELVIS AND LEFT HIP ONE VIEW 1/10/2022 10:44 AM     INDICATION: Left hip pain after trauma.   COMPARISON: 9/2/2021.      Impression    IMPRESSION:  1.  Oblique lucency projecting over the left subcapital femoral neck.  This was present on the comparison radiograph (9-2-21), and is  slightly less conspicuous since that time. This finding likely  represents artifact (from overlapping osteophytes) or sequelae of old  healing fracture. If there is high clinical concern for acute hip  fracture, left hip CT is recommended in further evaluation.  2.  Advanced left hip degenerative arthrosis, unchanged.  3.  No joint malalignment.  4.  Degenerative changes in the lower lumbar spine.  5.  Right total hip arthroplasty.  6.  Bone demineralization.  7.  Atherosclerotic calcification.    GERALD RODRIGUEZ MD         SYSTEM ID:  [id number]   Elbow  XR, G/E 3 views, left    Narrative    LEFT ELBOW THREE OR MORE VIEWS  1/10/2022 10:44 AM     INDICATION: Left-sided elbow pain after trauma.      COMPARISON: None.      Impression    IMPRESSION:  1.  Normal joint spacing and alignment.  2.  No fracture or joint effusion.  3.  Tiny olecranon enthesophyte.    GERALD RODRIGUEZ MD         SYSTEM ID:  [id number]   Radius/Ulna XR,  PA &LAT, left    Narrative    EXAM: FOREARM LEFT TWO VIEWS  DATE/TIME: 1/10/2022 10:45 AM     INDICATION: Left forearm pain after trauma.   COMPARISON: None.      Impression    IMPRESSION:  1.  No fracture or joint malalignment.  2.  Advanced thumb CMC degenerative arthrosis.  3.  Tiny olecranon enthesophyte.       GERALD RODRIGUEZ MD         SYSTEM ID:  [id number]   XR Chest Port 1 View    Narrative    CHEST ONE VIEW  1/10/2022 12:02 PM     HISTORY: Fever, weakness.    COMPARISON: None.      Impression    IMPRESSION: No acute disease.    LIZA HOWARD MD         SYSTEM ID:  [id number]   CT Hip Left w/o Contrast    Narrative    CT HIP LEFT WITHOUT CONTRAST 1/10/2022 1:22 PM    INDICATION: Left hip pain. Fall.  COMPARISON: Pelvis and left hip radiographic exam earlier today.  TECHNIQUE: Noncontrast. Axial, sagittal and coronal thin-section  reconstruction. Dose reduction techniques were used.   CONTRAST: None.    FINDINGS: Severe end-stage left hip osteoarthritis with bone-on-bone  abutment, large marginal osteophyte production and collapse of the  superolateral left femoral head articular surface. Loose bodies are  present within the inferomedial left hip joint. No acute displaced  fracture. No CT finding for classic femoral head osteonecrosis.    Right total hip arthroplasty in place without acute displaced  periprosthetic fracture. Degenerative change lower lumbar spine with  multilevel facet arthropathy and degenerative disc disease.  Degenerative change both sacroiliac joints with vacuum joint  phenomenon. Mild arthritis at the symphysis pubis. No concerning bone  lesion.    Atherosclerotic vascular disease. No evidence for an intestinal  obstruction. Absent uterus. Fat containing  left inguinal hernia. No  sizable hip joint effusion. No significant free intrapelvic fluid.      Impression    IMPRESSION:  1.  Severe end-stage left hip osteoarthritis.   2.  No evidence for occult lower lumbar spine, sacral, pelvic, or  proximal femur fracture. Specifically, no left hip fracture or  dislocation. MRI more sensitive to detect nondisplaced fractures.  3.  Right total hip arthroplasty.  4.  Arterial calcification.     SHAISTA CARLOS MD         SYSTEM ID:  [id number]   US Lower Extremity Venous Duplex Bilateral    Narrative    EXAM: US LOWER EXTREMITY VENOUS DUPLEX BILATERAL  LOCATION: [facility]  DATE/TIME: 1/11/2022 4:01 PM    INDICATION: Bilateral lower extremity pain, swelling and edema.  COMPARISON: 9/2/2021.  TECHNIQUE: Venous Duplex ultrasound of bilateral lower extremities with and without compression, augmentation and duplex. Color flow and spectral Doppler with waveform analysis performed.    FINDINGS: Exam includes the common femoral, femoral, popliteal veins as well as segmentally visualized deep calf veins and greater saphenous vein.     RIGHT: No deep vein thrombosis. No superficial thrombophlebitis. Within the right popliteal fossa is an anechoic avascular fluid collection measuring approximately 3.3 x 1.6 x 0.7 cm.    LEFT: No deep vein thrombosis. No superficial thrombophlebitis. No popliteal cyst.      Impression    IMPRESSION:  1.  No deep venous thrombosis in the bilateral lower extremities.  2.  Stable right popliteal fossa Baker's cyst.   XR Chest Port 1 View    Narrative    CHEST ONE VIEW January 14, 2022 8:50 AM     HISTORY: Shortness of breath.    COMPARISON: January 10, 2022.      Impression    IMPRESSION: Question minimal ground-glass infiltrates at both lung  bases laterally.    LIZA HOWARD MD         SYSTEM ID:  [id number]   CT Chest Pulmonary Embolism w Contrast    Narrative    CT CHEST PULMONARY EMBOLISM WITH CONTRAST 1/14/2022 12:03 PM    CLINICAL  HISTORY: Chest Pain. Pulmonary embolus suspected, high  probability.     TECHNIQUE: CT angiogram chest during arterial phase injection IV  contrast. 2D and 3D MIP reconstructions were performed by the CT  technologist. Dose reduction techniques were used.   CONTRAST: 65mL [id number]-370    COMPARISON: None.    FINDINGS:  ANGIOGRAM CHEST: Pulmonary arteries are normal caliber and negative  for pulmonary emboli. Thoracic aorta is negative for dissection. No CT  evidence of right heart strain.    LUNGS AND PLEURA: Trace pleural fluid bilaterally. Minimal associated  probable compressive atelectasis versus less likely infiltrate.    MEDIASTINUM/AXILLAE: No adenopathy or aneurysm. There are extensive  coronary vascular calcifications and/or stents consistent with  coronary artery disease.    UPPER ABDOMEN: Some perinephric stranding on the right is seen which  is nonspecific. Bilateral adrenal adenomas.    MUSCULOSKELETAL: No frankly destructive bony lesions.      Impression    IMPRESSION:  1.  No pulmonary embolism demonstrated.  2.  Minimal stranding in the posterior pararenal space seen in the  upper right abdomen, of uncertain etiology.     LIZA HOWARD MD         SYSTEM ID:  [id number]       Recent Labs   Lab 01/14/22 2032 01/14/22  1115 01/13/22  0856   WBC 14.5* 12.8* 5.9   HGB 6.6* 8.2* 11.0*   HCT 21.4* 25.7* 35.8   MCV 91 89 91    267 207     No results for input(s): CULT in the last 168 hours.  Recent Labs   Lab 01/14/22  2246 01/14/22 2039 01/14/22  2025 01/14/22  0839 01/14/22  0825 01/13/22  1346 01/13/22  0856 01/11/22  1808 01/11/22  1357   NA  --  134  --   --  134  --  138   < > 139   POTASSIUM  --  5.1  --   --  4.1  --  4.7   < > 4.3  4.2   CHLORIDE  --  101  --   --  101  --  107   < > 105   CO2  --  15*  --   --  22  --  28   < > 27   ANIONGAP  --  18*  --   --  11  --  3   < > 7   * 485* 478*   < > 469*   < > 299*   < > 247*   BUN  --  43*  --   --  35*  --  22   < > 30   CR  --  1.58*   --   --  0.83  --  0.63   < > 1.14*   GFRESTIMATED  --  33*  --   --  71  --  90   < > 49*   NATACHA  --  8.0*  --   --  8.9  --  8.7   < > 8.5   PROTTOTAL  --   --   --   --   --   --   --   --  6.0*   ALBUMIN  --   --   --   --   --   --   --   --  2.6*   BILITOTAL  --   --   --   --   --   --   --   --  0.4   ALKPHOS  --   --   --   --   --   --   --   --  110   AST  --   --   --   --   --   --   --   --  75*   ALT  --   --   --   --   --   --   --   --  77*    < > = values in this interval not displayed.       Recent Labs   Lab 01/14/22  2246 01/14/22 2039 01/14/22 2025 01/14/22  1846 01/14/22  1417   * 485* 478* 367* 288*       Recent Labs   Lab 01/14/22  1115 01/13/22  0856 01/12/22  0751   INR 3.15* 1.60* 1.37*             Pending Results:       Unresulted Labs Ordered in the Past 30 Days of this Admission     Date and Time Order Name Status Description    1/14/2022 10:30 PM Prepare red blood cells (unit) Preliminary              Patient Allergies:       Allergies   Allergen Reactions     Oxycodone-Acetaminophen Rash     Disclaimer: This note consists of symbols derived from keyboarding, dictation and/or voice recognition software. As a result, there may be errors in the script that have gone undetected. Please consider this when interpreting information found in this chart